# Patient Record
Sex: MALE | Race: WHITE | NOT HISPANIC OR LATINO | URBAN - METROPOLITAN AREA
[De-identification: names, ages, dates, MRNs, and addresses within clinical notes are randomized per-mention and may not be internally consistent; named-entity substitution may affect disease eponyms.]

---

## 2022-05-27 ENCOUNTER — INPATIENT (INPATIENT)
Facility: HOSPITAL | Age: 87
LOS: 5 days | Discharge: HOME | DRG: 177 | End: 2022-06-02
Attending: STUDENT IN AN ORGANIZED HEALTH CARE EDUCATION/TRAINING PROGRAM | Admitting: STUDENT IN AN ORGANIZED HEALTH CARE EDUCATION/TRAINING PROGRAM
Payer: MEDICARE

## 2022-05-27 VITALS
OXYGEN SATURATION: 96 % | HEART RATE: 76 BPM | SYSTOLIC BLOOD PRESSURE: 153 MMHG | RESPIRATION RATE: 16 BRPM | TEMPERATURE: 98 F | DIASTOLIC BLOOD PRESSURE: 88 MMHG

## 2022-05-27 LAB
ALBUMIN SERPL ELPH-MCNC: 2.7 G/DL — LOW (ref 3.3–5.2)
ALP SERPL-CCNC: 74 U/L — SIGNIFICANT CHANGE UP (ref 40–120)
ALT FLD-CCNC: 8 U/L — SIGNIFICANT CHANGE UP
ANION GAP SERPL CALC-SCNC: 12 MMOL/L — SIGNIFICANT CHANGE UP (ref 5–17)
AST SERPL-CCNC: 14 U/L — SIGNIFICANT CHANGE UP
BASOPHILS # BLD AUTO: 0.01 K/UL — SIGNIFICANT CHANGE UP (ref 0–0.2)
BASOPHILS NFR BLD AUTO: 0.1 % — SIGNIFICANT CHANGE UP (ref 0–2)
BILIRUB SERPL-MCNC: 0.6 MG/DL — SIGNIFICANT CHANGE UP (ref 0.4–2)
BUN SERPL-MCNC: 18.6 MG/DL — SIGNIFICANT CHANGE UP (ref 8–20)
CALCIUM SERPL-MCNC: 7.9 MG/DL — LOW (ref 8.6–10.2)
CHLORIDE SERPL-SCNC: 109 MMOL/L — HIGH (ref 98–107)
CK SERPL-CCNC: 56 U/L — SIGNIFICANT CHANGE UP (ref 30–200)
CO2 SERPL-SCNC: 22 MMOL/L — SIGNIFICANT CHANGE UP (ref 22–29)
CREAT SERPL-MCNC: 1 MG/DL — SIGNIFICANT CHANGE UP (ref 0.5–1.3)
EGFR: 72 ML/MIN/1.73M2 — SIGNIFICANT CHANGE UP
EOSINOPHIL # BLD AUTO: 0.01 K/UL — SIGNIFICANT CHANGE UP (ref 0–0.5)
EOSINOPHIL NFR BLD AUTO: 0.1 % — SIGNIFICANT CHANGE UP (ref 0–6)
GLUCOSE SERPL-MCNC: 91 MG/DL — SIGNIFICANT CHANGE UP (ref 70–99)
HCT VFR BLD CALC: 42.8 % — SIGNIFICANT CHANGE UP (ref 39–50)
HGB BLD-MCNC: 13.9 G/DL — SIGNIFICANT CHANGE UP (ref 13–17)
IMM GRANULOCYTES NFR BLD AUTO: 0.4 % — SIGNIFICANT CHANGE UP (ref 0–1.5)
LYMPHOCYTES # BLD AUTO: 0.86 K/UL — LOW (ref 1–3.3)
LYMPHOCYTES # BLD AUTO: 11.3 % — LOW (ref 13–44)
MCHC RBC-ENTMCNC: 31.4 PG — SIGNIFICANT CHANGE UP (ref 27–34)
MCHC RBC-ENTMCNC: 32.5 GM/DL — SIGNIFICANT CHANGE UP (ref 32–36)
MCV RBC AUTO: 96.6 FL — SIGNIFICANT CHANGE UP (ref 80–100)
MONOCYTES # BLD AUTO: 0.92 K/UL — HIGH (ref 0–0.9)
MONOCYTES NFR BLD AUTO: 12.1 % — SIGNIFICANT CHANGE UP (ref 2–14)
NEUTROPHILS # BLD AUTO: 5.79 K/UL — SIGNIFICANT CHANGE UP (ref 1.8–7.4)
NEUTROPHILS NFR BLD AUTO: 76 % — SIGNIFICANT CHANGE UP (ref 43–77)
NT-PROBNP SERPL-SCNC: 355 PG/ML — HIGH (ref 0–300)
PLATELET # BLD AUTO: 114 K/UL — LOW (ref 150–400)
POTASSIUM SERPL-MCNC: 4.2 MMOL/L — SIGNIFICANT CHANGE UP (ref 3.5–5.3)
POTASSIUM SERPL-SCNC: 4.2 MMOL/L — SIGNIFICANT CHANGE UP (ref 3.5–5.3)
PROT SERPL-MCNC: 5.6 G/DL — LOW (ref 6.6–8.7)
RBC # BLD: 4.43 M/UL — SIGNIFICANT CHANGE UP (ref 4.2–5.8)
RBC # FLD: 14.7 % — HIGH (ref 10.3–14.5)
SODIUM SERPL-SCNC: 143 MMOL/L — SIGNIFICANT CHANGE UP (ref 135–145)
TROPONIN T SERPL-MCNC: <0.01 NG/ML — SIGNIFICANT CHANGE UP (ref 0–0.06)
WBC # BLD: 7.62 K/UL — SIGNIFICANT CHANGE UP (ref 3.8–10.5)
WBC # FLD AUTO: 7.62 K/UL — SIGNIFICANT CHANGE UP (ref 3.8–10.5)

## 2022-05-27 PROCEDURE — 99285 EMERGENCY DEPT VISIT HI MDM: CPT | Mod: CS,GC

## 2022-05-27 PROCEDURE — 71045 X-RAY EXAM CHEST 1 VIEW: CPT | Mod: 26

## 2022-05-27 RX ORDER — SODIUM CHLORIDE 9 MG/ML
1000 INJECTION INTRAMUSCULAR; INTRAVENOUS; SUBCUTANEOUS ONCE
Refills: 0 | Status: COMPLETED | OUTPATIENT
Start: 2022-05-27 | End: 2022-05-27

## 2022-05-27 RX ADMIN — SODIUM CHLORIDE 1000 MILLILITER(S): 9 INJECTION INTRAMUSCULAR; INTRAVENOUS; SUBCUTANEOUS at 20:46

## 2022-05-27 NOTE — ED PROVIDER NOTE - ATTENDING CONTRIBUTION TO CARE
89 yo M transferred from assisted living residence for eval of weakness, diarrhea, AMS and decreased po intake.  Pt was noted to be COVID+ today.  Pt also noted to have new RBBB on EKG.  No reported fever.  On exam awake and alert, oriented x 2, PERRL, + dry mm, Neck supple, Cor Reg, Lungs poor insp effort, otherwise clear b/l, Abd soft, NT, Ext FROM, Neuro no focal deficits.  will check labs, EKG, CXR and re-eval after IVF

## 2022-05-27 NOTE — ED PROVIDER NOTE - CARE PLAN
Principal Discharge DX:	Toxic metabolic encephalopathy  Secondary Diagnosis:	Urinary tract infection   1

## 2022-05-27 NOTE — ED ADULT NURSE NOTE - OBJECTIVE STATEMENT
pt A&Ox1 states he does not know where he is right now or the reason for being in the hospital.. pt poor historian due to dementia hx. pt update on plan of care and place on cm currently BBB and spo2 WNL on RA.

## 2022-05-27 NOTE — ED PROVIDER NOTE - CLINICAL SUMMARY MEDICAL DECISION MAKING FREE TEXT BOX
Patient is 89 yo male with PMHx HTN, HLD, MDD, Insomnia, dementia, prostate CA presenting from the [facility] [place] presenting with lethargy, diarrhea, and recent covid diagnosis. As per nurse at [facility] ([phone]) patient has had decreased PO intolerance, nonbloody diarrhea, fatigue, lethargy, and aggressive behavior for 2 days which is highly unusual for the patient. Patient was also found to have a new RBBB on ecg this AM and found to be COVID-19 positive; patient is vaccinated. Arrives VSS, aaox2, mildly confused, at baseline complaining of weakness, no current CP or SOB, lungs CTAB, belly soft nontender, dry oropharynx, no peripheral edema, no FND, moving all extremities equally, no ataxia. cbc cmp cpk to r/o electrolyte abnormalities; ecg troponin to r/o ACS. RVP UA BNP CXR to r/o UTI vs. URI vs. HF vs. PNA. Will hydrate and continue to monitor.

## 2022-05-27 NOTE — ED ADULT TRIAGE NOTE - CHIEF COMPLAINT QUOTE
patient from [facility], with complaints of diarrhea, patient was sent for being dehydrated. patient has a history of dimentia unknown baseline, patient denies any complaints of pain or discomfort,  patient COVID pos

## 2022-05-27 NOTE — ED ADULT NURSE NOTE - CHIEF COMPLAINT QUOTE
patient from [facility], with complaints of diarrhea, patient was sent for being dehydrated. patient has a history of dimentia unknown baseline, patient denies any complaints of pain or discomfort,  patient COVID pos

## 2022-05-27 NOTE — ED PROVIDER NOTE - OBJECTIVE STATEMENT
Patient is 87 yo male with PMHx HTN, HLD, MDD, Insomnia, dementia, prostate CA presenting from the [facility] at [place] presenting with lethargy, diarrhea, and recent covid diagnosis. As per nurse at [facility] ([phone]) patient has had decreased PO intolerance, nonbloody diarrhea, fatigue, lethargy, and aggressive behavior for 2 days which is highly unusual for the patient. Patient was also found to have a new RBBB on ecg this AM and found to be COVID-19 positive; patient is vaccinated. Arrives VSS, aaox2, mildly confused, at baseline complaining of weakness, no current CP or SOB. Patient does not have any cardiac history, FH of cardiac disease, prior smoking. Denies fevers, chills, dizziness, lightheadedness, dysphagia, dysarthria, diplopia, photophobia, syncope, cough, congestion, SOB, CP, abdominal pain, neck pain, back pain, dysuria, hematuria, hematochezia, hematemesis, n/v, recent travel, sick contacts, leg swelling.

## 2022-05-27 NOTE — ED PROVIDER NOTE - PROGRESS NOTE DETAILS
JK - patient cbc cmp ecg CXR troponin WNL. Found to be covid-19 positive with UA c/f UTI; IV abx given. Patient aaox2, still mildly confused, VSS, resting comfortably, in no apparent distress. Ready and agreeable to admission for IV abx and further monitoring.

## 2022-05-27 NOTE — ED PROVIDER NOTE - PHYSICAL EXAMINATION
Constitutional: Uncomfortable appearing, awake, alert, oriented to person, place, not time/place at baseline mental status, mildly confused, and in no apparent distress  ENMT: Airway patent nasal mucosa clear. Mouth with dry  mucosa. Throat has no vesicles no oropharyngeal exudates and uvula is midline. No blood in the oropharynx  EYES: clear bilaterally, pupils equal, round and reactive to light  Cardiac: Regular rate, regular rhythm. Heart sounds S1, S2. No murmurs, rubs or gallops. Good capillary refill, 2+ pulses, no peripheral edema  Respiratory: Lungs CTAB, no use of accessory muscles, no crackles, satting 96% on RA in no distress  Gastrointestinal: Abdomen nondistended, non-tender, no rebound guarding or peritoneal signs  Genitourinary: No CVA tenderness, pelvis nontender, bladder nondistended  Musculoskeletal: Spine appears normal, range of motion is not limited, no muscle or joint tenderness  Neurological: Alert and oriented, no focal deficits, no motor or sensory deficits. CN 2-12 intact, PERRLA, EOMI, No FND, moving all extremities equally, sensation intact, No dysmetria, no ataxia, negative heel-shin, 5/5 strength   Skin: Skin normal color for race, warm, dry and intact, No evidence of rash

## 2022-05-28 DIAGNOSIS — G92.8 OTHER TOXIC ENCEPHALOPATHY: ICD-10-CM

## 2022-05-28 LAB
APPEARANCE UR: ABNORMAL
BACTERIA # UR AUTO: ABNORMAL
BILIRUB UR-MCNC: NEGATIVE — SIGNIFICANT CHANGE UP
COLOR SPEC: YELLOW — SIGNIFICANT CHANGE UP
DIFF PNL FLD: ABNORMAL
EPI CELLS # UR: SIGNIFICANT CHANGE UP
GLUCOSE UR QL: NEGATIVE — SIGNIFICANT CHANGE UP
KETONES UR-MCNC: ABNORMAL
LEUKOCYTE ESTERASE UR-ACNC: ABNORMAL
NITRITE UR-MCNC: POSITIVE
PH UR: 7 — SIGNIFICANT CHANGE UP (ref 5–8)
PROT UR-MCNC: NEGATIVE — SIGNIFICANT CHANGE UP
RAPID RVP RESULT: DETECTED
RBC CASTS # UR COMP ASSIST: ABNORMAL /HPF (ref 0–4)
SARS-COV-2 RNA SPEC QL NAA+PROBE: DETECTED
SP GR SPEC: 1.01 — SIGNIFICANT CHANGE UP (ref 1.01–1.02)
UROBILINOGEN FLD QL: NEGATIVE — SIGNIFICANT CHANGE UP
WBC UR QL: ABNORMAL /HPF (ref 0–5)

## 2022-05-28 PROCEDURE — 99223 1ST HOSP IP/OBS HIGH 75: CPT

## 2022-05-28 PROCEDURE — 12345: CPT | Mod: NC

## 2022-05-28 PROCEDURE — 93010 ELECTROCARDIOGRAM REPORT: CPT

## 2022-05-28 RX ORDER — CEFTRIAXONE 500 MG/1
1000 INJECTION, POWDER, FOR SOLUTION INTRAMUSCULAR; INTRAVENOUS ONCE
Refills: 0 | Status: COMPLETED | OUTPATIENT
Start: 2022-05-28 | End: 2022-05-28

## 2022-05-28 RX ORDER — TRAZODONE HCL 50 MG
1 TABLET ORAL
Qty: 0 | Refills: 0 | DISCHARGE

## 2022-05-28 RX ORDER — VENLAFAXINE HCL 75 MG
75 CAPSULE, EXT RELEASE 24 HR ORAL DAILY
Refills: 0 | Status: DISCONTINUED | OUTPATIENT
Start: 2022-05-28 | End: 2022-06-02

## 2022-05-28 RX ORDER — DILTIAZEM HCL 120 MG
60 CAPSULE, EXT RELEASE 24 HR ORAL
Refills: 0 | Status: DISCONTINUED | OUTPATIENT
Start: 2022-05-28 | End: 2022-06-02

## 2022-05-28 RX ORDER — METOPROLOL TARTRATE 50 MG
1 TABLET ORAL
Qty: 0 | Refills: 0 | DISCHARGE

## 2022-05-28 RX ORDER — METOPROLOL TARTRATE 50 MG
50 TABLET ORAL DAILY
Refills: 0 | Status: DISCONTINUED | OUTPATIENT
Start: 2022-05-28 | End: 2022-06-02

## 2022-05-28 RX ORDER — ONDANSETRON 8 MG/1
4 TABLET, FILM COATED ORAL EVERY 8 HOURS
Refills: 0 | Status: DISCONTINUED | OUTPATIENT
Start: 2022-05-28 | End: 2022-06-02

## 2022-05-28 RX ORDER — HEPARIN SODIUM 5000 [USP'U]/ML
5000 INJECTION INTRAVENOUS; SUBCUTANEOUS EVERY 12 HOURS
Refills: 0 | Status: DISCONTINUED | OUTPATIENT
Start: 2022-05-28 | End: 2022-05-31

## 2022-05-28 RX ORDER — DONEPEZIL HYDROCHLORIDE 10 MG/1
10 TABLET, FILM COATED ORAL AT BEDTIME
Refills: 0 | Status: DISCONTINUED | OUTPATIENT
Start: 2022-05-28 | End: 2022-06-02

## 2022-05-28 RX ORDER — LANOLIN ALCOHOL/MO/W.PET/CERES
3 CREAM (GRAM) TOPICAL AT BEDTIME
Refills: 0 | Status: DISCONTINUED | OUTPATIENT
Start: 2022-05-28 | End: 2022-06-02

## 2022-05-28 RX ORDER — MIRTAZAPINE 45 MG/1
7.5 TABLET, ORALLY DISINTEGRATING ORAL AT BEDTIME
Refills: 0 | Status: DISCONTINUED | OUTPATIENT
Start: 2022-05-28 | End: 2022-06-02

## 2022-05-28 RX ORDER — SODIUM CHLORIDE 9 MG/ML
3 INJECTION INTRAMUSCULAR; INTRAVENOUS; SUBCUTANEOUS EVERY 8 HOURS
Refills: 0 | Status: DISCONTINUED | OUTPATIENT
Start: 2022-05-28 | End: 2022-06-02

## 2022-05-28 RX ORDER — CEFTRIAXONE 500 MG/1
2000 INJECTION, POWDER, FOR SOLUTION INTRAMUSCULAR; INTRAVENOUS EVERY 24 HOURS
Refills: 0 | Status: DISCONTINUED | OUTPATIENT
Start: 2022-05-28 | End: 2022-06-02

## 2022-05-28 RX ORDER — ATORVASTATIN CALCIUM 80 MG/1
20 TABLET, FILM COATED ORAL AT BEDTIME
Refills: 0 | Status: DISCONTINUED | OUTPATIENT
Start: 2022-05-28 | End: 2022-06-02

## 2022-05-28 RX ORDER — TRAZODONE HCL 50 MG
100 TABLET ORAL AT BEDTIME
Refills: 0 | Status: DISCONTINUED | OUTPATIENT
Start: 2022-05-28 | End: 2022-06-02

## 2022-05-28 RX ORDER — ACETAMINOPHEN 500 MG
650 TABLET ORAL EVERY 6 HOURS
Refills: 0 | Status: DISCONTINUED | OUTPATIENT
Start: 2022-05-28 | End: 2022-06-02

## 2022-05-28 RX ADMIN — SODIUM CHLORIDE 3 MILLILITER(S): 9 INJECTION INTRAMUSCULAR; INTRAVENOUS; SUBCUTANEOUS at 22:48

## 2022-05-28 RX ADMIN — SODIUM CHLORIDE 3 MILLILITER(S): 9 INJECTION INTRAMUSCULAR; INTRAVENOUS; SUBCUTANEOUS at 14:47

## 2022-05-28 RX ADMIN — DONEPEZIL HYDROCHLORIDE 10 MILLIGRAM(S): 10 TABLET, FILM COATED ORAL at 22:47

## 2022-05-28 RX ADMIN — HEPARIN SODIUM 5000 UNIT(S): 5000 INJECTION INTRAVENOUS; SUBCUTANEOUS at 06:37

## 2022-05-28 RX ADMIN — ATORVASTATIN CALCIUM 20 MILLIGRAM(S): 80 TABLET, FILM COATED ORAL at 22:47

## 2022-05-28 RX ADMIN — Medication 100 MILLIGRAM(S): at 22:46

## 2022-05-28 RX ADMIN — CEFTRIAXONE 100 MILLIGRAM(S): 500 INJECTION, POWDER, FOR SOLUTION INTRAMUSCULAR; INTRAVENOUS at 02:37

## 2022-05-28 RX ADMIN — Medication 75 MILLIGRAM(S): at 14:31

## 2022-05-28 RX ADMIN — Medication 5 MILLIGRAM(S): at 14:31

## 2022-05-28 RX ADMIN — SODIUM CHLORIDE 3 MILLILITER(S): 9 INJECTION INTRAMUSCULAR; INTRAVENOUS; SUBCUTANEOUS at 06:24

## 2022-05-28 RX ADMIN — Medication 60 MILLIGRAM(S): at 18:32

## 2022-05-28 RX ADMIN — MIRTAZAPINE 7.5 MILLIGRAM(S): 45 TABLET, ORALLY DISINTEGRATING ORAL at 22:47

## 2022-05-28 RX ADMIN — Medication 50 MILLIGRAM(S): at 14:31

## 2022-05-28 RX ADMIN — HEPARIN SODIUM 5000 UNIT(S): 5000 INJECTION INTRAVENOUS; SUBCUTANEOUS at 18:32

## 2022-05-28 NOTE — H&P ADULT - NSHPSOCIALHISTORY_GEN_ALL_CORE
Unable to obtain surgical history, or family history at this time due to dementia, no family available

## 2022-05-28 NOTE — PATIENT PROFILE ADULT - FALL HARM RISK - HARM RISK INTERVENTIONS
Reason for Disposition   Nursing judgment or information in reference    Protocols used: NO GUIDELINE AVAILABLE-ADULT-AH    [facility], with red flag complaint, transferred to RN access for triage. Call not triaged. Pt not with daughter. Pt lives in an assisted living facility (non skilled nursing). His aid called the daughter and stated that for the last 2-3 days, pt has been leaning when ambulating. Fast stroke test revealed no lateral weakness. Pt has no c/o pain. Aid also states she noticed some drooling from pt. Daughter states that pt's code status is [facility] so she did not want him transported to the ED. He has a regular appointment scheduled in April, but the daughter would like for him to be seen earlier. Writer recommended for him to be seen at the first available appointment. Patient's daughter verbalized understanding. She denies any other questions or concerns. Writer provided warm transfer to Yeimi Topete for appointment scheduling. Please do not respond to the triage nurse through this encounter. Any subsequent communication should be directly with the patient.
Assistance with ambulation/Assistance OOB with selected safe patient handling equipment/Communicate Risk of Fall with Harm to all staff/Discuss with provider need for PT consult/Monitor gait and stability/Provide patient with walking aids - walker, cane, crutches/Reinforce activity limits and safety measures with patient and family/Tailored Fall Risk Interventions/Visual Cue: Yellow wristband and red socks/Bed in lowest position, wheels locked, appropriate side rails in place/Call bell, personal items and telephone in reach/Instruct patient to call for assistance before getting out of bed or chair/Non-slip footwear when patient is out of bed/[place] to call system/Physically safe environment - no spills, clutter or unnecessary equipment/Purposeful Proactive Rounding/Room/bathroom lighting operational, light cord in reach

## 2022-05-28 NOTE — H&P ADULT - ASSESSMENT
89 y/o male with UTI, COVID, Metabolic encephalopathy, Hx of MDD, Dementia, HLD, HTN, prostate cancer     UTI:  -Cont. Rocephin pending cultures  -Check PVR to r/o urinary retention/stasis as cause of UTI  -No flank pain, fever, leukocytosis, low suspicion for septic stone/pyelonephritis   -Trend WBC/Temp curve  -OOB, ambulate, PT program  -Heparin Sub Q for DVT-P    COVID:  -No hypoxia  -No fever  -No CXR w/o infiltrates  -Isolation  -mAb as o/p?  -Supportive care    Dementia:  -Fall risk  -Await med list from [facility]    HLD:  -Cardiac diet  -await med list from [facility]     HTN:  -DASH diet  -Await med list from [facility]    Prostate cancer:  -Await records from [facility]    Discussed with ED staff

## 2022-05-28 NOTE — H&P ADULT - RESPIRATORY
Faustino from [facility] [phone] called. Patient appointment has been rescheduled to 10/30/20  
This PSR called [facility] in [place] to r/s appt. Left detailed message for BEVERLEY Stanton to call back to r/s appt to 10/30.  
detailed exam

## 2022-05-28 NOTE — H&P ADULT - CRANIAL NERVE
[FreeTextEntry1] : Patient doing well.  Discussed option of outpatient PT for balance for community ambulation.  Pt. declines at this time but will gradually increase community function and will call for Rx if develops challenges and wants to pursue outpatient therapy.  \par \par No neurological or cognitive impairment that I see that would affect driving.  Pt. referred for behind the wheel driving evaluation.  \par \par f/u PRN
no asymmetry

## 2022-05-28 NOTE — ED ADULT NURSE REASSESSMENT NOTE - NS ED NURSE REASSESS COMMENT FT1
Pt found sleeping.  Easily arousable.  Denies pain.  NS on cardiac monitor.
all other ROS negative except as per HPI

## 2022-05-28 NOTE — H&P ADULT - NSICDXPASTMEDICALHX_GEN_ALL_CORE_FT
PAST MEDICAL HISTORY:  Dementia     HLD (hyperlipidemia)     HTN (hypertension)     Prostate cancer

## 2022-05-28 NOTE — PROGRESS NOTE ADULT - ASSESSMENT
87 y/o male with Hx of MDD, Dementia, HLD, HTN, prostate cancer presents with reported Dementia sent from [place] for increased confusion, diarrhea, and positive covid test. Admitted with UTI, COVID, Metabolic encephalopathy.     UTI  c/w Rocephin   Await UCx   Bladder scan post void   No flank pain, fever, leukocytosis  OOB, ambulate     COVID 19   No symptoms   CXR w/o infiltrates  c/w Isolation  c/w Supportive care    Dementia  Fall risk  restart Donepezil, Mirtazapine, Trazodone, Venlafaxine     HLD  HTN  c/w Metoprolol and Cardizem   DASH diet    Prostate cancer  Overactive bladder   takes Gemtesa at home     DVT ppx: Heparin SQ

## 2022-05-28 NOTE — H&P ADULT - HISTORY OF PRESENT ILLNESS
87 y/o male with reported Dementia sent from [place] for increased confusion  89 y/o male with reported Dementia sent from [place] for increased confusion, diarrhea, and positive covid test. Patient unable to provide any meaningful history, no paperwork or phone numbers available to speak to family for collateral information. Per chart review patient has a history of dementia HLD, prostate cancer, and HTN. ED w/u with stable vitals, non-focal exam. Found to be COVID pos, no hypoxia, or infiltrates on CXR. U/A grossly positive. No CVA tenderness noted. Patient cultured, given IV abx and IVF. Spoke with Olivia Chowdary on call at [place] who didnt have meds or other medical info but is going to have it called over/fax information to [facility] today. Patient at this time offers no c/o pain, CP, SOB, HA, fever or chills.

## 2022-05-29 LAB
ANION GAP SERPL CALC-SCNC: 16 MMOL/L — SIGNIFICANT CHANGE UP (ref 5–17)
BASOPHILS # BLD AUTO: 0.01 K/UL — SIGNIFICANT CHANGE UP (ref 0–0.2)
BASOPHILS NFR BLD AUTO: 0.2 % — SIGNIFICANT CHANGE UP (ref 0–2)
BUN SERPL-MCNC: 18.8 MG/DL — SIGNIFICANT CHANGE UP (ref 8–20)
CALCIUM SERPL-MCNC: 8 MG/DL — LOW (ref 8.6–10.2)
CHLORIDE SERPL-SCNC: 106 MMOL/L — SIGNIFICANT CHANGE UP (ref 98–107)
CO2 SERPL-SCNC: 21 MMOL/L — LOW (ref 22–29)
CREAT SERPL-MCNC: 0.98 MG/DL — SIGNIFICANT CHANGE UP (ref 0.5–1.3)
EGFR: 74 ML/MIN/1.73M2 — SIGNIFICANT CHANGE UP
EOSINOPHIL # BLD AUTO: 0.07 K/UL — SIGNIFICANT CHANGE UP (ref 0–0.5)
EOSINOPHIL NFR BLD AUTO: 1.2 % — SIGNIFICANT CHANGE UP (ref 0–6)
GLUCOSE SERPL-MCNC: 82 MG/DL — SIGNIFICANT CHANGE UP (ref 70–99)
HCT VFR BLD CALC: 45 % — SIGNIFICANT CHANGE UP (ref 39–50)
HGB BLD-MCNC: 14.6 G/DL — SIGNIFICANT CHANGE UP (ref 13–17)
IMM GRANULOCYTES NFR BLD AUTO: 0.5 % — SIGNIFICANT CHANGE UP (ref 0–1.5)
LYMPHOCYTES # BLD AUTO: 1.01 K/UL — SIGNIFICANT CHANGE UP (ref 1–3.3)
LYMPHOCYTES # BLD AUTO: 17.7 % — SIGNIFICANT CHANGE UP (ref 13–44)
MCHC RBC-ENTMCNC: 31.4 PG — SIGNIFICANT CHANGE UP (ref 27–34)
MCHC RBC-ENTMCNC: 32.4 GM/DL — SIGNIFICANT CHANGE UP (ref 32–36)
MCV RBC AUTO: 96.8 FL — SIGNIFICANT CHANGE UP (ref 80–100)
MONOCYTES # BLD AUTO: 0.61 K/UL — SIGNIFICANT CHANGE UP (ref 0–0.9)
MONOCYTES NFR BLD AUTO: 10.7 % — SIGNIFICANT CHANGE UP (ref 2–14)
NEUTROPHILS # BLD AUTO: 3.97 K/UL — SIGNIFICANT CHANGE UP (ref 1.8–7.4)
NEUTROPHILS NFR BLD AUTO: 69.7 % — SIGNIFICANT CHANGE UP (ref 43–77)
PLATELET # BLD AUTO: 150 K/UL — SIGNIFICANT CHANGE UP (ref 150–400)
POTASSIUM SERPL-MCNC: 3.9 MMOL/L — SIGNIFICANT CHANGE UP (ref 3.5–5.3)
POTASSIUM SERPL-SCNC: 3.9 MMOL/L — SIGNIFICANT CHANGE UP (ref 3.5–5.3)
RBC # BLD: 4.65 M/UL — SIGNIFICANT CHANGE UP (ref 4.2–5.8)
RBC # FLD: 14.9 % — HIGH (ref 10.3–14.5)
SODIUM SERPL-SCNC: 143 MMOL/L — SIGNIFICANT CHANGE UP (ref 135–145)
WBC # BLD: 5.7 K/UL — SIGNIFICANT CHANGE UP (ref 3.8–10.5)
WBC # FLD AUTO: 5.7 K/UL — SIGNIFICANT CHANGE UP (ref 3.8–10.5)

## 2022-05-29 PROCEDURE — 99233 SBSQ HOSP IP/OBS HIGH 50: CPT

## 2022-05-29 RX ADMIN — Medication 50 MILLIGRAM(S): at 05:57

## 2022-05-29 RX ADMIN — HEPARIN SODIUM 5000 UNIT(S): 5000 INJECTION INTRAVENOUS; SUBCUTANEOUS at 05:56

## 2022-05-29 RX ADMIN — CEFTRIAXONE 100 MILLIGRAM(S): 500 INJECTION, POWDER, FOR SOLUTION INTRAMUSCULAR; INTRAVENOUS at 03:10

## 2022-05-29 RX ADMIN — Medication 60 MILLIGRAM(S): at 12:00

## 2022-05-29 RX ADMIN — SODIUM CHLORIDE 3 MILLILITER(S): 9 INJECTION INTRAMUSCULAR; INTRAVENOUS; SUBCUTANEOUS at 05:57

## 2022-05-29 RX ADMIN — SODIUM CHLORIDE 3 MILLILITER(S): 9 INJECTION INTRAMUSCULAR; INTRAVENOUS; SUBCUTANEOUS at 14:03

## 2022-05-29 RX ADMIN — SODIUM CHLORIDE 3 MILLILITER(S): 9 INJECTION INTRAMUSCULAR; INTRAVENOUS; SUBCUTANEOUS at 22:49

## 2022-05-29 RX ADMIN — DONEPEZIL HYDROCHLORIDE 10 MILLIGRAM(S): 10 TABLET, FILM COATED ORAL at 23:02

## 2022-05-29 RX ADMIN — Medication 100 MILLIGRAM(S): at 22:56

## 2022-05-29 RX ADMIN — HEPARIN SODIUM 5000 UNIT(S): 5000 INJECTION INTRAVENOUS; SUBCUTANEOUS at 18:07

## 2022-05-29 RX ADMIN — MIRTAZAPINE 7.5 MILLIGRAM(S): 45 TABLET, ORALLY DISINTEGRATING ORAL at 22:56

## 2022-05-29 RX ADMIN — Medication 60 MILLIGRAM(S): at 23:02

## 2022-05-29 RX ADMIN — Medication 60 MILLIGRAM(S): at 01:11

## 2022-05-29 RX ADMIN — Medication 5 MILLIGRAM(S): at 05:56

## 2022-05-29 RX ADMIN — Medication 60 MILLIGRAM(S): at 05:56

## 2022-05-29 RX ADMIN — ATORVASTATIN CALCIUM 20 MILLIGRAM(S): 80 TABLET, FILM COATED ORAL at 22:57

## 2022-05-29 RX ADMIN — Medication 60 MILLIGRAM(S): at 18:06

## 2022-05-29 NOTE — PROGRESS NOTE ADULT - ASSESSMENT
89 y/o male with Hx of MDD, Dementia, HLD, HTN, prostate cancer presents with reported Dementia sent from [place] for increased confusion, diarrhea, and positive covid test. Admitted with UTI, COVID, Metabolic encephalopathy.     #UTI  -c/w Rocephin   -Await UCx   -Bladder scan post void   -No flank pain, fever, leukocytosis  -OOB, ambulate     #COVID 19  - s/p vaccination and booster, diagnosed 5/27  -on RA  -CXR w/o infiltrates  -c/w Isolation  -c/w Supportive care  -consult ID for possible candidate for monoclonal abx    #Dementia  -Fall risk  -restart Donepezil, Mirtazapine, Trazodone, Venlafaxine   -obtain bedside swallow, will resume diet afterwards  -aspiration precaution     #HLD  #HTN  -c/w Metoprolol and Cardizem   -DASH diet    #Prostate cancer  #Overactive bladder   -takes Gemtesa at home     DVT ppx: Heparin SQ   Diet: Regular after bedside swallow eval  Dispo: back to [facility] in 1-2 days    PT eval pending     Updated son Good [phone] on the phone 5/29 87 y/o male with Hx of MDD, Dementia, HLD, HTN, prostate cancer presents with reported Dementia sent from [place] for increased confusion, diarrhea, and positive covid test. Admitted with UTI, COVID, Metabolic encephalopathy.     #UTI  #Metabolic encephalopathy   -c/w Rocephin   -Await UCx   -Bladder scan post void   -No flank pain, fever, leukocytosis  -OOB, ambulate     #COVID 19  - s/p vaccination and booster, diagnosed 5/27  -CXR w/o infiltrates  -c/w Isolation  -c/w Supportive care  -consult ID for possible candidate for monoclonal abx    #Dementia  -Fall risk  -restart Donepezil, Mirtazapine, Trazodone, Venlafaxine   -obtain bedside swallow, will resume diet afterwards  -aspiration precaution     #HLD  #HTN  -c/w Metoprolol and Cardizem   -DASH diet    #Prostate cancer  #Overactive bladder   -takes Gemtesa at home     DVT ppx: Heparin SQ   Diet: Regular after bedside swallow eval  Dispo: back to [place] in 1-2 days    PT eval pending     Updated son Good [phone] on the phone 5/29 87 y/o male with Hx of MDD, Dementia, HLD, HTN, prostate cancer presents with reported Dementia sent from [place] for increased confusion, diarrhea, and positive covid test. Admitted with UTI, COVID, Metabolic encephalopathy.     #UTI  #Metabolic encephalopathy   -c/w Rocephin   -Await UCx   -Bladder scan post void   -No flank pain, fever, leukocytosis  -OOB, ambulate     #COVID 19  - s/p vaccination and booster, diagnosed 5/27  -CXR w/o infiltrates  -c/w Isolation  -c/w Supportive care  -ID consulted for possible candidate for monoclonal abx    #Dementia  -Fall risk  -restart Donepezil, Mirtazapine, Trazodone, Venlafaxine   -obtain bedside swallow, will resume diet afterwards  -aspiration precaution     #HLD  #HTN  -c/w Metoprolol and Cardizem   -DASH diet    #Prostate cancer  #Overactive bladder   -takes Gemtesa at home     DVT ppx: Heparin SQ   Diet: Regular after bedside swallow eval  Dispo: back to [place] in 1-2 days    PT eval pending     Updated son Good [phone] on the phone 5/29

## 2022-05-30 PROCEDURE — 76775 US EXAM ABDO BACK WALL LIM: CPT | Mod: 26

## 2022-05-30 PROCEDURE — 99233 SBSQ HOSP IP/OBS HIGH 50: CPT

## 2022-05-30 PROCEDURE — 99223 1ST HOSP IP/OBS HIGH 75: CPT

## 2022-05-30 RX ORDER — REMDESIVIR 5 MG/ML
100 INJECTION INTRAVENOUS EVERY 24 HOURS
Refills: 0 | Status: DISCONTINUED | OUTPATIENT
Start: 2022-05-31 | End: 2022-06-02

## 2022-05-30 RX ORDER — REMDESIVIR 5 MG/ML
INJECTION INTRAVENOUS
Refills: 0 | Status: DISCONTINUED | OUTPATIENT
Start: 2022-05-30 | End: 2022-06-02

## 2022-05-30 RX ORDER — DEXAMETHASONE 0.5 MG/5ML
6 ELIXIR ORAL DAILY
Refills: 0 | Status: DISCONTINUED | OUTPATIENT
Start: 2022-05-30 | End: 2022-06-02

## 2022-05-30 RX ORDER — REMDESIVIR 5 MG/ML
200 INJECTION INTRAVENOUS EVERY 24 HOURS
Refills: 0 | Status: COMPLETED | OUTPATIENT
Start: 2022-05-30 | End: 2022-05-30

## 2022-05-30 RX ADMIN — DONEPEZIL HYDROCHLORIDE 10 MILLIGRAM(S): 10 TABLET, FILM COATED ORAL at 21:20

## 2022-05-30 RX ADMIN — HEPARIN SODIUM 5000 UNIT(S): 5000 INJECTION INTRAVENOUS; SUBCUTANEOUS at 18:23

## 2022-05-30 RX ADMIN — REMDESIVIR 200 MILLIGRAM(S): 5 INJECTION INTRAVENOUS at 14:18

## 2022-05-30 RX ADMIN — Medication 60 MILLIGRAM(S): at 11:47

## 2022-05-30 RX ADMIN — Medication 50 MILLIGRAM(S): at 06:24

## 2022-05-30 RX ADMIN — Medication 60 MILLIGRAM(S): at 06:23

## 2022-05-30 RX ADMIN — CEFTRIAXONE 100 MILLIGRAM(S): 500 INJECTION, POWDER, FOR SOLUTION INTRAMUSCULAR; INTRAVENOUS at 02:23

## 2022-05-30 RX ADMIN — Medication 5 MILLIGRAM(S): at 06:24

## 2022-05-30 RX ADMIN — HEPARIN SODIUM 5000 UNIT(S): 5000 INJECTION INTRAVENOUS; SUBCUTANEOUS at 06:24

## 2022-05-30 RX ADMIN — SODIUM CHLORIDE 3 MILLILITER(S): 9 INJECTION INTRAMUSCULAR; INTRAVENOUS; SUBCUTANEOUS at 06:19

## 2022-05-30 RX ADMIN — SODIUM CHLORIDE 3 MILLILITER(S): 9 INJECTION INTRAMUSCULAR; INTRAVENOUS; SUBCUTANEOUS at 22:01

## 2022-05-30 RX ADMIN — Medication 6 MILLIGRAM(S): at 13:48

## 2022-05-30 RX ADMIN — ATORVASTATIN CALCIUM 20 MILLIGRAM(S): 80 TABLET, FILM COATED ORAL at 21:20

## 2022-05-30 RX ADMIN — Medication 75 MILLIGRAM(S): at 11:46

## 2022-05-30 RX ADMIN — Medication 60 MILLIGRAM(S): at 18:23

## 2022-05-30 RX ADMIN — MIRTAZAPINE 7.5 MILLIGRAM(S): 45 TABLET, ORALLY DISINTEGRATING ORAL at 21:20

## 2022-05-30 RX ADMIN — Medication 100 MILLIGRAM(S): at 21:20

## 2022-05-30 RX ADMIN — SODIUM CHLORIDE 3 MILLILITER(S): 9 INJECTION INTRAMUSCULAR; INTRAVENOUS; SUBCUTANEOUS at 13:04

## 2022-05-30 NOTE — PROGRESS NOTE ADULT - ASSESSMENT
89 y/o male with Hx of MDD, Dementia, HLD, HTN, prostate cancer presents with reported Dementia sent from [place] for increased confusion, diarrhea, and positive covid test. Admitted with UTI, COVID, Metabolic encephalopathy.     #UTI  #Metabolic encephalopathy   -c/w Rocephin   -UCx w/ proteus, f/u sensitivity   -Bladder scan post void   -No flank pain, fever, leukocytosis  -OOB, ambulate     #COVID 19  - s/p vaccination and booster, diagnosed 5/27  -CXR w/o infiltrates  -c/w Isolation  -c/w Supportive care  -ID consulted for consent for monoclonal abx     #Dementia  -Fall risk  -restart Donepezil, Mirtazapine, Trazodone, Venlafaxine   -obtain bedside swallow, will resume diet afterwards  -aspiration precaution     #HLD  #HTN  -c/w Metoprolol and Cardizem   -DASH diet    #Prostate cancer  #Overactive bladder   -takes Gemtesa at home     DVT ppx: Heparin SQ   Diet: Regular  Dispo: back to [place] in 1-2 days, pending UCx sensitivity    PT eval pending  87 y/o male with Hx of MDD, Dementia, HLD, HTN, prostate cancer presents with reported Dementia sent from [place] for increased confusion, diarrhea, and positive covid test. Admitted with UTI, COVID, Metabolic encephalopathy.     #UTI  #Metabolic encephalopathy   -c/w Rocephin   -UCx w/ proteus, f/u sensitivity   -Bladder scan post void   -No flank pain, fever, leukocytosis  -OOB, ambulate     #COVID 19  - s/p vaccination and booster, diagnosed 5/27  #Acute hypoxia reaspiratory failure - worsening O2, 88% on RA  -CXR w/o infiltrates  -c/w Isolation  -c/w Supportive care  -ID consulted, harsh recs  -d/w ID, will start Remdesivir and Decadron    -chest PT  #Dementia  -Fall risk  -restart Donepezil, Mirtazapine, Trazodone, Venlafaxine   -obtain bedside swallow, will resume diet afterwards  -aspiration precaution     #HLD  #HTN  -c/w Metoprolol and Cardizem   -DASH diet    #Prostate cancer  #Overactive bladder   -takes Gemtesa at home     DVT ppx: Heparin SQ   Diet: Regular  Dispo: back to [place] in 1-2 days, pending UCx sensitivity    PT eval pending  87 y/o male with Hx of MDD, Dementia, HLD, HTN, prostate cancer presents with reported Dementia sent from [place] for increased confusion, diarrhea, and positive covid test. Admitted with UTI, COVID, Metabolic encephalopathy.     #UTI  #Metabolic encephalopathy   -c/w Rocephin   -UCx w/ proteus, f/u sensitivity   -Bladder scan post void   -No flank pain, fever, leukocytosis  -OOB, ambulate     #COVID 19  - s/p vaccination and booster, diagnosed 5/27  #Acute hypoxia reaspiratory failure - worsening O2, 88% on RA  -CXR w/o infiltrates  -c/w Isolation  -c/w Supportive care  -ID consulted, harsh recs  -d/w ID, will start Remdesivir and Decadron    -chest PT    #Dementia  -Fall risk  -restart Donepezil, Mirtazapine, Trazodone, Venlafaxine   -obtain bedside swallow, will resume diet afterwards  -aspiration precaution     #HLD  #HTN  -c/w Metoprolol and Cardizem   -DASH diet    #Prostate cancer  #Overactive bladder   -takes Gemtesa at home     DVT ppx: Heparin SQ   Diet: Regular  Dispo: back to [place] in 1-2 days, pending UCx sensitivity    PT eval pending

## 2022-05-30 NOTE — CONSULT NOTE ADULT - SUBJECTIVE AND OBJECTIVE BOX
[facility]  INFECTIOUS DISEASES AND INTERNAL MEDICINE at [place]  =======================================================  Jack Hooper MD  Diplomates American Board of Internal Medicine and Infectious Diseases  Tel: [phone]      Fax: [phone]  =======================================================      [id number]  DOMONIQUE MAST    CC: Patient is a 88y old  Male who presents with a chief complaint of UTI  COVID  Metabolic Encephalopathy (30 May 2022 10:56)      88y  Male       Past Medical & Surgical Hx:  PAST MEDICAL & SURGICAL HISTORY:  HTN (hypertension)      HLD (hyperlipidemia)      Dementia      Prostate cancer              Social Hx:    FAMILY HISTORY:      Allergies    penicillin (Hives)    Intolerances             REVIEW OF SYSTEMS:  CONSTITUTIONAL:  No Fever or chills  HEENT:  No diplopia or blurred vision.  No earache, sore throat or runny nose.  CARDIOVASCULAR:  No pressure, squeezing, strangling, tightness, heaviness or aching about the chest, neck, axilla or epigastrium.  RESPIRATORY:  No cough, shortness of breath  GASTROINTESTINAL:  No nausea, vomiting or diarrhea.  GENITOURINARY:  No dysuria, frequency or urgency. No Blood in urine  MUSCULOSKELETAL:  no joint aches, no muscle pain  SKIN:  No change in skin, hair or nails.  NEUROLOGIC:  No Headaches, seizures or weakness.  PSYCHIATRIC:  No disorder of thought or mood.  ENDOCRINE:  No heat or cold intolerance  HEMATOLOGICAL:  No easy bruising or bleeding.       Physical Exam:    GEN: NAD, pleasant  HEENT: normocephalic and atraumatic. EOMI. PERRL.  Anicteric  NECK: Supple.   LUNGS: Clear to auscultation.  HEART: Regular rate and rhythm without murmur.  ABDOMEN: Soft, nontender, and nondistended.  Positive bowel sounds.    : No CVA tenderness  EXTREMITIES: Without any edema.  MSK: No joint swelling  NEUROLOGIC: Cranial nerves II through XII are grossly intact. No Focal Deficits  PSYCHIATRIC: Appropriate affect .  SKIN: No Rash      Weight (kg): 86.6 (05-30 @ 06:27)    Vitals:    T(F): 97.4 (30 May 2022 11:44), Max: 97.9 (29 May 2022 18:37)  HR: 65 (30 May 2022 11:44)  BP: 146/70 (30 May 2022 11:44)  RR: 17 (30 May 2022 11:44)  SpO2: 90% (30 May 2022 11:44) (88% - 92%)  temp max in last 48H T(F): , Max: 99 (05-29-22 @ 05:01)    Current Antibiotics:  cefTRIAXone   IVPB 2000 milliGRAM(s) IV Intermittent every 24 hours    Other medications:  atorvastatin 20 milliGRAM(s) Oral at bedtime  diltiazem    Tablet 60 milliGRAM(s) Oral four times a day  donepezil 10 milliGRAM(s) Oral at bedtime  heparin   Injectable 5000 Unit(s) SubCutaneous every 12 hours  metoprolol succinate ER 50 milliGRAM(s) Oral daily  mirtazapine 7.5 milliGRAM(s) Oral at bedtime  predniSONE   Tablet 5 milliGRAM(s) Oral daily  sodium chloride 0.9% lock flush 3 milliLiter(s) IV Push every 8 hours  traZODone 100 milliGRAM(s) Oral at bedtime  venlafaxine XR. 75 milliGRAM(s) Oral daily                            14.6   5.70  )-----------( 150      ( 29 May 2022 06:12 )             45.0     05-29    143  |  106  |  18.8  ----------------------------<  82  3.9   |  21.0<L>  |  0.98    Ca    8.0<L>      29 May 2022 06:12      RECENT CULTURES:  05-28 @ 10:45 .Blood Blood-Peripheral     No growth at 48 hours        05-28 @ 10:44 .Blood Blood-Peripheral     No growth at 48 hours        05-28 @ 05:26 Clean Catch Clean Catch (Midstream)     >100,000 CFU/ml Proteus mirabilis        05-27 @ 22:20          Detected      WBC Count: 5.70 K/uL (05-29-22 @ 06:12)  WBC Count: 7.62 K/uL (05-27-22 @ 20:56)    Creatinine, Serum: 0.98 mg/dL (05-29-22 @ 06:12)  Creatinine, Serum: 1.00 mg/dL (05-27-22 @ 20:56)             SARS-CoV-2: Detected (05-27-22 @ 22:20)   [facility]  INFECTIOUS DISEASES AND INTERNAL MEDICINE at [place]  =======================================================  Jack Hooper MD  Diplomates American Board of Internal Medicine and Infectious Diseases  Tel: [phone]      Fax: [phone]  =======================================================      [id number]  DOMONIQUE MAST    CC: Patient is a 88y old  Male who presents with a chief complaint of UTI  COVID  Metabolic Encephalopathy (30 May 2022 10:56)      89 y/o male with reported Dementia sent from [place] for increased confusion, diarrhea, and positive covid test. Patient unable to provide any meaningful history, no paperwork or phone numbers available to speak to family for collateral information. Per chart review patient has a history of dementia HLD, prostate cancer, and HTN. ED w/u with stable vitals, non-focal exam. Found to be COVID pos, no hypoxia, or infiltrates on CXR. U/A grossly positive. No CVA tenderness noted. Patient cultured, given IV abx and IVF. Spoke with Olivia Chowdary on call at [place] who didnt have meds or other medical info but is going to have it called over/fax information to [facility] today. Patient at this time offers no c/o pain, CP, SOB, HA, fever or chills.         Past Medical & Surgical Hx:  PAST MEDICAL & SURGICAL HISTORY:  HTN (hypertension)      HLD (hyperlipidemia)      Dementia      Prostate cancer              Social Hx: non smoker    FAMILY HISTORY:      Allergies    penicillin (Hives)    Intolerances             REVIEW OF SYSTEMS:  CONSTITUTIONAL:  No Fever or chills  HEENT:  No diplopia or blurred vision.  No earache, sore throat or runny nose.  CARDIOVASCULAR:  No pressure, squeezing, strangling, tightness, heaviness or aching about the chest, neck, axilla or epigastrium.  RESPIRATORY:  No cough, shortness of breath  GASTROINTESTINAL:  No nausea, vomiting or diarrhea.  GENITOURINARY:  No dysuria, frequency or urgency. No Blood in urine  MUSCULOSKELETAL:  no joint aches, no muscle pain  SKIN:  No change in skin, hair or nails.  NEUROLOGIC:  No Headaches, seizures or weakness.  PSYCHIATRIC:  No disorder of thought or mood.  ENDOCRINE:  No heat or cold intolerance  HEMATOLOGICAL:  No easy bruising or bleeding.       Physical Exam:    GEN: NAD, pleasant  HEENT: normocephalic and atraumatic. EOMI. PERRL.  Anicteric  NECK: Supple.   LUNGS: Clear to auscultation.  HEART: Regular rate and rhythm without murmur.  ABDOMEN: Soft, nontender, and nondistended.  Positive bowel sounds.    : No CVA tenderness  EXTREMITIES: Without any edema.  MSK: No joint swelling  NEUROLOGIC: Cranial nerves II through XII are grossly intact. No Focal Deficits  PSYCHIATRIC: Appropriate affect .  SKIN: No Rash      Weight (kg): 86.6 (05-30 @ 06:27)    Vitals:    T(F): 97.4 (30 May 2022 11:44), Max: 97.9 (29 May 2022 18:37)  HR: 65 (30 May 2022 11:44)  BP: 146/70 (30 May 2022 11:44)  RR: 17 (30 May 2022 11:44)  SpO2: 90% (30 May 2022 11:44) (88% - 92%)  temp max in last 48H T(F): , Max: 99 (05-29-22 @ 05:01)    Current Antibiotics:  cefTRIAXone   IVPB 2000 milliGRAM(s) IV Intermittent every 24 hours    Other medications:  atorvastatin 20 milliGRAM(s) Oral at bedtime  diltiazem    Tablet 60 milliGRAM(s) Oral four times a day  donepezil 10 milliGRAM(s) Oral at bedtime  heparin   Injectable 5000 Unit(s) SubCutaneous every 12 hours  metoprolol succinate ER 50 milliGRAM(s) Oral daily  mirtazapine 7.5 milliGRAM(s) Oral at bedtime  predniSONE   Tablet 5 milliGRAM(s) Oral daily  sodium chloride 0.9% lock flush 3 milliLiter(s) IV Push every 8 hours  traZODone 100 milliGRAM(s) Oral at bedtime  venlafaxine XR. 75 milliGRAM(s) Oral daily                            14.6   5.70  )-----------( 150      ( 29 May 2022 06:12 )             45.0     05-29    143  |  106  |  18.8  ----------------------------<  82  3.9   |  21.0<L>  |  0.98    Ca    8.0<L>      29 May 2022 06:12      RECENT CULTURES:  05-28 @ 10:45 .Blood Blood-Peripheral     No growth at 48 hours        05-28 @ 10:44 .Blood Blood-Peripheral     No growth at 48 hours        05-28 @ 05:26 Clean Catch Clean Catch (Midstream)     >100,000 CFU/ml Proteus mirabilis        05-27 @ 22:20          Detected      WBC Count: 5.70 K/uL (05-29-22 @ 06:12)  WBC Count: 7.62 K/uL (05-27-22 @ 20:56)    Creatinine, Serum: 0.98 mg/dL (05-29-22 @ 06:12)  Creatinine, Serum: 1.00 mg/dL (05-27-22 @ 20:56)             SARS-CoV-2: Detected (05-27-22 @ 22:20)    < from: Xray Chest 1 View- PORTABLE-Urgent (Xray Chest 1 View- PORTABLE-Urgent .) (05.27.22 @ 19:56) >    INTERPRETATION:  HISTORY: ; ams;  TECHNIQUE: Portable frontal view of the chest, 1 view.  COMPARISON none.  FINDINGS/  IMPRESSION:    HEART:difficult to access in this projection  LUNGS: Possible retrocardiac infiltrate. PA/lateral chest suggested.  BONES: degenerative changes      --- End of Report ---    < end of copied text >

## 2022-05-31 LAB
-  AMIKACIN: SIGNIFICANT CHANGE UP
-  AMOXICILLIN/CLAVULANIC ACID: SIGNIFICANT CHANGE UP
-  AMPICILLIN/SULBACTAM: SIGNIFICANT CHANGE UP
-  AMPICILLIN: SIGNIFICANT CHANGE UP
-  AZTREONAM: SIGNIFICANT CHANGE UP
-  CEFAZOLIN: SIGNIFICANT CHANGE UP
-  CEFEPIME: SIGNIFICANT CHANGE UP
-  CEFOXITIN: SIGNIFICANT CHANGE UP
-  CEFTRIAXONE: SIGNIFICANT CHANGE UP
-  CIPROFLOXACIN: SIGNIFICANT CHANGE UP
-  ERTAPENEM: SIGNIFICANT CHANGE UP
-  GENTAMICIN: SIGNIFICANT CHANGE UP
-  LEVOFLOXACIN: SIGNIFICANT CHANGE UP
-  MEROPENEM: SIGNIFICANT CHANGE UP
-  NITROFURANTOIN: SIGNIFICANT CHANGE UP
-  PIPERACILLIN/TAZOBACTAM: SIGNIFICANT CHANGE UP
-  TOBRAMYCIN: SIGNIFICANT CHANGE UP
-  TRIMETHOPRIM/SULFAMETHOXAZOLE: SIGNIFICANT CHANGE UP
ALBUMIN SERPL ELPH-MCNC: 2.8 G/DL — LOW (ref 3.3–5.2)
ALP SERPL-CCNC: 67 U/L — SIGNIFICANT CHANGE UP (ref 40–120)
ALT FLD-CCNC: 8 U/L — SIGNIFICANT CHANGE UP
ANION GAP SERPL CALC-SCNC: 11 MMOL/L — SIGNIFICANT CHANGE UP (ref 5–17)
AST SERPL-CCNC: 14 U/L — SIGNIFICANT CHANGE UP
BILIRUB DIRECT SERPL-MCNC: 0.1 MG/DL — SIGNIFICANT CHANGE UP (ref 0–0.3)
BILIRUB INDIRECT FLD-MCNC: 0.2 MG/DL — SIGNIFICANT CHANGE UP (ref 0.2–1)
BILIRUB SERPL-MCNC: 0.3 MG/DL — LOW (ref 0.4–2)
BUN SERPL-MCNC: 21.7 MG/DL — HIGH (ref 8–20)
CALCIUM SERPL-MCNC: 7.9 MG/DL — LOW (ref 8.6–10.2)
CHLORIDE SERPL-SCNC: 105 MMOL/L — SIGNIFICANT CHANGE UP (ref 98–107)
CO2 SERPL-SCNC: 24 MMOL/L — SIGNIFICANT CHANGE UP (ref 22–29)
CREAT SERPL-MCNC: 0.94 MG/DL — SIGNIFICANT CHANGE UP (ref 0.5–1.3)
CRP SERPL-MCNC: 10 MG/L — HIGH
CULTURE RESULTS: SIGNIFICANT CHANGE UP
D DIMER BLD IA.RAPID-MCNC: 3471 NG/ML DDU — HIGH
EGFR: 78 ML/MIN/1.73M2 — SIGNIFICANT CHANGE UP
ERYTHROCYTE [SEDIMENTATION RATE] IN BLOOD: 32 MM/HR — HIGH (ref 0–20)
FERRITIN SERPL-MCNC: 150 NG/ML — SIGNIFICANT CHANGE UP (ref 30–400)
GLUCOSE SERPL-MCNC: 142 MG/DL — HIGH (ref 70–99)
HCT VFR BLD CALC: 41 % — SIGNIFICANT CHANGE UP (ref 39–50)
HGB BLD-MCNC: 13.8 G/DL — SIGNIFICANT CHANGE UP (ref 13–17)
MAGNESIUM SERPL-MCNC: 1.9 MG/DL — SIGNIFICANT CHANGE UP (ref 1.6–2.6)
MCHC RBC-ENTMCNC: 31.9 PG — SIGNIFICANT CHANGE UP (ref 27–34)
MCHC RBC-ENTMCNC: 33.7 GM/DL — SIGNIFICANT CHANGE UP (ref 32–36)
MCV RBC AUTO: 94.7 FL — SIGNIFICANT CHANGE UP (ref 80–100)
METHOD TYPE: SIGNIFICANT CHANGE UP
ORGANISM # SPEC MICROSCOPIC CNT: SIGNIFICANT CHANGE UP
ORGANISM # SPEC MICROSCOPIC CNT: SIGNIFICANT CHANGE UP
PHOSPHATE SERPL-MCNC: 2.3 MG/DL — LOW (ref 2.4–4.7)
PLATELET # BLD AUTO: 128 K/UL — LOW (ref 150–400)
POTASSIUM SERPL-MCNC: 4.6 MMOL/L — SIGNIFICANT CHANGE UP (ref 3.5–5.3)
POTASSIUM SERPL-SCNC: 4.6 MMOL/L — SIGNIFICANT CHANGE UP (ref 3.5–5.3)
PROCALCITONIN SERPL-MCNC: 0.07 NG/ML — SIGNIFICANT CHANGE UP (ref 0.02–0.1)
PROCALCITONIN SERPL-MCNC: 0.08 NG/ML — SIGNIFICANT CHANGE UP (ref 0.02–0.1)
PROT SERPL-MCNC: 5.8 G/DL — LOW (ref 6.6–8.7)
RBC # BLD: 4.33 M/UL — SIGNIFICANT CHANGE UP (ref 4.2–5.8)
RBC # FLD: 14.3 % — SIGNIFICANT CHANGE UP (ref 10.3–14.5)
SODIUM SERPL-SCNC: 140 MMOL/L — SIGNIFICANT CHANGE UP (ref 135–145)
SPECIMEN SOURCE: SIGNIFICANT CHANGE UP
WBC # BLD: 3.34 K/UL — LOW (ref 3.8–10.5)
WBC # FLD AUTO: 3.34 K/UL — LOW (ref 3.8–10.5)

## 2022-05-31 PROCEDURE — 99232 SBSQ HOSP IP/OBS MODERATE 35: CPT

## 2022-05-31 PROCEDURE — 93970 EXTREMITY STUDY: CPT | Mod: 26

## 2022-05-31 RX ORDER — SODIUM,POTASSIUM PHOSPHATES 278-250MG
1 POWDER IN PACKET (EA) ORAL ONCE
Refills: 0 | Status: COMPLETED | OUTPATIENT
Start: 2022-05-31 | End: 2022-05-31

## 2022-05-31 RX ORDER — ENOXAPARIN SODIUM 100 MG/ML
85 INJECTION SUBCUTANEOUS EVERY 12 HOURS
Refills: 0 | Status: DISCONTINUED | OUTPATIENT
Start: 2022-05-31 | End: 2022-06-01

## 2022-05-31 RX ADMIN — Medication 60 MILLIGRAM(S): at 23:14

## 2022-05-31 RX ADMIN — MIRTAZAPINE 7.5 MILLIGRAM(S): 45 TABLET, ORALLY DISINTEGRATING ORAL at 21:19

## 2022-05-31 RX ADMIN — CEFTRIAXONE 100 MILLIGRAM(S): 500 INJECTION, POWDER, FOR SOLUTION INTRAMUSCULAR; INTRAVENOUS at 03:27

## 2022-05-31 RX ADMIN — REMDESIVIR 500 MILLIGRAM(S): 5 INJECTION INTRAVENOUS at 11:23

## 2022-05-31 RX ADMIN — SODIUM CHLORIDE 3 MILLILITER(S): 9 INJECTION INTRAMUSCULAR; INTRAVENOUS; SUBCUTANEOUS at 21:37

## 2022-05-31 RX ADMIN — Medication 60 MILLIGRAM(S): at 16:31

## 2022-05-31 RX ADMIN — Medication 75 MILLIGRAM(S): at 11:22

## 2022-05-31 RX ADMIN — Medication 60 MILLIGRAM(S): at 05:32

## 2022-05-31 RX ADMIN — HEPARIN SODIUM 5000 UNIT(S): 5000 INJECTION INTRAVENOUS; SUBCUTANEOUS at 05:33

## 2022-05-31 RX ADMIN — DONEPEZIL HYDROCHLORIDE 10 MILLIGRAM(S): 10 TABLET, FILM COATED ORAL at 21:19

## 2022-05-31 RX ADMIN — Medication 100 MILLIGRAM(S): at 21:19

## 2022-05-31 RX ADMIN — ATORVASTATIN CALCIUM 20 MILLIGRAM(S): 80 TABLET, FILM COATED ORAL at 21:19

## 2022-05-31 RX ADMIN — SODIUM CHLORIDE 3 MILLILITER(S): 9 INJECTION INTRAMUSCULAR; INTRAVENOUS; SUBCUTANEOUS at 11:28

## 2022-05-31 RX ADMIN — ENOXAPARIN SODIUM 85 MILLIGRAM(S): 100 INJECTION SUBCUTANEOUS at 16:31

## 2022-05-31 RX ADMIN — Medication 50 MILLIGRAM(S): at 05:33

## 2022-05-31 RX ADMIN — Medication 60 MILLIGRAM(S): at 00:02

## 2022-05-31 RX ADMIN — SODIUM CHLORIDE 3 MILLILITER(S): 9 INJECTION INTRAMUSCULAR; INTRAVENOUS; SUBCUTANEOUS at 06:32

## 2022-05-31 RX ADMIN — Medication 60 MILLIGRAM(S): at 11:22

## 2022-05-31 RX ADMIN — Medication 1 PACKET(S): at 09:58

## 2022-05-31 RX ADMIN — Medication 6 MILLIGRAM(S): at 05:33

## 2022-05-31 NOTE — PROGRESS NOTE ADULT - ASSESSMENT
87 y/o male with reported Dementia sent from [place] for increased confusion, diarrhea, and positive covid test. Patient unable to provide any meaningful history, no paperwork or phone numbers available to speak to family for collateral information. Per chart review patient has a history of dementia HLD, prostate cancer, and HTN. ED w/u with stable vitals, non-focal exam. Found to be COVID pos, no hypoxia, or infiltrates on CXR. U/A grossly positive.     COVID 19 + infection    - pt denies symptoms, unclear how long he is + or duration of symptoms if any. per son vaccinated + booster x 2  - Remdesivir 200 mg x 1 and then 100 mg daily   - Decadron 6mg IV/PO daily while on remdesivir - Dc prednisone, per son unclear why he is on it but has been on it for a long time  - VTE prophylaxis per current [facility] system COVID 19 protocol  - Check CBC with diff, CMP with LFT's daily, Inflammatory markers, D dimer, procalcitonin q48h-d dimer elevated, agree with USG LE r/o dvt, trend d dimer  - Encourage self proning q2h, incentive spirometry and ambulation as tolerated  - Inpatient Contact/Airborne isolation     UTI  - afebrile  - per son prone to UTI for the past 2-3y  - c/w ceftriaxone  - f/u Ucx proteus (S) to ceftriaxone  - BCX ngtd  - USG renal as above          Will follow

## 2022-05-31 NOTE — PROGRESS NOTE ADULT - ASSESSMENT
89 y/o male with Hx of MDD, Dementia, HLD, HTN, prostate cancer presents with reported Dementia sent from [place] for increased confusion, diarrhea, and positive covid test. Admitted with UTI, COVID, Metabolic encephalopathy.     #COVID 19  - s/p vaccination and booster, diagnosed 5/27  #Acute hypoxia reaspiratory failure - worsening O2, 90% on RA  -CXR w/o infiltrates  -c/w Isolation  -c/w Supportive care  -ID consulted, harsh recs  -d/w ID, will start Remdesivir and Decadron x 3 days  -chest PT  -D-dimer > 3000, will check LE doppler and start empiric therapeutic dose Lovenox    #UTI  #Metabolic encephalopathy   -c/w Rocephin   -UCx w/ proteus, CTX sensitive, will plan for 7 day therapy course  -Bladder scan post void   -No flank pain, fever, leukocytosis  -OOB, ambulate     #Dementia  -Fall risk  -restart Donepezil, Mirtazapine, Trazodone, Venlafaxine   -obtain bedside swallow, will resume diet afterwards  -aspiration precaution     #HLD  #HTN  -c/w Metoprolol and Cardizem   -DASH diet    #Prostate cancer  #Overactive bladder   -takes Gemtesa at home     DVT ppx: Heparin SQ   Diet: Regular  Dispo: back to [place] in 1-2 days, pending clinical course    PT eval pending

## 2022-05-31 NOTE — PHYSICAL THERAPY INITIAL EVALUATION ADULT - ADDITIONAL COMMENTS
Pt is a questionable historian. per chart pt is from the [facility]. Pt reports he uses a RW to walk to food.

## 2022-06-01 ENCOUNTER — TRANSCRIPTION ENCOUNTER (OUTPATIENT)
Age: 87
End: 2022-06-01

## 2022-06-01 LAB
ALBUMIN SERPL ELPH-MCNC: 3 G/DL — LOW (ref 3.3–5.2)
ALP SERPL-CCNC: 74 U/L — SIGNIFICANT CHANGE UP (ref 40–120)
ALT FLD-CCNC: 9 U/L — SIGNIFICANT CHANGE UP
ANION GAP SERPL CALC-SCNC: 12 MMOL/L — SIGNIFICANT CHANGE UP (ref 5–17)
AST SERPL-CCNC: 13 U/L — SIGNIFICANT CHANGE UP
BASOPHILS # BLD AUTO: 0.01 K/UL — SIGNIFICANT CHANGE UP (ref 0–0.2)
BASOPHILS NFR BLD AUTO: 0.1 % — SIGNIFICANT CHANGE UP (ref 0–2)
BILIRUB SERPL-MCNC: 0.3 MG/DL — LOW (ref 0.4–2)
BUN SERPL-MCNC: 29.8 MG/DL — HIGH (ref 8–20)
CALCIUM SERPL-MCNC: 7.8 MG/DL — LOW (ref 8.6–10.2)
CHLORIDE SERPL-SCNC: 105 MMOL/L — SIGNIFICANT CHANGE UP (ref 98–107)
CO2 SERPL-SCNC: 23 MMOL/L — SIGNIFICANT CHANGE UP (ref 22–29)
CREAT SERPL-MCNC: 1.07 MG/DL — SIGNIFICANT CHANGE UP (ref 0.5–1.3)
EGFR: 67 ML/MIN/1.73M2 — SIGNIFICANT CHANGE UP
EOSINOPHIL # BLD AUTO: 0 K/UL — SIGNIFICANT CHANGE UP (ref 0–0.5)
EOSINOPHIL NFR BLD AUTO: 0 % — SIGNIFICANT CHANGE UP (ref 0–6)
GLUCOSE SERPL-MCNC: 119 MG/DL — HIGH (ref 70–99)
HCT VFR BLD CALC: 44.6 % — SIGNIFICANT CHANGE UP (ref 39–50)
HGB BLD-MCNC: 14.7 G/DL — SIGNIFICANT CHANGE UP (ref 13–17)
IMM GRANULOCYTES NFR BLD AUTO: 0.5 % — SIGNIFICANT CHANGE UP (ref 0–1.5)
LYMPHOCYTES # BLD AUTO: 0.36 K/UL — LOW (ref 1–3.3)
LYMPHOCYTES # BLD AUTO: 3.8 % — LOW (ref 13–44)
MAGNESIUM SERPL-MCNC: 2 MG/DL — SIGNIFICANT CHANGE UP (ref 1.6–2.6)
MCHC RBC-ENTMCNC: 31.4 PG — SIGNIFICANT CHANGE UP (ref 27–34)
MCHC RBC-ENTMCNC: 33 GM/DL — SIGNIFICANT CHANGE UP (ref 32–36)
MCV RBC AUTO: 95.3 FL — SIGNIFICANT CHANGE UP (ref 80–100)
MONOCYTES # BLD AUTO: 0.38 K/UL — SIGNIFICANT CHANGE UP (ref 0–0.9)
MONOCYTES NFR BLD AUTO: 4 % — SIGNIFICANT CHANGE UP (ref 2–14)
NEUTROPHILS # BLD AUTO: 8.75 K/UL — HIGH (ref 1.8–7.4)
NEUTROPHILS NFR BLD AUTO: 91.6 % — HIGH (ref 43–77)
PHOSPHATE SERPL-MCNC: 2.4 MG/DL — SIGNIFICANT CHANGE UP (ref 2.4–4.7)
PLATELET # BLD AUTO: 145 K/UL — LOW (ref 150–400)
POTASSIUM SERPL-MCNC: 4.1 MMOL/L — SIGNIFICANT CHANGE UP (ref 3.5–5.3)
POTASSIUM SERPL-SCNC: 4.1 MMOL/L — SIGNIFICANT CHANGE UP (ref 3.5–5.3)
PROT SERPL-MCNC: 5.7 G/DL — LOW (ref 6.6–8.7)
RBC # BLD: 4.68 M/UL — SIGNIFICANT CHANGE UP (ref 4.2–5.8)
RBC # FLD: 14.5 % — SIGNIFICANT CHANGE UP (ref 10.3–14.5)
SODIUM SERPL-SCNC: 140 MMOL/L — SIGNIFICANT CHANGE UP (ref 135–145)
WBC # BLD: 9.55 K/UL — SIGNIFICANT CHANGE UP (ref 3.8–10.5)
WBC # FLD AUTO: 9.55 K/UL — SIGNIFICANT CHANGE UP (ref 3.8–10.5)

## 2022-06-01 PROCEDURE — 99232 SBSQ HOSP IP/OBS MODERATE 35: CPT

## 2022-06-01 RX ORDER — HEPARIN SODIUM 5000 [USP'U]/ML
5000 INJECTION INTRAVENOUS; SUBCUTANEOUS EVERY 12 HOURS
Refills: 0 | Status: DISCONTINUED | OUTPATIENT
Start: 2022-06-01 | End: 2022-06-02

## 2022-06-01 RX ORDER — RIVAROXABAN 15 MG-20MG
1 KIT ORAL
Qty: 30 | Refills: 0
Start: 2022-06-01 | End: 2022-06-30

## 2022-06-01 RX ADMIN — CEFTRIAXONE 100 MILLIGRAM(S): 500 INJECTION, POWDER, FOR SOLUTION INTRAMUSCULAR; INTRAVENOUS at 02:44

## 2022-06-01 RX ADMIN — MIRTAZAPINE 7.5 MILLIGRAM(S): 45 TABLET, ORALLY DISINTEGRATING ORAL at 23:26

## 2022-06-01 RX ADMIN — Medication 50 MILLIGRAM(S): at 08:41

## 2022-06-01 RX ADMIN — Medication 60 MILLIGRAM(S): at 08:40

## 2022-06-01 RX ADMIN — Medication 6 MILLIGRAM(S): at 06:15

## 2022-06-01 RX ADMIN — Medication 60 MILLIGRAM(S): at 23:26

## 2022-06-01 RX ADMIN — SODIUM CHLORIDE 3 MILLILITER(S): 9 INJECTION INTRAMUSCULAR; INTRAVENOUS; SUBCUTANEOUS at 23:08

## 2022-06-01 RX ADMIN — HEPARIN SODIUM 5000 UNIT(S): 5000 INJECTION INTRAVENOUS; SUBCUTANEOUS at 17:24

## 2022-06-01 RX ADMIN — Medication 60 MILLIGRAM(S): at 17:24

## 2022-06-01 RX ADMIN — ATORVASTATIN CALCIUM 20 MILLIGRAM(S): 80 TABLET, FILM COATED ORAL at 23:26

## 2022-06-01 RX ADMIN — DONEPEZIL HYDROCHLORIDE 10 MILLIGRAM(S): 10 TABLET, FILM COATED ORAL at 23:26

## 2022-06-01 RX ADMIN — SODIUM CHLORIDE 3 MILLILITER(S): 9 INJECTION INTRAMUSCULAR; INTRAVENOUS; SUBCUTANEOUS at 06:13

## 2022-06-01 RX ADMIN — ENOXAPARIN SODIUM 85 MILLIGRAM(S): 100 INJECTION SUBCUTANEOUS at 06:18

## 2022-06-01 NOTE — DISCHARGE NOTE PROVIDER - HOSPITAL COURSE
87 y/o male with Hx of MDD, Dementia, HLD, HTN, prostate cancer presents with reported Dementia sent from [place] for increased confusion, diarrhea, and positive covid test. Admitted with UTI, COVID, Metabolic encephalopathy. Pt was treated with 3 days of remdesivir/decadron, O2 was weaned to room air. Ddimer > 3000, LE duplex negative for DVT. Ucx grew proteus, pansensitive. Will complete oral abx on discharge. Seen in consult by PT, recommends return to [place]. No further inpattient complications. Stable for DC.          89 y/o male with Hx of MDD, Dementia, HLD, HTN, prostate cancer presents with reported Dementia sent from [place] for increased confusion, diarrhea, and positive covid test. Admitted with UTI, COVID, Metabolic encephalopathy. Pt was treated with 3 days of remdesivir/decadron, O2 was weaned to room air. Ddimer > 3000, LE duplex negative for DVT, will have pt complete 30 day course of Xarelto for prophylaxis on dc. cx grew proteus, pansensitive. Will complete oral abx on discharge. Seen in consult by PT, recommends return to [place]. No further inpattient complications. Stable for DC.          89 y/o male with Hx of MDD, Dementia, HLD, HTN, prostate cancer presents with reported Dementia sent from [place] for increased confusion, diarrhea, and positive covid test. Admitted with UTI, COVID, Metabolic encephalopathy. Pt was treated with 3 days of remdesivir/decadron, O2 was weaned to room air. Ddimer > 3000, LE duplex negative for DVT, with negative trop and , will have pt complete 30 day course of Xarelto for prophylaxis on dc. cx grew proteus, pansensitive. Will complete oral abx on discharge. Seen in consult by PT, recommends return to [place]. No further inpattient complications. Stable for DC.

## 2022-06-01 NOTE — PROGRESS NOTE ADULT - ASSESSMENT
89 y/o male with Hx of MDD, Dementia, HLD, HTN, prostate cancer presents with reported Dementia sent from [place] for increased confusion, diarrhea, and positive covid test. Admitted with UTI, COVID, Metabolic encephalopathy.     #COVID 19  - s/p vaccination and booster, diagnosed 5/27  #Acute hypoxia reaspiratory failure - worsening O2, 90% on RA  -CXR w/o infiltrates  -c/w Isolation  -c/w Supportive care  -ID consulted, harsh recs  -d/w ID, will start Remdesivir and Decadron x 3 days  -chest PT  -D-dimer > 3000, LE doppler neg for DVT, patient non hypoxic. Pro  and negative trop. Will dc with Xarelto 10mg x 30 days    #UTI  #Metabolic encephalopathy   -c/w Rocephin   -UCx w/ proteus, CTX sensitive, will plan for 14 day therapy with Vantin   -Bladder scan post void   -No flank pain, fever, leukocytosis  -OOB, ambulate     #Dementia  -Fall risk  -restart Donepezil, Mirtazapine, Trazodone, Venlafaxine   -obtain bedside swallow, will resume diet afterwards  -aspiration precaution     #HLD  #HTN  -c/w Metoprolol and Cardizem   -DASH diet    #Prostate cancer  #Overactive bladder   -takes Gemtesa at home     DVT ppx: Heparin SQ   Diet: Regular  Dispo: back to [place]      87 y/o male with Hx of MDD, Dementia, HLD, HTN, prostate cancer presents with reported Dementia sent from [place] for increased confusion, diarrhea, and positive covid test. Admitted with UTI, COVID, Metabolic encephalopathy.     #COVID 19  - s/p vaccination and booster, diagnosed 5/27  #Acute hypoxia reaspiratory failure - worsening O2, 90% on RA  -CXR w/o infiltrates  -c/w Isolation  -c/w Supportive care  -ID consulted, harsh recs  -d/w ID, will start Remdesivir and Decadron x 3 days  -chest PT  -D-dimer > 3000, LE doppler neg for DVT, patient non hypoxic. Pro  and negative trop. Will dc with Xarelto 10mg x 30 days    #UTI  #Metabolic encephalopathy   -c/w Rocephin   -UCx w/ proteus, CTX sensitive, will plan for 10 day therapy with Vantin   -Bladder scan post void   -No flank pain, fever, leukocytosis  -OOB, ambulate     #Dementia  -Fall risk  -restart Donepezil, Mirtazapine, Trazodone, Venlafaxine   -obtain bedside swallow, will resume diet afterwards  -aspiration precaution     #HLD  #HTN  -c/w Metoprolol and Cardizem   -DASH diet    #Prostate cancer  #Overactive bladder   -takes Gemtesa at home     DVT ppx: Heparin SQ   Diet: Regular  Dispo: back to [place]

## 2022-06-01 NOTE — DISCHARGE NOTE PROVIDER - ATTENDING DISCHARGE PHYSICAL EXAMINATION:
CONSTITUTIONAL: NAD, well-groomed  ENMT: Moist oral mucosa, no pharyngeal injection or exudates; normal dentition  RESPIRATORY: Normal respiratory effort; lungs are clear to auscultation bilaterally  CARDIOVASCULAR: Regular rate and rhythm, normal S1 and S2, no murmur/rub/gallop; No lower extremity edema; Peripheral pulses are 2+ bilaterally  ABDOMEN: Nontender to palpation, normoactive bowel sounds, no rebound/guarding; No hepatosplenomegaly  MUSCLOSKELETAL:  Normal gait; no clubbing or cyanosis of digits; no joint swelling or tenderness to palpation  PSYCH: A+O x1 (name); confused  NEUROLOGY: CN 2-12 are intact and symmetric; no gross sensory deficits;   SKIN: No rashes; no palpable lesions

## 2022-06-01 NOTE — PROGRESS NOTE ADULT - ASSESSMENT
89 y/o male with reported Dementia sent from [place] for increased confusion, diarrhea, and positive covid test. Patient unable to provide any meaningful history, no paperwork or phone numbers available to speak to family for collateral information. Per chart review patient has a history of dementia HLD, prostate cancer, and HTN. ED w/u with stable vitals, non-focal exam. Found to be COVID pos, no hypoxia, or infiltrates on CXR. U/A grossly positive.     COVID 19 + infection    - pt denies symptoms, unclear how long he is + or duration of symptoms if any. per son vaccinated + booster x 2  - s/p 3d remdesivir/decadron-can DC  - pt on chronic prednisone unclear indication-may need to resume  - VTE prophylaxis per current [facility] COVID 19 protocol  - Check CBC with diff, CMP with LFT's daily, Inflammatory markers, D dimer, procalcitonin q48h-d dimer elevated, USG venous doppler (-) for dvt, no s/s of PE, trend D dimer, consider CTA chest if hypoxic  - Encourage self proning q2h, incentive spirometry and ambulation as tolerated  - Inpatient Contact/Airborne isolation     UTI  - afebrile  - per son prone to UTI for the past 2-3y  - c/w ceftriaxone-when ready for DC can change to oral cefpodxime 200mg po bid to complete total 14 d of all abx  - f/u Ucx proteus (S) to ceftriaxone  - BCX ngtd  - USG renal as above          d/w PA  please call with questions

## 2022-06-01 NOTE — DISCHARGE NOTE PROVIDER - NSDCMRMEDTOKEN_GEN_ALL_CORE_FT
atorvastatin 20 mg oral tablet: 1 tab(s) orally once a day  Cardizem 60 mg oral tablet: 1 tab(s) orally 4 times a day  donepezil 10 mg oral tablet: 1 tab(s) orally once a day (at bedtime)  Gemtesa 75 mg oral tablet: 1 tab(s) orally once a day  Metoprolol Succinate ER 50 mg oral tablet, extended release: 1 tab(s) orally once a day  mirtazapine 7.5 mg oral tablet: 1 tab(s) orally once a day (at bedtime)  predniSONE 5 mg oral tablet: 1 tab(s) orally once a day  traZODone 100 mg oral tablet: 1 tab(s) orally once a day (at bedtime)  venlafaxine 75 mg oral capsule, extended release: 1 cap(s) orally once a day   atorvastatin 20 mg oral tablet: 1 tab(s) orally once a day  Cardizem 60 mg oral tablet: 1 tab(s) orally 4 times a day  cefpodoxime 200 mg oral tablet: 1 tab(s) orally 2 times a day   donepezil 10 mg oral tablet: 1 tab(s) orally once a day (at bedtime)  Gemtesa 75 mg oral tablet: 1 tab(s) orally once a day  Metoprolol Succinate ER 50 mg oral tablet, extended release: 1 tab(s) orally once a day  mirtazapine 7.5 mg oral tablet: 1 tab(s) orally once a day (at bedtime)  predniSONE 5 mg oral tablet: 1 tab(s) orally once a day  traZODone 100 mg oral tablet: 1 tab(s) orally once a day (at bedtime)  venlafaxine 75 mg oral capsule, extended release: 1 cap(s) orally once a day  Xarelto 10 mg oral tablet: 1 tab(s) orally once a day    atorvastatin 20 mg oral tablet: 1 tab(s) orally once a day  Cardizem 60 mg oral tablet: 1 tab(s) orally 4 times a day  cefpodoxime 200 mg oral tablet: 1 tab(s) orally 2 times a day through June 6th  donepezil 10 mg oral tablet: 1 tab(s) orally once a day (at bedtime)  Gemtesa 75 mg oral tablet: 1 tab(s) orally once a day  Metoprolol Succinate ER 50 mg oral tablet, extended release: 1 tab(s) orally once a day  mirtazapine 7.5 mg oral tablet: 1 tab(s) orally once a day (at bedtime)  predniSONE 5 mg oral tablet: 1 tab(s) orally once a day  traZODone 100 mg oral tablet: 1 tab(s) orally once a day (at bedtime)  venlafaxine 75 mg oral capsule, extended release: 1 cap(s) orally once a day  Xarelto 10 mg oral tablet: 1 tab(s) orally once a day End date June 29

## 2022-06-01 NOTE — DISCHARGE NOTE PROVIDER - NSDCCPCAREPLAN_GEN_ALL_CORE_FT
PRINCIPAL DISCHARGE DIAGNOSIS  Diagnosis: Toxic metabolic encephalopathy  Assessment and Plan of Treatment: Now improved.      SECONDARY DISCHARGE DIAGNOSES  Diagnosis: Urinary tract infection  Assessment and Plan of Treatment: Complete course of antibiotics as prescribed.    Diagnosis: Dementia  Assessment and Plan of Treatment: Supportive care    Diagnosis: COVID-19  Assessment and Plan of Treatment: Supportive care    Diagnosis: HTN (hypertension)  Assessment and Plan of Treatment: Metoprolol/cardizem     PRINCIPAL DISCHARGE DIAGNOSIS  Diagnosis: Toxic metabolic encephalopathy  Assessment and Plan of Treatment: Now improved. Secondary to UTI, covid and dementia.      SECONDARY DISCHARGE DIAGNOSES  Diagnosis: Urinary tract infection  Assessment and Plan of Treatment: Complete course of antibiotics as prescribed.    Diagnosis: Dementia  Assessment and Plan of Treatment: Supportive care    Diagnosis: COVID-19  Assessment and Plan of Treatment: Supportive care. Xarelto for 30 days for DVT prophylaxis    Diagnosis: HTN (hypertension)  Assessment and Plan of Treatment: Metoprolol/cardizem

## 2022-06-02 ENCOUNTER — TRANSCRIPTION ENCOUNTER (OUTPATIENT)
Age: 87
End: 2022-06-02

## 2022-06-02 VITALS
TEMPERATURE: 98 F | DIASTOLIC BLOOD PRESSURE: 68 MMHG | SYSTOLIC BLOOD PRESSURE: 140 MMHG | HEART RATE: 83 BPM | RESPIRATION RATE: 18 BRPM | OXYGEN SATURATION: 94 %

## 2022-06-02 LAB
CULTURE RESULTS: SIGNIFICANT CHANGE UP
CULTURE RESULTS: SIGNIFICANT CHANGE UP
SARS-COV-2 RNA SPEC QL NAA+PROBE: DETECTED
SPECIMEN SOURCE: SIGNIFICANT CHANGE UP
SPECIMEN SOURCE: SIGNIFICANT CHANGE UP

## 2022-06-02 PROCEDURE — 84484 ASSAY OF TROPONIN QUANT: CPT

## 2022-06-02 PROCEDURE — U0005: CPT

## 2022-06-02 PROCEDURE — 85027 COMPLETE CBC AUTOMATED: CPT

## 2022-06-02 PROCEDURE — U0003: CPT

## 2022-06-02 PROCEDURE — 93970 EXTREMITY STUDY: CPT

## 2022-06-02 PROCEDURE — 80053 COMPREHEN METABOLIC PANEL: CPT

## 2022-06-02 PROCEDURE — 82728 ASSAY OF FERRITIN: CPT

## 2022-06-02 PROCEDURE — 82962 GLUCOSE BLOOD TEST: CPT

## 2022-06-02 PROCEDURE — 87040 BLOOD CULTURE FOR BACTERIA: CPT

## 2022-06-02 PROCEDURE — 85025 COMPLETE CBC W/AUTO DIFF WBC: CPT

## 2022-06-02 PROCEDURE — 99239 HOSP IP/OBS DSCHRG MGMT >30: CPT

## 2022-06-02 PROCEDURE — 86140 C-REACTIVE PROTEIN: CPT

## 2022-06-02 PROCEDURE — 85652 RBC SED RATE AUTOMATED: CPT

## 2022-06-02 PROCEDURE — 84145 PROCALCITONIN (PCT): CPT

## 2022-06-02 PROCEDURE — 87077 CULTURE AEROBIC IDENTIFY: CPT

## 2022-06-02 PROCEDURE — 71045 X-RAY EXAM CHEST 1 VIEW: CPT

## 2022-06-02 PROCEDURE — 97116 GAIT TRAINING THERAPY: CPT

## 2022-06-02 PROCEDURE — 84100 ASSAY OF PHOSPHORUS: CPT

## 2022-06-02 PROCEDURE — 82550 ASSAY OF CK (CPK): CPT

## 2022-06-02 PROCEDURE — 76775 US EXAM ABDO BACK WALL LIM: CPT

## 2022-06-02 PROCEDURE — 99285 EMERGENCY DEPT VISIT HI MDM: CPT

## 2022-06-02 PROCEDURE — 81001 URINALYSIS AUTO W/SCOPE: CPT

## 2022-06-02 PROCEDURE — 87086 URINE CULTURE/COLONY COUNT: CPT

## 2022-06-02 PROCEDURE — 0225U NFCT DS DNA&RNA 21 SARSCOV2: CPT

## 2022-06-02 PROCEDURE — 80048 BASIC METABOLIC PNL TOTAL CA: CPT

## 2022-06-02 PROCEDURE — 87186 SC STD MICRODIL/AGAR DIL: CPT

## 2022-06-02 PROCEDURE — 36415 COLL VENOUS BLD VENIPUNCTURE: CPT

## 2022-06-02 PROCEDURE — 80076 HEPATIC FUNCTION PANEL: CPT

## 2022-06-02 PROCEDURE — 83735 ASSAY OF MAGNESIUM: CPT

## 2022-06-02 PROCEDURE — 83880 ASSAY OF NATRIURETIC PEPTIDE: CPT

## 2022-06-02 PROCEDURE — 85379 FIBRIN DEGRADATION QUANT: CPT

## 2022-06-02 PROCEDURE — 96374 THER/PROPH/DIAG INJ IV PUSH: CPT

## 2022-06-02 PROCEDURE — 97110 THERAPEUTIC EXERCISES: CPT

## 2022-06-02 PROCEDURE — 93005 ELECTROCARDIOGRAM TRACING: CPT

## 2022-06-02 PROCEDURE — 97530 THERAPEUTIC ACTIVITIES: CPT

## 2022-06-02 RX ORDER — RIVAROXABAN 15 MG-20MG
1 KIT ORAL
Qty: 30 | Refills: 0
Start: 2022-06-02 | End: 2022-07-01

## 2022-06-02 RX ORDER — RIVAROXABAN 15 MG-20MG
10 KIT ORAL DAILY
Refills: 0 | Status: DISCONTINUED | OUTPATIENT
Start: 2022-06-02 | End: 2022-06-02

## 2022-06-02 RX ORDER — CEFPODOXIME PROXETIL 100 MG
1 TABLET ORAL
Qty: 10 | Refills: 0
Start: 2022-06-02 | End: 2022-06-06

## 2022-06-02 RX ORDER — CEPHALEXIN 500 MG
1 CAPSULE ORAL
Qty: 20 | Refills: 0
Start: 2022-06-02 | End: 2022-06-06

## 2022-06-02 RX ADMIN — RIVAROXABAN 10 MILLIGRAM(S): KIT at 12:38

## 2022-06-02 RX ADMIN — SODIUM CHLORIDE 3 MILLILITER(S): 9 INJECTION INTRAMUSCULAR; INTRAVENOUS; SUBCUTANEOUS at 06:31

## 2022-06-02 RX ADMIN — HEPARIN SODIUM 5000 UNIT(S): 5000 INJECTION INTRAVENOUS; SUBCUTANEOUS at 06:40

## 2022-06-02 RX ADMIN — Medication 75 MILLIGRAM(S): at 12:38

## 2022-06-02 RX ADMIN — Medication 6 MILLIGRAM(S): at 06:41

## 2022-06-02 RX ADMIN — SODIUM CHLORIDE 3 MILLILITER(S): 9 INJECTION INTRAMUSCULAR; INTRAVENOUS; SUBCUTANEOUS at 15:37

## 2022-06-02 RX ADMIN — Medication 50 MILLIGRAM(S): at 06:40

## 2022-06-02 RX ADMIN — Medication 100 MILLIGRAM(S): at 03:16

## 2022-06-02 RX ADMIN — CEFTRIAXONE 100 MILLIGRAM(S): 500 INJECTION, POWDER, FOR SOLUTION INTRAMUSCULAR; INTRAVENOUS at 03:15

## 2022-06-02 RX ADMIN — REMDESIVIR 500 MILLIGRAM(S): 5 INJECTION INTRAVENOUS at 12:39

## 2022-06-02 RX ADMIN — Medication 60 MILLIGRAM(S): at 06:40

## 2022-06-02 NOTE — PROGRESS NOTE ADULT - SUBJECTIVE AND OBJECTIVE BOX
Fito Bianchi M.D.    Patient is a 88y old  Male who presents with a chief complaint of UTI  COVID  Metabolic Encephalopathy (29 May 2022 11:42)      SUBJECTIVE / OVERNIGHT EVENTS: reports feeling well. No concerns.     Patient denies chest pain, SOB, abd pain, N/V, fever, chills, dysuria or any other complaints. All remainder ROS negative.     MEDICATIONS  (STANDING):  atorvastatin 20 milliGRAM(s) Oral at bedtime  cefTRIAXone   IVPB 2000 milliGRAM(s) IV Intermittent every 24 hours  diltiazem    Tablet 60 milliGRAM(s) Oral four times a day  donepezil 10 milliGRAM(s) Oral at bedtime  heparin   Injectable 5000 Unit(s) SubCutaneous every 12 hours  metoprolol succinate ER 50 milliGRAM(s) Oral daily  mirtazapine 7.5 milliGRAM(s) Oral at bedtime  predniSONE   Tablet 5 milliGRAM(s) Oral daily  sodium chloride 0.9% lock flush 3 milliLiter(s) IV Push every 8 hours  traZODone 100 milliGRAM(s) Oral at bedtime  venlafaxine XR. 75 milliGRAM(s) Oral daily    MEDICATIONS  (PRN):  acetaminophen     Tablet .. 650 milliGRAM(s) Oral every 6 hours PRN Temp greater or equal to 38C (100.4F), Mild Pain (1 - 3)  aluminum hydroxide/magnesium hydroxide/simethicone Suspension 30 milliLiter(s) Oral every 4 hours PRN Dyspepsia  melatonin 3 milliGRAM(s) Oral at bedtime PRN Insomnia  ondansetron Injectable 4 milliGRAM(s) IV Push every 8 hours PRN Nausea and/or Vomiting      I&O's Summary    30 May 2022 07:01  -  30 May 2022 10:56  --------------------------------------------------------  IN: 0 mL / OUT: 300 mL / NET: -300 mL      PHYSICAL EXAM:  Vital Signs Last 24 Hrs  T(C): 36.4 (30 May 2022 06:27), Max: 36.6 (29 May 2022 18:37)  T(F): 97.6 (30 May 2022 06:27), Max: 97.9 (29 May 2022 18:37)  HR: 71 (30 May 2022 06:27) (67 - 71)  BP: 159/88 (30 May 2022 06:27) (131/72 - 159/88)  BP(mean): --  RR: 18 (30 May 2022 06:27) (18 - 18)  SpO2: 92% (30 May 2022 06:27) (88% - 96%)    CONSTITUTIONAL: NAD, well-groomed, hard of hearing  ENMT: Moist oral mucosa, no pharyngeal injection or exudates; normal dentition  RESPIRATORY: Normal respiratory effort; lungs are clear to auscultation bilaterally  CARDIOVASCULAR: Regular rate and rhythm, normal S1 and S2, no murmur/rub/gallop; No lower extremity edema; Peripheral pulses are 2+ bilaterally  ABDOMEN: Nontender to palpation, normoactive bowel sounds, no rebound/guarding; No hepatosplenomegaly  MUSCLOSKELETAL:  Normal gait; no clubbing or cyanosis of digits; no joint swelling or tenderness to palpation  PSYCH: A+O x2; calm and affect appropriate  NEUROLOGY: CN 2-12 are intact and symmetric; no gross sensory deficits;   SKIN: two erythema patch noted in anterior chest    LABS:                        14.6   5.70  )-----------( 150      ( 29 May 2022 06:12 )             45.0     05-29    143  |  106  |  18.8  ----------------------------<  82  3.9   |  21.0<L>  |  0.98    Ca    8.0<L>      29 May 2022 06:12        Culture - Urine (collected 28 May 2022 05:26)  Source: Clean Catch Clean Catch (Midstream)  Preliminary Report (30 May 2022 09:49):    >100,000 CFU/ml Proteus mirabilis      CAPILLARY BLOOD GLUCOSE        RADIOLOGY & ADDITIONAL TESTS:  Results Reviewed:   Imaging Personally Reviewed:  Electrocardiogram Personally Reviewed:
Fito Bianchi M.D.    Patient is a 88y old  Male who presents with a chief complaint of UTI  COVID  Metabolic Encephalopathy (30 May 2022 13:08)      SUBJECTIVE / OVERNIGHT EVENTS: reports feeling well.     Patient denies chest pain, SOB, abd pain, N/V, fever, chills, dysuria or any other complaints. All remainder ROS negative.     MEDICATIONS  (STANDING):  atorvastatin 20 milliGRAM(s) Oral at bedtime  cefTRIAXone   IVPB 2000 milliGRAM(s) IV Intermittent every 24 hours  dexAMETHasone  Injectable 6 milliGRAM(s) IV Push daily  diltiazem    Tablet 60 milliGRAM(s) Oral four times a day  donepezil 10 milliGRAM(s) Oral at bedtime  enoxaparin Injectable 85 milliGRAM(s) SubCutaneous every 12 hours  metoprolol succinate ER 50 milliGRAM(s) Oral daily  mirtazapine 7.5 milliGRAM(s) Oral at bedtime  remdesivir  IVPB 100 milliGRAM(s) IV Intermittent every 24 hours  remdesivir  IVPB   IV Intermittent   sodium chloride 0.9% lock flush 3 milliLiter(s) IV Push every 8 hours  traZODone 100 milliGRAM(s) Oral at bedtime  venlafaxine XR. 75 milliGRAM(s) Oral daily    MEDICATIONS  (PRN):  acetaminophen     Tablet .. 650 milliGRAM(s) Oral every 6 hours PRN Temp greater or equal to 38C (100.4F), Mild Pain (1 - 3)  aluminum hydroxide/magnesium hydroxide/simethicone Suspension 30 milliLiter(s) Oral every 4 hours PRN Dyspepsia  melatonin 3 milliGRAM(s) Oral at bedtime PRN Insomnia  ondansetron Injectable 4 milliGRAM(s) IV Push every 8 hours PRN Nausea and/or Vomiting      I&O's Summary    30 May 2022 07:01  -  31 May 2022 07:00  --------------------------------------------------------  IN: 250 mL / OUT: 300 mL / NET: -50 mL        PHYSICAL EXAM:  Vital Signs Last 24 Hrs  T(C): 36.4 (31 May 2022 07:05), Max: 36.8 (30 May 2022 14:36)  T(F): 97.5 (31 May 2022 07:05), Max: 98.3 (30 May 2022 14:36)  HR: 64 (31 May 2022 07:05) (61 - 79)  BP: 132/82 (31 May 2022 07:05) (123/78 - 165/77)  BP(mean): --  RR: 18 (31 May 2022 07:05) (16 - 18)  SpO2: 92% (31 May 2022 07:05) (90% - 97%)    CONSTITUTIONAL: NAD, well-groomed  ENMT: Moist oral mucosa, no pharyngeal injection or exudates; normal dentition  RESPIRATORY: Normal respiratory effort; lungs are clear to auscultation bilaterally  CARDIOVASCULAR: Regular rate and rhythm, normal S1 and S2, no murmur/rub/gallop; No lower extremity edema; Peripheral pulses are 2+ bilaterally  ABDOMEN: Nontender to palpation, normoactive bowel sounds, no rebound/guarding; No hepatosplenomegaly  MUSCLOSKELETAL:  Normal gait; no clubbing or cyanosis of digits; no joint swelling or tenderness to palpation  PSYCH: A+O x2 (name, place); affect appropriate  NEUROLOGY: CN 2-12 are intact and symmetric; no gross sensory deficits;   SKIN: No rashes; no palpable lesions    LABS:                        13.8   3.34  )-----------( 128      ( 31 May 2022 07:43 )             41.0     05-31    140  |  105  |  21.7<H>  ----------------------------<  142<H>  4.6   |  24.0  |  0.94    Ca    7.9<L>      31 May 2022 07:43  Phos  2.3     05-31  Mg     1.9     05-31                CAPILLARY BLOOD GLUCOSE          RADIOLOGY & ADDITIONAL TESTS:  Results Reviewed:   Imaging Personally Reviewed:  Electrocardiogram Personally Reviewed:
[facility] Division of Hospital Medicine    Chief Complaint: UTI, COVID 19    SUBJECTIVE / OVERNIGHT EVENTS:  Pt confused, says he is fine   Asking me to help cover him so that he can sleep     Not cooperative with ROS     MEDICATIONS  (STANDING):  atorvastatin 20 milliGRAM(s) Oral at bedtime  cefTRIAXone   IVPB 2000 milliGRAM(s) IV Intermittent every 24 hours  diltiazem    Tablet 60 milliGRAM(s) Oral four times a day  donepezil 10 milliGRAM(s) Oral at bedtime  heparin   Injectable 5000 Unit(s) SubCutaneous every 12 hours  metoprolol succinate ER 50 milliGRAM(s) Oral daily  mirtazapine 7.5 milliGRAM(s) Oral at bedtime  predniSONE   Tablet 5 milliGRAM(s) Oral daily  sodium chloride 0.9% lock flush 3 milliLiter(s) IV Push every 8 hours  traZODone 100 milliGRAM(s) Oral at bedtime  venlafaxine XR. 75 milliGRAM(s) Oral daily    MEDICATIONS  (PRN):  acetaminophen     Tablet .. 650 milliGRAM(s) Oral every 6 hours PRN Temp greater or equal to 38C (100.4F), Mild Pain (1 - 3)  aluminum hydroxide/magnesium hydroxide/simethicone Suspension 30 milliLiter(s) Oral every 4 hours PRN Dyspepsia  melatonin 3 milliGRAM(s) Oral at bedtime PRN Insomnia  ondansetron Injectable 4 milliGRAM(s) IV Push every 8 hours PRN Nausea and/or Vomiting        I&O's Summary      PHYSICAL EXAM:  Vital Signs Last 24 Hrs  T(C): 36.1 (28 May 2022 15:15), Max: 37.2 (28 May 2022 07:36)  T(F): 97 (28 May 2022 15:15), Max: 98.9 (28 May 2022 07:36)  HR: 68 (28 May 2022 15:15) (68 - 82)  BP: 177/85 (28 May 2022 15:15) (153/83 - 180/81)  BP(mean): 106 (28 May 2022 05:17) (106 - 106)  RR: 18 (28 May 2022 15:15) (16 - 20)  SpO2: 100% (28 May 2022 15:15) (95% - 100%)      CONSTITUTIONAL: NAD, lying in bed  ENMT: Moist oral mucosa, PERRLA, Very [place]   RESPIRATORY: Normal respiratory effort; lungs with reduced BS bilaterally  CARDIOVASCULAR: Regular rate and rhythm, normal S1 and S2, No lower extremity edema  ABDOMEN: refused  MUSCULOSKELETAL:  No clubbing or cyanosis of digits   PSYCH: A+O to person only, confused   NEUROLOGY: Uncooperative, moving all ext spontaneously   SKIN: Warm, dry       LABS:                        13.9   7.62  )-----------( 114      ( 27 May 2022 20:56 )             42.8     05    143  |  109<H>  |  18.6  ----------------------------<  91  4.2   |  22.0  |  1.00    Ca    7.9<L>      27 May 2022 20:56    TPro  5.6<L>  /  Alb  2.7<L>  /  TBili  0.6  /  DBili  x   /  AST  14  /  ALT  8   /  AlkPhos  74  05-27      CARDIAC MARKERS ( 27 May 2022 20:56 )  x     / <0.01 ng/mL / 56 U/L / x     / x          Urinalysis Basic - ( 28 May 2022 01:25 )    Color: Yellow / Appearance: very cloudy / S.015 / pH: x  Gluc: x / Ketone: Small  / Bili: Negative / Urobili: Negative   Blood: x / Protein: Negative / Nitrite: Positive   Leuk Esterase: Moderate / RBC: 6-10 /HPF / WBC 26-50 /HPF   Sq Epi: x / Non Sq Epi: Occasional / Bacteria: Moderate        CAPILLARY BLOOD GLUCOSE      POCT Blood Glucose.: 94 mg/dL (27 May 2022 17:56)  
Fito Bianchi M.D.    Patient is a 88y old  Male who presents with a chief complaint of UTI  COVID  Metabolic Encephalopathy (01 Jun 2022 14:55)      SUBJECTIVE / OVERNIGHT EVENTS: no event overnight.     Patient denies chest pain, SOB, abd pain, N/V, fever, chills, dysuria or any other complaints. All remainder ROS negative.     MEDICATIONS  (STANDING):  atorvastatin 20 milliGRAM(s) Oral at bedtime  cefTRIAXone   IVPB 2000 milliGRAM(s) IV Intermittent every 24 hours  dexAMETHasone  Injectable 6 milliGRAM(s) IV Push daily  diltiazem    Tablet 60 milliGRAM(s) Oral four times a day  donepezil 10 milliGRAM(s) Oral at bedtime  metoprolol succinate ER 50 milliGRAM(s) Oral daily  mirtazapine 7.5 milliGRAM(s) Oral at bedtime  remdesivir  IVPB 100 milliGRAM(s) IV Intermittent every 24 hours  remdesivir  IVPB   IV Intermittent   rivaroxaban 10 milliGRAM(s) Oral daily  sodium chloride 0.9% lock flush 3 milliLiter(s) IV Push every 8 hours  traZODone 100 milliGRAM(s) Oral at bedtime  venlafaxine XR. 75 milliGRAM(s) Oral daily    MEDICATIONS  (PRN):  acetaminophen     Tablet .. 650 milliGRAM(s) Oral every 6 hours PRN Temp greater or equal to 38C (100.4F), Mild Pain (1 - 3)  aluminum hydroxide/magnesium hydroxide/simethicone Suspension 30 milliLiter(s) Oral every 4 hours PRN Dyspepsia  melatonin 3 milliGRAM(s) Oral at bedtime PRN Insomnia  ondansetron Injectable 4 milliGRAM(s) IV Push every 8 hours PRN Nausea and/or Vomiting      I&O's Summary      PHYSICAL EXAM:  Vital Signs Last 24 Hrs  T(C): 36.6 (02 Jun 2022 05:27), Max: 36.6 (02 Jun 2022 05:27)  T(F): 97.8 (02 Jun 2022 05:27), Max: 97.8 (02 Jun 2022 05:27)  HR: 92 (02 Jun 2022 05:27) (66 - 92)  BP: 152/79 (02 Jun 2022 05:27) (112/70 - 170/92)  BP(mean): --  RR: 20 (02 Jun 2022 05:27) (20 - 20)  SpO2: 96% (02 Jun 2022 05:27) (95% - 96%)    CONSTITUTIONAL: NAD, well-groomed  ENMT: Moist oral mucosa, no pharyngeal injection or exudates; normal dentition  RESPIRATORY: Normal respiratory effort; lungs are clear to auscultation bilaterally  CARDIOVASCULAR: Regular rate and rhythm, normal S1 and S2, no murmur/rub/gallop; No lower extremity edema; Peripheral pulses are 2+ bilaterally  ABDOMEN: Nontender to palpation, normoactive bowel sounds, no rebound/guarding; No hepatosplenomegaly  MUSCLOSKELETAL:  Normal gait; no clubbing or cyanosis of digits; no joint swelling or tenderness to palpation  PSYCH: A+O x1 (name); confused  NEUROLOGY: CN 2-12 are intact and symmetric; no gross sensory deficits;   SKIN: No rashes; no palpable lesions      LABS:                        14.7   9.55  )-----------( 145      ( 01 Jun 2022 06:16 )             44.6     06-01    140  |  105  |  29.8<H>  ----------------------------<  119<H>  4.1   |  23.0  |  1.07    Ca    7.8<L>      01 Jun 2022 06:16  Phos  2.4     06-01  Mg     2.0     06-01    TPro  5.7<L>  /  Alb  3.0<L>  /  TBili  0.3<L>  /  DBili  x   /  AST  13  /  ALT  9   /  AlkPhos  74  06-01              CAPILLARY BLOOD GLUCOSE          RADIOLOGY & ADDITIONAL TESTS:  Results Reviewed:   Imaging Personally Reviewed:  Electrocardiogram Personally Reviewed:
[facility]  INFECTIOUS DISEASES AND INTERNAL MEDICINE at [place]  =======================================================  Jack Hooper MD  Diplomates American Board of Internal Medicine and Infectious Diseases  Tel: [phone]      Fax: [phone]  =======================================================    DOMONIQUE MAST [id number]    Follow up: covid, uti  afebrile  stable on room air  no complaints      Allergies:  penicillin (Hives)           REVIEW OF SYSTEMS:  CONSTITUTIONAL:  No Fever or chills  HEENT:   No diplopia or blurred vision.  No earache, sore throat or runny nose.  CARDIOVASCULAR:  No pressure, squeezing, strangling, tightness, heaviness or aching about the chest, neck, axilla or epigastrium.  RESPIRATORY:  No cough, shortness of breath  GASTROINTESTINAL:  No nausea, vomiting or diarrhea.  GENITOURINARY:  No dysuria, frequency or urgency. No Blood in urine  MUSCULOSKELETAL:  no joint aches, no muscle pain  SKIN:  No change in skin, hair or nails.  NEUROLOGIC:  No Headaches, seizures or weakness.  PSYCHIATRIC:  No disorder of thought or mood.  ENDOCRINE:  No heat or cold intolerance  HEMATOLOGICAL:  No easy bruising or bleeding.       Physical Exam:  GEN: NAD, pleasant  HEENT: normocephalic and atraumatic. EOMI. PERRL.  Anicteric   NECK: Supple.   LUNGS: Clear to auscultation.  HEART: Regular rate and rhythm without murmur.  ABDOMEN: Soft, nontender, and nondistended.  Positive bowel sounds.    : No CVA tenderness  EXTREMITIES: Without any edema.  MSK: no joint swelling  NEUROLOGIC: Cranial nerves II through XII are grossly intact. No focal deficits  PSYCHIATRIC: Appropriate affect .  SKIN: No Rash      Vitals:    T(F): 97.2 (01 Jun 2022 05:06), Max: 98 (31 May 2022 17:22)  HR: 81 (01 Jun 2022 08:38)  BP: 165/89 (01 Jun 2022 08:38)  RR: 20 (01 Jun 2022 05:06)  SpO2: 95% (01 Jun 2022 05:06) (95% - 98%)  temp max in last 48H T(F): , Max: 98.3 (05-30-22 @ 15:00)    Current Antibiotics:  cefTRIAXone   IVPB 2000 milliGRAM(s) IV Intermittent every 24 hours  remdesivir  IVPB   IV Intermittent   remdesivir  IVPB 100 milliGRAM(s) IV Intermittent every 24 hours    Other medications:  atorvastatin 20 milliGRAM(s) Oral at bedtime  dexAMETHasone  Injectable 6 milliGRAM(s) IV Push daily  diltiazem    Tablet 60 milliGRAM(s) Oral four times a day  donepezil 10 milliGRAM(s) Oral at bedtime  heparin   Injectable 5000 Unit(s) SubCutaneous every 12 hours  metoprolol succinate ER 50 milliGRAM(s) Oral daily  mirtazapine 7.5 milliGRAM(s) Oral at bedtime  sodium chloride 0.9% lock flush 3 milliLiter(s) IV Push every 8 hours  traZODone 100 milliGRAM(s) Oral at bedtime  venlafaxine XR. 75 milliGRAM(s) Oral daily                            14.7   9.55  )-----------( 145      ( 01 Jun 2022 06:16 )             44.6     06-01    140  |  105  |  29.8<H>  ----------------------------<  119<H>  4.1   |  23.0  |  1.07    Ca    7.8<L>      01 Jun 2022 06:16  Phos  2.4     06-01  Mg     2.0     06-01    TPro  5.7<L>  /  Alb  3.0<L>  /  TBili  0.3<L>  /  DBili  x   /  AST  13  /  ALT  9   /  AlkPhos  74  06-01    RECENT CULTURES:  05-28 @ 10:45 .Blood Blood-Peripheral     No growth at 48 hours        05-28 @ 10:44 .Blood Blood-Peripheral     No growth at 48 hours        05-28 @ 05:26 Clean Catch Clean Catch (Midstream) Proteus mirabilis    >100,000 CFU/ml Proteus mirabilis        05-27 @ 22:20          Detected      WBC Count: 9.55 K/uL (06-01-22 @ 06:16)  WBC Count: 3.34 K/uL (05-31-22 @ 07:43)  WBC Count: 5.70 K/uL (05-29-22 @ 06:12)  WBC Count: 7.62 K/uL (05-27-22 @ 20:56)    Creatinine, Serum: 1.07 mg/dL (06-01-22 @ 06:16)  Creatinine, Serum: 0.94 mg/dL (05-31-22 @ 07:43)  Creatinine, Serum: 0.98 mg/dL (05-29-22 @ 06:12)  Creatinine, Serum: 1.00 mg/dL (05-27-22 @ 20:56)    C-Reactive Protein, Serum: 10 mg/L (05-31-22 @ 07:43)    Ferritin, Serum: 150 ng/mL (05-31-22 @ 07:43)    Sedimentation Rate, Erythrocyte: 32 mm/hr (05-31-22 @ 07:43)    Procalcitonin, Serum: 0.08 ng/mL (05-31-22 @ 07:43)  Procalcitonin, Serum: 0.07 ng/mL (05-31-22 @ 07:43)     SARS-CoV-2: Detected (05-27-22 @ 22:20)    < from: US Renal (05.30.22 @ 18:11) >  FINDINGS:  Right kidney: 10.4 cm. No suspicious renal mass, hydronephrosis or   calculi. There is 1 cm cyst in the lower pole. Increased echogenicity.    Left kidney: 11.8 cm. Normal in size without hydronephrosis. Increased   echogenicity. Right upper pole caliectasis.    Urinary bladder: Within normal limits.    IMPRESSION:  No hydronephrosis.    Increased renal echogenicity consistent with medical renal disease.        --- End of Report ---    < end of copied text >  
[facility]  INFECTIOUS DISEASES AND INTERNAL MEDICINE at [place]  =======================================================  Jack Hooper MD  Diplomates American Board of Internal Medicine and Infectious Diseases  Tel: [phone]      Fax: [phone]  =======================================================    DOMONIQUE MAST [id number]    Follow up: covid, uti  feels well  no complaints      Allergies:  penicillin (Hives)           REVIEW OF SYSTEMS:  CONSTITUTIONAL:  No Fever or chills  HEENT:   No diplopia or blurred vision.  No earache, sore throat or runny nose.  CARDIOVASCULAR:  No pressure, squeezing, strangling, tightness, heaviness or aching about the chest, neck, axilla or epigastrium.  RESPIRATORY:  No cough, shortness of breath  GASTROINTESTINAL:  No nausea, vomiting or diarrhea.  GENITOURINARY:  No dysuria, frequency or urgency. No Blood in urine  MUSCULOSKELETAL:  no joint aches, no muscle pain  SKIN:  No change in skin, hair or nails.  NEUROLOGIC:  No Headaches, seizures or weakness.  PSYCHIATRIC:  No disorder of thought or mood.  ENDOCRINE:  No heat or cold intolerance  HEMATOLOGICAL:  No easy bruising or bleeding.       Physical Exam:  GEN: NAD, pleasant  HEENT: normocephalic and atraumatic. EOMI. PERRL.  Anicteric   NECK: Supple.   LUNGS: Clear to auscultation.  HEART: Regular rate and rhythm without murmur.  ABDOMEN: Soft, nontender, and nondistended.  Positive bowel sounds.    : No CVA tenderness  EXTREMITIES: Without any edema.  MSK: no joint swelling  NEUROLOGIC: Cranial nerves II through XII are grossly intact. No focal deficits  PSYCHIATRIC: Appropriate affect .  SKIN: No Rash      Vitals:    T(F): 98 (31 May 2022 17:22), Max: 98 (31 May 2022 17:22)  HR: 80 (31 May 2022 17:22)  BP: 150/70 (31 May 2022 17:22)  RR: 19 (31 May 2022 17:22)  SpO2: 98% (31 May 2022 17:22) (92% - 100%)  temp max in last 48H T(F): , Max: 98.3 (05-30-22 @ 14:36)    Current Antibiotics:  cefTRIAXone   IVPB 2000 milliGRAM(s) IV Intermittent every 24 hours  remdesivir  IVPB 100 milliGRAM(s) IV Intermittent every 24 hours  remdesivir  IVPB   IV Intermittent     Other medications:  atorvastatin 20 milliGRAM(s) Oral at bedtime  dexAMETHasone  Injectable 6 milliGRAM(s) IV Push daily  diltiazem    Tablet 60 milliGRAM(s) Oral four times a day  donepezil 10 milliGRAM(s) Oral at bedtime  enoxaparin Injectable 85 milliGRAM(s) SubCutaneous every 12 hours  metoprolol succinate ER 50 milliGRAM(s) Oral daily  mirtazapine 7.5 milliGRAM(s) Oral at bedtime  sodium chloride 0.9% lock flush 3 milliLiter(s) IV Push every 8 hours  traZODone 100 milliGRAM(s) Oral at bedtime  venlafaxine XR. 75 milliGRAM(s) Oral daily                            13.8   3.34  )-----------( 128      ( 31 May 2022 07:43 )             41.0     05-31    140  |  105  |  21.7<H>  ----------------------------<  142<H>  4.6   |  24.0  |  0.94    Ca    7.9<L>      31 May 2022 07:43  Phos  2.3     05-31  Mg     1.9     05-31    TPro  5.8<L>  /  Alb  2.8<L>  /  TBili  0.3<L>  /  DBili  0.1  /  AST  14  /  ALT  8   /  AlkPhos  67  05-31    RECENT CULTURES:  05-28 @ 10:45 .Blood Blood-Peripheral     No growth at 48 hours        05-28 @ 10:44 .Blood Blood-Peripheral     No growth at 48 hours        05-28 @ 05:26 Clean Catch Clean Catch (Midstream) Proteus mirabilis    >100,000 CFU/ml Proteus mirabilis        05-27 @ 22:20          Detected      WBC Count: 3.34 K/uL (05-31-22 @ 07:43)  WBC Count: 5.70 K/uL (05-29-22 @ 06:12)  WBC Count: 7.62 K/uL (05-27-22 @ 20:56)    Creatinine, Serum: 0.94 mg/dL (05-31-22 @ 07:43)  Creatinine, Serum: 0.98 mg/dL (05-29-22 @ 06:12)  Creatinine, Serum: 1.00 mg/dL (05-27-22 @ 20:56)    C-Reactive Protein, Serum: 10 mg/L (05-31-22 @ 07:43)    Ferritin, Serum: 150 ng/mL (05-31-22 @ 07:43)    Sedimentation Rate, Erythrocyte: 32 mm/hr (05-31-22 @ 07:43)    Procalcitonin, Serum: 0.08 ng/mL (05-31-22 @ 07:43)  Procalcitonin, Serum: 0.07 ng/mL (05-31-22 @ 07:43)     SARS-CoV-2: Detected (05-27-22 @ 22:20)    < from: US Renal (05.30.22 @ 18:11) >  FINDINGS:  Right kidney: 10.4 cm. No suspicious renal mass, hydronephrosis or   calculi. There is 1 cm cyst in the lower pole. Increased echogenicity.    Left kidney: 11.8 cm. Normal in size without hydronephrosis. Increased   echogenicity. Right upper pole caliectasis.    Urinary bladder: Within normal limits.    IMPRESSION:  No hydronephrosis.    Increased renal echogenicity consistent with medical renal disease.        < end of copied text >    
Fito Bianchi M.D.    Patient is a 88y old  Male who presents with a chief complaint of UTI  COVID  Metabolic Encephalopathy (01 Jun 2022 07:44)      SUBJECTIVE / OVERNIGHT EVENTS: no event overnight. Pleasantly confused.     MEDICATIONS  (STANDING):  atorvastatin 20 milliGRAM(s) Oral at bedtime  cefTRIAXone   IVPB 2000 milliGRAM(s) IV Intermittent every 24 hours  dexAMETHasone  Injectable 6 milliGRAM(s) IV Push daily  diltiazem    Tablet 60 milliGRAM(s) Oral four times a day  donepezil 10 milliGRAM(s) Oral at bedtime  heparin   Injectable 5000 Unit(s) SubCutaneous every 12 hours  metoprolol succinate ER 50 milliGRAM(s) Oral daily  mirtazapine 7.5 milliGRAM(s) Oral at bedtime  remdesivir  IVPB   IV Intermittent   remdesivir  IVPB 100 milliGRAM(s) IV Intermittent every 24 hours  sodium chloride 0.9% lock flush 3 milliLiter(s) IV Push every 8 hours  traZODone 100 milliGRAM(s) Oral at bedtime  venlafaxine XR. 75 milliGRAM(s) Oral daily    MEDICATIONS  (PRN):  acetaminophen     Tablet .. 650 milliGRAM(s) Oral every 6 hours PRN Temp greater or equal to 38C (100.4F), Mild Pain (1 - 3)  aluminum hydroxide/magnesium hydroxide/simethicone Suspension 30 milliLiter(s) Oral every 4 hours PRN Dyspepsia  melatonin 3 milliGRAM(s) Oral at bedtime PRN Insomnia  ondansetron Injectable 4 milliGRAM(s) IV Push every 8 hours PRN Nausea and/or Vomiting      I&O's Summary      PHYSICAL EXAM:  Vital Signs Last 24 Hrs  T(C): 36.2 (01 Jun 2022 05:06), Max: 36.7 (31 May 2022 17:22)  T(F): 97.2 (01 Jun 2022 05:06), Max: 98 (31 May 2022 17:22)  HR: 81 (01 Jun 2022 08:38) (58 - 81)  BP: 165/89 (01 Jun 2022 08:38) (136/83 - 165/89)  BP(mean): --  RR: 20 (01 Jun 2022 05:06) (19 - 20)  SpO2: 95% (01 Jun 2022 05:06) (95% - 100%)    CONSTITUTIONAL: NAD, well-groomed  ENMT: Moist oral mucosa, no pharyngeal injection or exudates; normal dentition  RESPIRATORY: Normal respiratory effort; lungs are clear to auscultation bilaterally  CARDIOVASCULAR: Regular rate and rhythm, normal S1 and S2, no murmur/rub/gallop; No lower extremity edema; Peripheral pulses are 2+ bilaterally  ABDOMEN: Nontender to palpation, normoactive bowel sounds, no rebound/guarding; No hepatosplenomegaly  MUSCLOSKELETAL:  Normal gait; no clubbing or cyanosis of digits; no joint swelling or tenderness to palpation  PSYCH: A+O x1 (name); confused  NEUROLOGY: CN 2-12 are intact and symmetric; no gross sensory deficits;   SKIN: No rashes; no palpable lesions    LABS:                        14.7   9.55  )-----------( 145      ( 01 Jun 2022 06:16 )             44.6     06-01    140  |  105  |  29.8<H>  ----------------------------<  119<H>  4.1   |  23.0  |  1.07    Ca    7.8<L>      01 Jun 2022 06:16  Phos  2.4     06-01  Mg     2.0     06-01    TPro  5.7<L>  /  Alb  3.0<L>  /  TBili  0.3<L>  /  DBili  x   /  AST  13  /  ALT  9   /  AlkPhos  74  06-01              CAPILLARY BLOOD GLUCOSE          RADIOLOGY & ADDITIONAL TESTS:  Results Reviewed:   Imaging Personally Reviewed:  Electrocardiogram Personally Reviewed:
Fito Bianchi M.D.    Patient is a 88y old  Male who presents with a chief complaint of UTI  COVID  Metabolic Encephalopathy (28 May 2022 17:14)      SUBJECTIVE / OVERNIGHT EVENTS: no concerns. Slept great overnight.     Patient denies chest pain, SOB, abd pain, N/V, fever, chills, dysuria or any other complaints. All remainder ROS negative.     MEDICATIONS  (STANDING):  atorvastatin 20 milliGRAM(s) Oral at bedtime  cefTRIAXone   IVPB 2000 milliGRAM(s) IV Intermittent every 24 hours  diltiazem    Tablet 60 milliGRAM(s) Oral four times a day  donepezil 10 milliGRAM(s) Oral at bedtime  heparin   Injectable 5000 Unit(s) SubCutaneous every 12 hours  metoprolol succinate ER 50 milliGRAM(s) Oral daily  mirtazapine 7.5 milliGRAM(s) Oral at bedtime  predniSONE   Tablet 5 milliGRAM(s) Oral daily  sodium chloride 0.9% lock flush 3 milliLiter(s) IV Push every 8 hours  traZODone 100 milliGRAM(s) Oral at bedtime  venlafaxine XR. 75 milliGRAM(s) Oral daily    MEDICATIONS  (PRN):  acetaminophen     Tablet .. 650 milliGRAM(s) Oral every 6 hours PRN Temp greater or equal to 38C (100.4F), Mild Pain (1 - 3)  aluminum hydroxide/magnesium hydroxide/simethicone Suspension 30 milliLiter(s) Oral every 4 hours PRN Dyspepsia  melatonin 3 milliGRAM(s) Oral at bedtime PRN Insomnia  ondansetron Injectable 4 milliGRAM(s) IV Push every 8 hours PRN Nausea and/or Vomiting      I&O's Summary    28 May 2022 07:01  -  29 May 2022 07:00  --------------------------------------------------------  IN: 100 mL / OUT: 0 mL / NET: 100 mL        PHYSICAL EXAM:  Vital Signs Last 24 Hrs  T(C): 37.2 (29 May 2022 05:01), Max: 37.2 (29 May 2022 05:01)  T(F): 99 (29 May 2022 05:01), Max: 99 (29 May 2022 05:01)  HR: 68 (29 May 2022 05:01) (68 - 79)  BP: 138/77 (29 May 2022 05:01) (138/77 - 178/80)  BP(mean): --  RR: 18 (29 May 2022 05:01) (16 - 18)  SpO2: 98% (29 May 2022 05:01) (95% - 100%)    CONSTITUTIONAL: NAD, well-groomed  ENMT: Moist oral mucosa, no pharyngeal injection or exudates; normal dentition  RESPIRATORY: Normal respiratory effort; lungs are clear to auscultation bilaterally  CARDIOVASCULAR: Regular rate and rhythm, normal S1 and S2, no murmur/rub/gallop; No lower extremity edema; Peripheral pulses are 2+ bilaterally  ABDOMEN: Nontender to palpation, normoactive bowel sounds, no rebound/guarding; No hepatosplenomegaly  MUSCLOSKELETAL:  Normal gait; no clubbing or cyanosis of digits; no joint swelling or tenderness to palpation  PSYCH: A+O x1; calm and affect appropriate  NEUROLOGY: CN 2-12 are intact and symmetric; no gross sensory deficits;   SKIN: No rashes; no palpable lesions    LABS:                        14.6   5.70  )-----------( 150      ( 29 May 2022 06:12 )             45.0         143  |  106  |  18.8  ----------------------------<  82  3.9   |  21.0<L>  |  0.98    Ca    8.0<L>      29 May 2022 06:12    TPro  5.6<L>  /  Alb  2.7<L>  /  TBili  0.6  /  DBili  x   /  AST  14  /  ALT  8   /  AlkPhos  74  05-27      CARDIAC MARKERS ( 27 May 2022 20:56 )  x     / <0.01 ng/mL / 56 U/L / x     / x          Urinalysis Basic - ( 28 May 2022 01:25 )    Color: Yellow / Appearance: very cloudy / S.015 / pH: x  Gluc: x / Ketone: Small  / Bili: Negative / Urobili: Negative   Blood: x / Protein: Negative / Nitrite: Positive   Leuk Esterase: Moderate / RBC: 6-10 /HPF / WBC 26-50 /HPF   Sq Epi: x / Non Sq Epi: Occasional / Bacteria: Moderate        CAPILLARY BLOOD GLUCOSE          RADIOLOGY & ADDITIONAL TESTS:  Results Reviewed:   Imaging Personally Reviewed:  Electrocardiogram Personally Reviewed:

## 2022-06-02 NOTE — DIETITIAN INITIAL EVALUATION ADULT - OTHER INFO
89 y/o male with reported Dementia sent from [place] for increased confusion, diarrhea, and positive covid test. Pt unable to provide any meaningful history, no paperwork or phone numbers available to speak to family for collateral information. Per chart review patient has a history of dementia HLD, prostate cancer, and HTN. Found to be COVID pos. U/A grossly positive. Pt cultured, given IV abx and IVF.

## 2022-06-02 NOTE — DIETITIAN INITIAL EVALUATION ADULT - ORAL INTAKE PTA/DIET HISTORY
Pt noted with poor PO intake 2/2 not feeling hungry. Pt unaware of UBW, bedscale weight consistent with admission weight, states height 74". Pt accepting of OJ when offered.

## 2022-06-02 NOTE — DIETITIAN INITIAL EVALUATION ADULT - ADD RECOMMEND
Add Ensure Enlive TID; Rx MVI and vit D supplementation daily; Encourage HBV protein sources; Provide encouragement/assistance as needed during mealtimes to inc PO

## 2022-06-02 NOTE — DISCHARGE NOTE NURSING/CASE MANAGEMENT/SOCIAL WORK - NSDCPEFALRISK_GEN_ALL_CORE
For information on Fall & Injury Prevention, visit: [email or web address] OR  [email or web address] OR  https://www.cdc.gov/steadi/patient.html

## 2022-06-02 NOTE — DIETITIAN INITIAL EVALUATION ADULT - PERTINENT LABORATORY DATA
06-01    140  |  105  |  29.8<H>  ----------------------------<  119<H>  4.1   |  23.0  |  1.07    Ca    7.8<L>      01 Jun 2022 06:16  Phos  2.4     06-01  Mg     2.0     06-01    TPro  5.7<L>  /  Alb  3.0<L>  /  TBili  0.3<L>  /  DBili  x   /  AST  13  /  ALT  9   /  AlkPhos  74  06-01

## 2022-06-02 NOTE — DIETITIAN INITIAL EVALUATION ADULT - ETIOLOGY
related to inadequate protein-energy intake in setting of COVID PNA, dementia, metabolic encephalopathy 2/2 UTI

## 2022-06-02 NOTE — DIETITIAN NUTRITION RISK NOTIFICATION - ADDITIONAL COMMENTS/DIETITIAN RECOMMENDATIONS
1) Add Ensure Enlive TID   2) Rx MVI and vit D supplementation daily  3) Encourage HBV protein sources  4) Provide encouragement/assistance as needed during mealtimes to inc PO   5) Monitor weights daily for trend/accuracy

## 2022-06-02 NOTE — PROGRESS NOTE ADULT - ASSESSMENT
89 y/o male with Hx of MDD, Dementia, HLD, HTN, prostate cancer presents with reported Dementia sent from [place] for increased confusion, diarrhea, and positive covid test. Admitted with UTI, COVID, Metabolic encephalopathy.     #COVID 19  - s/p vaccination and booster, diagnosed 5/27  #Acute hypoxia reaspiratory failure - worsening O2, 90% on RA  -CXR w/o infiltrates  -c/w Isolation  -c/w Supportive care  -ID consulted, harsh recs  -d/w ID, will start Remdesivir and Decadron x 3 days  -chest PT  -D-dimer > 3000, LE doppler neg for DVT, patient non hypoxic. Pro  and negative trop. Will dc with Xarelto 10mg x 30 days    #UTI  #Metabolic encephalopathy   -c/w Rocephin   -UCx w/ proteus, CTX sensitive, will plan for 10 day therapy with Vantin   -Bladder scan post void   -No flank pain, fever, leukocytosis  -OOB, ambulate     #Dementia  -Fall risk  -restart Donepezil, Mirtazapine, Trazodone, Venlafaxine   -obtain bedside swallow, will resume diet afterwards  -aspiration precaution     #HLD  #HTN  -c/w Metoprolol and Cardizem   -DASH diet    #Prostate cancer  #Overactive bladder   -takes Gemtesa at home     DVT ppx: Heparin SQ   Diet: Regular  Dispo: back to [place], pending acceptance

## 2022-06-02 NOTE — PROGRESS NOTE ADULT - PROVIDER SPECIALTY LIST ADULT
Hospitalist
Infectious Disease
Hospitalist
Hospitalist
Infectious Disease
Hospitalist

## 2022-06-02 NOTE — PROGRESS NOTE ADULT - REASON FOR ADMISSION
UTI  COVID  Metabolic Encephalopathy

## 2022-06-02 NOTE — DIETITIAN INITIAL EVALUATION ADULT - PERTINENT MEDS FT
MEDICATIONS  (STANDING):  atorvastatin 20 milliGRAM(s) Oral at bedtime  cefTRIAXone   IVPB 2000 milliGRAM(s) IV Intermittent every 24 hours  dexAMETHasone  Injectable 6 milliGRAM(s) IV Push daily  diltiazem    Tablet 60 milliGRAM(s) Oral four times a day  donepezil 10 milliGRAM(s) Oral at bedtime  metoprolol succinate ER 50 milliGRAM(s) Oral daily  mirtazapine 7.5 milliGRAM(s) Oral at bedtime  remdesivir  IVPB 100 milliGRAM(s) IV Intermittent every 24 hours  remdesivir  IVPB   IV Intermittent   rivaroxaban 10 milliGRAM(s) Oral daily  sodium chloride 0.9% lock flush 3 milliLiter(s) IV Push every 8 hours  traZODone 100 milliGRAM(s) Oral at bedtime  venlafaxine XR. 75 milliGRAM(s) Oral daily    MEDICATIONS  (PRN):  acetaminophen     Tablet .. 650 milliGRAM(s) Oral every 6 hours PRN Temp greater or equal to 38C (100.4F), Mild Pain (1 - 3)  aluminum hydroxide/magnesium hydroxide/simethicone Suspension 30 milliLiter(s) Oral every 4 hours PRN Dyspepsia  melatonin 3 milliGRAM(s) Oral at bedtime PRN Insomnia  ondansetron Injectable 4 milliGRAM(s) IV Push every 8 hours PRN Nausea and/or Vomiting

## 2022-06-03 ENCOUNTER — TRANSCRIPTION ENCOUNTER (OUTPATIENT)
Age: 87
End: 2022-06-03

## 2022-06-11 ENCOUNTER — EMERGENCY (EMERGENCY)
Facility: HOSPITAL | Age: 87
LOS: 1 days | Discharge: HOME | End: 2022-06-11
Attending: EMERGENCY MEDICINE
Payer: MEDICARE

## 2022-06-11 VITALS
SYSTOLIC BLOOD PRESSURE: 142 MMHG | DIASTOLIC BLOOD PRESSURE: 75 MMHG | RESPIRATION RATE: 20 BRPM | HEART RATE: 70 BPM | TEMPERATURE: 98 F | OXYGEN SATURATION: 98 %

## 2022-06-11 LAB
ALBUMIN SERPL ELPH-MCNC: 3 G/DL — LOW (ref 3.3–5.2)
ALP SERPL-CCNC: 85 U/L — SIGNIFICANT CHANGE UP (ref 40–120)
ALT FLD-CCNC: 13 U/L — SIGNIFICANT CHANGE UP
ANION GAP SERPL CALC-SCNC: 12 MMOL/L — SIGNIFICANT CHANGE UP (ref 5–17)
APPEARANCE UR: CLEAR — SIGNIFICANT CHANGE UP
AST SERPL-CCNC: 17 U/L — SIGNIFICANT CHANGE UP
BASOPHILS # BLD AUTO: 0.03 K/UL — SIGNIFICANT CHANGE UP (ref 0–0.2)
BASOPHILS NFR BLD AUTO: 0.2 % — SIGNIFICANT CHANGE UP (ref 0–2)
BILIRUB SERPL-MCNC: 0.8 MG/DL — SIGNIFICANT CHANGE UP (ref 0.4–2)
BILIRUB UR-MCNC: NEGATIVE — SIGNIFICANT CHANGE UP
BUN SERPL-MCNC: 21.5 MG/DL — HIGH (ref 8–20)
CALCIUM SERPL-MCNC: 8.3 MG/DL — LOW (ref 8.6–10.2)
CHLORIDE SERPL-SCNC: 105 MMOL/L — SIGNIFICANT CHANGE UP (ref 98–107)
CO2 SERPL-SCNC: 22 MMOL/L — SIGNIFICANT CHANGE UP (ref 22–29)
COLOR SPEC: YELLOW — SIGNIFICANT CHANGE UP
CREAT SERPL-MCNC: 1.17 MG/DL — SIGNIFICANT CHANGE UP (ref 0.5–1.3)
DIFF PNL FLD: ABNORMAL
EGFR: 60 ML/MIN/1.73M2 — SIGNIFICANT CHANGE UP
EOSINOPHIL # BLD AUTO: 0.09 K/UL — SIGNIFICANT CHANGE UP (ref 0–0.5)
EOSINOPHIL NFR BLD AUTO: 0.7 % — SIGNIFICANT CHANGE UP (ref 0–6)
GLUCOSE SERPL-MCNC: 85 MG/DL — SIGNIFICANT CHANGE UP (ref 70–99)
GLUCOSE UR QL: NEGATIVE MG/DL — SIGNIFICANT CHANGE UP
HCT VFR BLD CALC: 44.2 % — SIGNIFICANT CHANGE UP (ref 39–50)
HGB BLD-MCNC: 14.7 G/DL — SIGNIFICANT CHANGE UP (ref 13–17)
IMM GRANULOCYTES NFR BLD AUTO: 0.5 % — SIGNIFICANT CHANGE UP (ref 0–1.5)
KETONES UR-MCNC: NEGATIVE — SIGNIFICANT CHANGE UP
LEUKOCYTE ESTERASE UR-ACNC: NEGATIVE — SIGNIFICANT CHANGE UP
LYMPHOCYTES # BLD AUTO: 1.01 K/UL — SIGNIFICANT CHANGE UP (ref 1–3.3)
LYMPHOCYTES # BLD AUTO: 7.7 % — LOW (ref 13–44)
MCHC RBC-ENTMCNC: 31.5 PG — SIGNIFICANT CHANGE UP (ref 27–34)
MCHC RBC-ENTMCNC: 33.3 GM/DL — SIGNIFICANT CHANGE UP (ref 32–36)
MCV RBC AUTO: 94.6 FL — SIGNIFICANT CHANGE UP (ref 80–100)
MONOCYTES # BLD AUTO: 1.3 K/UL — HIGH (ref 0–0.9)
MONOCYTES NFR BLD AUTO: 9.9 % — SIGNIFICANT CHANGE UP (ref 2–14)
NEUTROPHILS # BLD AUTO: 10.6 K/UL — HIGH (ref 1.8–7.4)
NEUTROPHILS NFR BLD AUTO: 81 % — HIGH (ref 43–77)
NITRITE UR-MCNC: NEGATIVE — SIGNIFICANT CHANGE UP
PH UR: 6.5 — SIGNIFICANT CHANGE UP (ref 5–8)
PLATELET # BLD AUTO: 167 K/UL — SIGNIFICANT CHANGE UP (ref 150–400)
POTASSIUM SERPL-MCNC: 4.2 MMOL/L — SIGNIFICANT CHANGE UP (ref 3.5–5.3)
POTASSIUM SERPL-SCNC: 4.2 MMOL/L — SIGNIFICANT CHANGE UP (ref 3.5–5.3)
PROT SERPL-MCNC: 5.8 G/DL — LOW (ref 6.6–8.7)
PROT UR-MCNC: NEGATIVE — SIGNIFICANT CHANGE UP
RBC # BLD: 4.67 M/UL — SIGNIFICANT CHANGE UP (ref 4.2–5.8)
RBC # FLD: 15.1 % — HIGH (ref 10.3–14.5)
SODIUM SERPL-SCNC: 139 MMOL/L — SIGNIFICANT CHANGE UP (ref 135–145)
SP GR SPEC: 1.02 — SIGNIFICANT CHANGE UP (ref 1.01–1.02)
TROPONIN T SERPL-MCNC: <0.01 NG/ML — SIGNIFICANT CHANGE UP (ref 0–0.06)
UROBILINOGEN FLD QL: 1 MG/DL
WBC # BLD: 13.09 K/UL — HIGH (ref 3.8–10.5)
WBC # FLD AUTO: 13.09 K/UL — HIGH (ref 3.8–10.5)

## 2022-06-11 PROCEDURE — 36000 PLACE NEEDLE IN VEIN: CPT | Mod: GC

## 2022-06-11 PROCEDURE — 99285 EMERGENCY DEPT VISIT HI MDM: CPT | Mod: CS,25,GC

## 2022-06-11 PROCEDURE — 93010 ELECTROCARDIOGRAM REPORT: CPT

## 2022-06-11 PROCEDURE — 76937 US GUIDE VASCULAR ACCESS: CPT | Mod: 26,GC

## 2022-06-11 NOTE — ED PROVIDER NOTE - PHYSICAL EXAMINATION
General: Well appearing male in no acute distress  HEENT: Normocephalic, atraumatic. Moist mucous membranes. Oropharynx clear. No lymphadenopathy.  Eyes: No scleral icterus. EOMI. CYNDI.  Neck:. Soft and supple. Full ROM without pain. No midline tenderness  Cardiac: Regular rate and regular rhythm. No murmurs, rubs, gallops. Peripheral pulses 2+ and symmetric. No LE edema.  Resp: Lungs CTAB. Speaking in full sentences. No wheezes, rales or rhonchi.  Abd: Soft, non-tender, non-distended. No guarding or rebound. No scars, masses, or lesions.  Back: Spine midline and non-tender. No CVA tenderness.    Skin: skin avulsion to LUE, covered w/ bandage, does not appear to be deep.   Neuro: AO x 2. Moves all extremities symmetrically. Motor strength and sensation grossly intact.

## 2022-06-11 NOTE — ED PROVIDER NOTE - PROGRESS NOTE DETAILS
Given the significant and immediate threats to this patient based on initial presentation, the benefits of emergency contrast-enhanced CT imaging without obtaining GFR/creatinine serum level results greatly outweigh the potential risk of harm due to contrast-induced nephropathy. history of dementia, unable to provide consent, requires CT scan to rule out life threatening condition. spoke to Jennifer from facility, informed we will be sending patient back and that abx will be sent to the pharmacy - [facility].

## 2022-06-11 NOTE — ED PROVIDER NOTE - WR ORDER DATE AND TIME 1
Telephone Encounter by Marsha Luna 11-Jun-2022 21:54

## 2022-06-11 NOTE — ED PROVIDER NOTE - NSFOLLOWUPINSTRUCTIONS_ED_ALL_ED_FT
Take antibiotics until completion. followup with primary care doctor in next 1-10 days.     Pneumonia    Pneumonia is an infection of the lungs. Pneumonia may be caused by bacteria, viruses, or funguses. Symptoms include coughing, fever, chest pain when breathing deeply or coughing, shortness of breath, fatigue, or muscle aches. Pneumonia can be diagnosed with a medical history and physical exam, as well as other tests which may include a chest X-ray. If you were prescribed an antibiotic medicine, take it as told by your health care provider and do not stop taking the antibiotic even if you start to feel better. Do not use tobacco products, including cigarettes, chewing tobacco, and e-cigarettes.    SEEK IMMEDIATE MEDICAL CARE IF YOU HAVE ANY OF THE FOLLOWING SYMPTOMS: worsening shortness of breath, worsening chest pain, coughing up blood, change in mental status, lightheadedness/dizziness.

## 2022-06-11 NOTE — ED PROVIDER NOTE - CLINICAL SUMMARY MEDICAL DECISION MAKING FREE TEXT BOX
88 y/o M hx of recent admission for UTI/COVID, afib, on xarelto, sent in from [facility] for AMS. per staff member karyn at facility, states that he was more lethargic today and short of breath w/ AMS.   ct head given possible AMS,currently A&Ox2, baseline is A&Ox1.   UA/UC - r/o UTI given recent proteus UTI. labs, ekg, cxr. vital signs stable on arrival, afebrile, not in resp. distress, appears comfortable w/ no complaints.

## 2022-06-11 NOTE — ED PROVIDER NOTE - PATIENT PORTAL LINK FT
You can access the FollowMyHealth Patient Portal offered by [facility] by registering at the following website: [email or web address]. By joining [facility]’s FollowMyHealth portal, you will also be able to view your health information using other applications (apps) compatible with our system.

## 2022-06-11 NOTE — ED ADULT NURSE NOTE - OBJECTIVE STATEMENT
pt TIMMY&Salo and MCKAYLA webb historian states that he came from [facility] which he really came from [place]. pt currently denies pain and states he usually walks around with a walker. pt updated on plan of care awaiting MD order.

## 2022-06-11 NOTE — ED ADULT TRIAGE NOTE - CHIEF COMPLAINT QUOTE
Sent in from the [facility] for AMS since Wednesday.  PT was discharged from here for a UTI and COVID diagnosed last week.  As per facility since returning on WEdnesday pt has not been acting himself.  GCS14 at this time. Offers no complaints. .

## 2022-06-11 NOTE — ED ADULT NURSE NOTE - CHIEF COMPLAINT QUOTE
Sent in from the [facility] for AMS since Wednesday.  PT was discharged from here for a UTI and COVID diagnosed last week.  As per facility since returning on WEdnesday pt has not been acting himself.  GCS14 at this time. Offers no complaints. .

## 2022-06-11 NOTE — ED PROVIDER NOTE - ATTENDING CONTRIBUTION TO CARE
Evidence of pneumonia on CTA but has stable VS with no hypoxia and appears at baseline mental status. Will start levofloxacin for PNA treatment, care plan discussed with [facility] staff and pt able to be discharged with return precautions.

## 2022-06-11 NOTE — ED PROVIDER NOTE - OBJECTIVE STATEMENT
89 y/o M hx of recent admission for UTI/COVID, afib, on xarelto, sent in from [facility] for AMS. per staff member karyn at facility, states that he was more lethargic today and short of breath w/ AMS. states his baseline is A&Ox1. no trauma per staff. patient not endorsing any acute complaints. did state he cut his forearm on a piece of glass several days ago.

## 2022-06-12 VITALS
SYSTOLIC BLOOD PRESSURE: 111 MMHG | DIASTOLIC BLOOD PRESSURE: 69 MMHG | OXYGEN SATURATION: 98 % | TEMPERATURE: 98 F | HEART RATE: 73 BPM | RESPIRATION RATE: 18 BRPM

## 2022-06-12 PROBLEM — C61 MALIGNANT NEOPLASM OF PROSTATE: Chronic | Status: ACTIVE | Noted: 2022-05-28

## 2022-06-12 PROBLEM — E78.5 HYPERLIPIDEMIA, UNSPECIFIED: Chronic | Status: ACTIVE | Noted: 2022-05-28

## 2022-06-12 PROBLEM — I10 ESSENTIAL (PRIMARY) HYPERTENSION: Chronic | Status: ACTIVE | Noted: 2022-05-28

## 2022-06-12 PROBLEM — F03.90 UNSPECIFIED DEMENTIA WITHOUT BEHAVIORAL DISTURBANCE: Chronic | Status: ACTIVE | Noted: 2022-05-28

## 2022-06-12 LAB
B PERT DNA SPEC QL NAA+PROBE: SIGNIFICANT CHANGE UP
C PNEUM DNA SPEC QL NAA+PROBE: SIGNIFICANT CHANGE UP
EPI CELLS # UR: SIGNIFICANT CHANGE UP
FLUAV H1 2009 PAND RNA SPEC QL NAA+PROBE: SIGNIFICANT CHANGE UP
FLUAV H1 RNA SPEC QL NAA+PROBE: SIGNIFICANT CHANGE UP
FLUAV H3 RNA SPEC QL NAA+PROBE: SIGNIFICANT CHANGE UP
FLUAV SUBTYP SPEC NAA+PROBE: SIGNIFICANT CHANGE UP
FLUBV RNA SPEC QL NAA+PROBE: SIGNIFICANT CHANGE UP
HADV DNA SPEC QL NAA+PROBE: SIGNIFICANT CHANGE UP
HCOV PNL SPEC NAA+PROBE: SIGNIFICANT CHANGE UP
HMPV RNA SPEC QL NAA+PROBE: SIGNIFICANT CHANGE UP
HPIV1 RNA SPEC QL NAA+PROBE: SIGNIFICANT CHANGE UP
HPIV2 RNA SPEC QL NAA+PROBE: SIGNIFICANT CHANGE UP
HPIV3 RNA SPEC QL NAA+PROBE: SIGNIFICANT CHANGE UP
HPIV4 RNA SPEC QL NAA+PROBE: SIGNIFICANT CHANGE UP
RAPID RVP RESULT: DETECTED
RBC CASTS # UR COMP ASSIST: ABNORMAL /HPF (ref 0–4)
RV+EV RNA SPEC QL NAA+PROBE: SIGNIFICANT CHANGE UP
SARS-COV-2 RNA SPEC QL NAA+PROBE: DETECTED
WBC UR QL: SIGNIFICANT CHANGE UP /HPF (ref 0–5)

## 2022-06-12 PROCEDURE — 71045 X-RAY EXAM CHEST 1 VIEW: CPT | Mod: 26

## 2022-06-12 PROCEDURE — 70450 CT HEAD/BRAIN W/O DYE: CPT | Mod: 26,MG

## 2022-06-12 PROCEDURE — 71275 CT ANGIOGRAPHY CHEST: CPT | Mod: 26,ME

## 2022-06-12 PROCEDURE — G1004: CPT

## 2022-06-12 RX ORDER — CIPROFLOXACIN LACTATE 400MG/40ML
1 VIAL (ML) INTRAVENOUS
Qty: 5 | Refills: 0
Start: 2022-06-12 | End: 2022-06-16

## 2022-06-14 ENCOUNTER — INPATIENT (INPATIENT)
Facility: HOSPITAL | Age: 87
LOS: 7 days | Discharge: NURSING, REHAB OR LONG-TERM CARE | DRG: 177 | End: 2022-06-22
Attending: FAMILY MEDICINE | Admitting: STUDENT IN AN ORGANIZED HEALTH CARE EDUCATION/TRAINING PROGRAM
Payer: MEDICARE

## 2022-06-14 VITALS
RESPIRATION RATE: 18 BRPM | SYSTOLIC BLOOD PRESSURE: 131 MMHG | OXYGEN SATURATION: 100 % | TEMPERATURE: 98 F | DIASTOLIC BLOOD PRESSURE: 76 MMHG | HEART RATE: 71 BPM

## 2022-06-14 DIAGNOSIS — J18.9 PNEUMONIA, UNSPECIFIED ORGANISM: ICD-10-CM

## 2022-06-14 LAB
-  AMIKACIN: SIGNIFICANT CHANGE UP
-  AZTREONAM: SIGNIFICANT CHANGE UP
-  CEFEPIME: SIGNIFICANT CHANGE UP
-  CEFTAZIDIME: SIGNIFICANT CHANGE UP
-  CIPROFLOXACIN: SIGNIFICANT CHANGE UP
-  GENTAMICIN: SIGNIFICANT CHANGE UP
-  IMIPENEM: SIGNIFICANT CHANGE UP
-  LEVOFLOXACIN: SIGNIFICANT CHANGE UP
-  MEROPENEM: SIGNIFICANT CHANGE UP
-  PIPERACILLIN/TAZOBACTAM: SIGNIFICANT CHANGE UP
-  TOBRAMYCIN: SIGNIFICANT CHANGE UP
ACANTHOCYTES BLD QL SMEAR: SLIGHT — SIGNIFICANT CHANGE UP
ALBUMIN SERPL ELPH-MCNC: 2.8 G/DL — LOW (ref 3.3–5.2)
ALP SERPL-CCNC: 83 U/L — SIGNIFICANT CHANGE UP (ref 40–120)
ALT FLD-CCNC: 11 U/L — SIGNIFICANT CHANGE UP
ANION GAP SERPL CALC-SCNC: 12 MMOL/L — SIGNIFICANT CHANGE UP (ref 5–17)
ANISOCYTOSIS BLD QL: SLIGHT — SIGNIFICANT CHANGE UP
AST SERPL-CCNC: 14 U/L — SIGNIFICANT CHANGE UP
BASOPHILS # BLD AUTO: 0.09 K/UL — SIGNIFICANT CHANGE UP (ref 0–0.2)
BASOPHILS NFR BLD AUTO: 0.9 % — SIGNIFICANT CHANGE UP (ref 0–2)
BILIRUB SERPL-MCNC: 1.2 MG/DL — SIGNIFICANT CHANGE UP (ref 0.4–2)
BUN SERPL-MCNC: 19.2 MG/DL — SIGNIFICANT CHANGE UP (ref 8–20)
BURR CELLS BLD QL SMEAR: PRESENT — SIGNIFICANT CHANGE UP
CALCIUM SERPL-MCNC: 8.6 MG/DL — SIGNIFICANT CHANGE UP (ref 8.6–10.2)
CHLORIDE SERPL-SCNC: 106 MMOL/L — SIGNIFICANT CHANGE UP (ref 98–107)
CO2 SERPL-SCNC: 22 MMOL/L — SIGNIFICANT CHANGE UP (ref 22–29)
CREAT SERPL-MCNC: 1.14 MG/DL — SIGNIFICANT CHANGE UP (ref 0.5–1.3)
DACRYOCYTES BLD QL SMEAR: SLIGHT — SIGNIFICANT CHANGE UP
EGFR: 61 ML/MIN/1.73M2 — SIGNIFICANT CHANGE UP
ELLIPTOCYTES BLD QL SMEAR: SLIGHT — SIGNIFICANT CHANGE UP
EOSINOPHIL # BLD AUTO: 0.09 K/UL — SIGNIFICANT CHANGE UP (ref 0–0.5)
EOSINOPHIL NFR BLD AUTO: 0.9 % — SIGNIFICANT CHANGE UP (ref 0–6)
GLUCOSE SERPL-MCNC: 102 MG/DL — HIGH (ref 70–99)
HCT VFR BLD CALC: 42.7 % — SIGNIFICANT CHANGE UP (ref 39–50)
HGB BLD-MCNC: 14.3 G/DL — SIGNIFICANT CHANGE UP (ref 13–17)
LYMPHOCYTES # BLD AUTO: 0.25 K/UL — LOW (ref 1–3.3)
LYMPHOCYTES # BLD AUTO: 2.6 % — LOW (ref 13–44)
MACROCYTES BLD QL: SLIGHT — SIGNIFICANT CHANGE UP
MANUAL SMEAR VERIFICATION: SIGNIFICANT CHANGE UP
MCHC RBC-ENTMCNC: 31.4 PG — SIGNIFICANT CHANGE UP (ref 27–34)
MCHC RBC-ENTMCNC: 33.5 GM/DL — SIGNIFICANT CHANGE UP (ref 32–36)
MCV RBC AUTO: 93.8 FL — SIGNIFICANT CHANGE UP (ref 80–100)
METHOD TYPE: SIGNIFICANT CHANGE UP
MONOCYTES # BLD AUTO: 0.66 K/UL — SIGNIFICANT CHANGE UP (ref 0–0.9)
MONOCYTES NFR BLD AUTO: 6.9 % — SIGNIFICANT CHANGE UP (ref 2–14)
MYELOCYTES NFR BLD: 0.9 % — HIGH (ref 0–0)
NEUTROPHILS # BLD AUTO: 8.46 K/UL — HIGH (ref 1.8–7.4)
NEUTROPHILS NFR BLD AUTO: 86.9 % — HIGH (ref 43–77)
NEUTS BAND # BLD: 0.9 % — SIGNIFICANT CHANGE UP (ref 0–8)
PLAT MORPH BLD: NORMAL — SIGNIFICANT CHANGE UP
PLATELET # BLD AUTO: 139 K/UL — LOW (ref 150–400)
POIKILOCYTOSIS BLD QL AUTO: SIGNIFICANT CHANGE UP
POLYCHROMASIA BLD QL SMEAR: SIGNIFICANT CHANGE UP
POTASSIUM SERPL-MCNC: 4.1 MMOL/L — SIGNIFICANT CHANGE UP (ref 3.5–5.3)
POTASSIUM SERPL-SCNC: 4.1 MMOL/L — SIGNIFICANT CHANGE UP (ref 3.5–5.3)
PROT SERPL-MCNC: 5.5 G/DL — LOW (ref 6.6–8.7)
RAPID RVP RESULT: SIGNIFICANT CHANGE UP
RBC # BLD: 4.55 M/UL — SIGNIFICANT CHANGE UP (ref 4.2–5.8)
RBC # FLD: 14.9 % — HIGH (ref 10.3–14.5)
RBC BLD AUTO: ABNORMAL
SARS-COV-2 RNA SPEC QL NAA+PROBE: SIGNIFICANT CHANGE UP
SODIUM SERPL-SCNC: 140 MMOL/L — SIGNIFICANT CHANGE UP (ref 135–145)
TROPONIN T SERPL-MCNC: <0.01 NG/ML — SIGNIFICANT CHANGE UP (ref 0–0.06)
WBC # BLD: 9.63 K/UL — SIGNIFICANT CHANGE UP (ref 3.8–10.5)
WBC # FLD AUTO: 9.63 K/UL — SIGNIFICANT CHANGE UP (ref 3.8–10.5)

## 2022-06-14 PROCEDURE — 93010 ELECTROCARDIOGRAM REPORT: CPT

## 2022-06-14 PROCEDURE — 99223 1ST HOSP IP/OBS HIGH 75: CPT

## 2022-06-14 PROCEDURE — 71045 X-RAY EXAM CHEST 1 VIEW: CPT | Mod: 26

## 2022-06-14 PROCEDURE — 99285 EMERGENCY DEPT VISIT HI MDM: CPT | Mod: CS,GC

## 2022-06-14 RX ORDER — TRAZODONE HCL 50 MG
100 TABLET ORAL AT BEDTIME
Refills: 0 | Status: DISCONTINUED | OUTPATIENT
Start: 2022-06-14 | End: 2022-06-22

## 2022-06-14 RX ORDER — ONDANSETRON 8 MG/1
4 TABLET, FILM COATED ORAL EVERY 8 HOURS
Refills: 0 | Status: DISCONTINUED | OUTPATIENT
Start: 2022-06-14 | End: 2022-06-22

## 2022-06-14 RX ORDER — ALBUTEROL 90 UG/1
2 AEROSOL, METERED ORAL
Refills: 0 | Status: DISCONTINUED | OUTPATIENT
Start: 2022-06-14 | End: 2022-06-22

## 2022-06-14 RX ORDER — METOPROLOL TARTRATE 50 MG
50 TABLET ORAL DAILY
Refills: 0 | Status: DISCONTINUED | OUTPATIENT
Start: 2022-06-14 | End: 2022-06-22

## 2022-06-14 RX ORDER — ATORVASTATIN CALCIUM 80 MG/1
20 TABLET, FILM COATED ORAL AT BEDTIME
Refills: 0 | Status: DISCONTINUED | OUTPATIENT
Start: 2022-06-14 | End: 2022-06-22

## 2022-06-14 RX ORDER — DONEPEZIL HYDROCHLORIDE 10 MG/1
10 TABLET, FILM COATED ORAL AT BEDTIME
Refills: 0 | Status: DISCONTINUED | OUTPATIENT
Start: 2022-06-14 | End: 2022-06-22

## 2022-06-14 RX ORDER — CEFEPIME 1 G/1
2000 INJECTION, POWDER, FOR SOLUTION INTRAMUSCULAR; INTRAVENOUS EVERY 8 HOURS
Refills: 0 | Status: DISCONTINUED | OUTPATIENT
Start: 2022-06-14 | End: 2022-06-14

## 2022-06-14 RX ORDER — DILTIAZEM HCL 120 MG
60 CAPSULE, EXT RELEASE 24 HR ORAL
Refills: 0 | Status: DISCONTINUED | OUTPATIENT
Start: 2022-06-14 | End: 2022-06-22

## 2022-06-14 RX ORDER — CEFEPIME 1 G/1
2000 INJECTION, POWDER, FOR SOLUTION INTRAMUSCULAR; INTRAVENOUS EVERY 8 HOURS
Refills: 0 | Status: DISCONTINUED | OUTPATIENT
Start: 2022-06-14 | End: 2022-06-21

## 2022-06-14 RX ORDER — AZITHROMYCIN 500 MG/1
500 TABLET, FILM COATED ORAL ONCE
Refills: 0 | Status: DISCONTINUED | OUTPATIENT
Start: 2022-06-14 | End: 2022-06-14

## 2022-06-14 RX ORDER — ACETAMINOPHEN 500 MG
650 TABLET ORAL EVERY 6 HOURS
Refills: 0 | Status: DISCONTINUED | OUTPATIENT
Start: 2022-06-14 | End: 2022-06-22

## 2022-06-14 RX ORDER — CEFEPIME 1 G/1
2000 INJECTION, POWDER, FOR SOLUTION INTRAMUSCULAR; INTRAVENOUS ONCE
Refills: 0 | Status: DISCONTINUED | OUTPATIENT
Start: 2022-06-14 | End: 2022-06-14

## 2022-06-14 RX ORDER — CEFEPIME 1 G/1
2000 INJECTION, POWDER, FOR SOLUTION INTRAMUSCULAR; INTRAVENOUS ONCE
Refills: 0 | Status: COMPLETED | OUTPATIENT
Start: 2022-06-14 | End: 2022-06-14

## 2022-06-14 RX ORDER — CEFTRIAXONE 500 MG/1
1000 INJECTION, POWDER, FOR SOLUTION INTRAMUSCULAR; INTRAVENOUS ONCE
Refills: 0 | Status: DISCONTINUED | OUTPATIENT
Start: 2022-06-14 | End: 2022-06-14

## 2022-06-14 RX ORDER — VENLAFAXINE HCL 75 MG
75 CAPSULE, EXT RELEASE 24 HR ORAL DAILY
Refills: 0 | Status: DISCONTINUED | OUTPATIENT
Start: 2022-06-14 | End: 2022-06-22

## 2022-06-14 RX ORDER — MIRTAZAPINE 45 MG/1
7.5 TABLET, ORALLY DISINTEGRATING ORAL AT BEDTIME
Refills: 0 | Status: DISCONTINUED | OUTPATIENT
Start: 2022-06-14 | End: 2022-06-22

## 2022-06-14 RX ORDER — LANOLIN ALCOHOL/MO/W.PET/CERES
3 CREAM (GRAM) TOPICAL AT BEDTIME
Refills: 0 | Status: DISCONTINUED | OUTPATIENT
Start: 2022-06-14 | End: 2022-06-22

## 2022-06-14 RX ORDER — CEFEPIME 1 G/1
INJECTION, POWDER, FOR SOLUTION INTRAMUSCULAR; INTRAVENOUS
Refills: 0 | Status: DISCONTINUED | OUTPATIENT
Start: 2022-06-14 | End: 2022-06-14

## 2022-06-14 RX ORDER — RIVAROXABAN 15 MG-20MG
10 KIT ORAL DAILY
Refills: 0 | Status: DISCONTINUED | OUTPATIENT
Start: 2022-06-14 | End: 2022-06-22

## 2022-06-14 RX ADMIN — Medication 60 MILLIGRAM(S): at 20:27

## 2022-06-14 RX ADMIN — CEFEPIME 100 MILLIGRAM(S): 1 INJECTION, POWDER, FOR SOLUTION INTRAMUSCULAR; INTRAVENOUS at 14:25

## 2022-06-14 RX ADMIN — Medication 50 MILLIGRAM(S): at 18:46

## 2022-06-14 RX ADMIN — DONEPEZIL HYDROCHLORIDE 10 MILLIGRAM(S): 10 TABLET, FILM COATED ORAL at 18:46

## 2022-06-14 RX ADMIN — CEFEPIME 100 MILLIGRAM(S): 1 INJECTION, POWDER, FOR SOLUTION INTRAMUSCULAR; INTRAVENOUS at 23:12

## 2022-06-14 RX ADMIN — Medication 60 MILLIGRAM(S): at 23:12

## 2022-06-14 RX ADMIN — Medication 100 MILLIGRAM(S): at 23:13

## 2022-06-14 RX ADMIN — ATORVASTATIN CALCIUM 20 MILLIGRAM(S): 80 TABLET, FILM COATED ORAL at 18:46

## 2022-06-14 RX ADMIN — MIRTAZAPINE 7.5 MILLIGRAM(S): 45 TABLET, ORALLY DISINTEGRATING ORAL at 23:13

## 2022-06-14 NOTE — H&P ADULT - NSHPREVIEWOFSYSTEMS_GEN_ALL_CORE
REVIEW OF SYSTEMS:    CONSTITUTIONAL: No weakness, fevers or chills  EYES: No vertigo or throat pain  ENT: No visual changes, eye pain  MOUTH: moist fidelia mucosal, no mouth ulcers  NECK: No pain or stiffness  RESPIRATORY: No cough, wheezing, hemoptysis; No shortness of breath  CARDIOVASCULAR: No chest pain or palpitations  GASTROINTESTINAL: No abdominal or epigastric pain. No nausea, vomiting, or hematemesis; No diarrhea or constipation. No melena or hematochezia.  GENITOURINARY: No dysuria, frequency or hematuria  NEUROLOGICAL: No numbness or weakness  SKIN: No itching, rashes  PSYCH: No anxiety or depression

## 2022-06-14 NOTE — H&P ADULT - ASSESSMENT
89 y/o male with Hx of MDD, Dementia, HLD, HTN, prostate cancer, recent admission for UTI and COVID, presents with worsening hypoxia, found to have RUL PNA that failed PO Levaquin.    #Recent COVID 19 - s/p vaccination and booster, diagnosed 5/27 and completed Remdesivir and decadron x 3 days  #Superimposed bacterial PNA  #Acute hypoxia respiratory failure  -RVP/COVID negative   -CT result noted above  -start Cefepime given PCN allergy and recent admission  -f/u urine legionella and MRSA PCR  -aspiration precaution   -c/w Xeralto for COVID  -ID consulted, harsh recs  -on 2L NC, wean as tolerated  -obtain ambulatory O2 in AM    #Positive urine culture - sepsis not present on admission  -s/p tx for proteus UTI with Vantin 2 weeks ago  -UCx with enterococcus as well as pseudomonas - pt denies symptoms, ?colonization  -Cefepime as above  -ID recs harsh  -recent renal u/s with only medical renal diseases noted    #Dementia  -Fall risk  -restart Donepezil, Mirtazapine, Trazodone, Venlafaxine   -aspiration precaution     #HLD  #HTN  -c/w Metoprolol and Cardizem     #Prostate cancer  #Overactive bladder   -takes Gemtesa at home, resume upon discharge    DVT ppx: Xeralto  Diet: Regular  Dispo: , pending clinical course    89 y/o male with Hx of MDD, Dementia, HLD, HTN, prostate cancer, recent admission for UTI and COVID, presents with worsening hypoxia, found to have RUL PNA that failed PO Levaquin.    #Recent COVID 19 - s/p vaccination and booster, diagnosed 5/27 and completed Remdesivir and decadron x 3 days  #Superimposed bacterial PNA - HAP vs. aspiration PNA  #Acute hypoxia respiratory failure  -RVP/COVID negative   -CT result noted above  -start Cefepime given PCN allergy and recent admission  -f/u urine legionella and MRSA PCR  -aspiration precaution   -f/u BCx  -c/w Xeralto for COVID  -ID consulted, harsh recs  -on 2L NC, wean as tolerated  -obtain ambulatory O2 in AM    #Positive urine culture - sepsis not present on admission  -s/p tx for proteus UTI with Vantin 2 weeks ago  -UCx with enterococcus as well as pseudomonas - pt denies symptoms, ?colonization  -Cefepime as above  -ID recs harsh  -recent renal u/s with only medical renal diseases noted    #Dementia  -Fall risk  -restart Donepezil, Mirtazapine, Trazodone, Venlafaxine   -aspiration precaution     #HLD  #HTN  -c/w Metoprolol and Cardizem     #Prostate cancer  #Overactive bladder   -takes Gemtesa at home, resume upon discharge    DVT ppx: Xeralto  Diet: Regular, asp precaution   Dispo: , pending clinical course    89 y/o male with Hx of MDD, Dementia, HLD, HTN, prostate cancer, recent admission for UTI and COVID, presents with worsening hypoxia, found to have RUL PNA that failed PO Levaquin.    #Recent COVID 19 - s/p vaccination and booster, diagnosed 5/27 and completed Remdesivir and decadron x 3 days  #Superimposed bacterial PNA - HAP vs. aspiration PNA  #Acute hypoxia respiratory failure  -RVP/COVID negative   -CT result noted above  -start Cefepime given PCN allergy and recent admission  -f/u urine legionella and MRSA PCR  -aspiration precaution   -f/u BCx  -c/w Xeralto for COVID  -ID consulted, harsh recs  -on 2L NC, wean as tolerated  -obtain ambulatory O2 in AM    #Positive urine culture - sepsis not present on admission  -s/p tx for proteus UTI with Vantin 2 weeks ago  -UCx with enterococcus as well as pseudomonas - pt denies symptoms, ?colonization  -Cefepime as above  -ID recs harsh  -recent renal u/s with only medical renal diseases noted    #Dementia  -Fall risk  -restart Donepezil, Mirtazapine, Trazodone, Venlafaxine   -aspiration precaution     #HLD  #HTN  -c/w Metoprolol and Cardizem     #Prostate cancer  #Overactive bladder   -takes Gemtesa at home, resume upon discharge    DVT ppx: Xeralto  Diet: Regular, asp precaution   Dispo: , pending clinical course       Son Good updated on the phone 6/14

## 2022-06-14 NOTE — ED PROVIDER NOTE - CLINICAL SUMMARY MEDICAL DECISION MAKING FREE TEXT BOX
89 y/o M hx of dementia, recently dced from [facility] er w/ pneumonia on levaquin, presents for shortness of breath. tachycardic and tachypneic on arrival. saturating 94% on room air. recent CTA was performed.  failed PO abx, was previously on avantin for proteus UTI then levaquin PNA.   blood cultures, cxr, labs, ekg. TBA

## 2022-06-14 NOTE — CONSULT NOTE ADULT - SUBJECTIVE AND OBJECTIVE BOX
INFECTIOUS DISEASES AND INTERNAL MEDICINE at [place]  =======================================================  Jack Hooper MD  Diplomates American Board of Internal Medicine and Infectious Diseases  Telephone [phone]  Fax            [phone]  =======================================================    DOMONIQUE [id number]      HPI:  89 y/o male with Hx of MDD, Dementia, HLD, HTN, prostate cancer, recent admission for UTI and COVID, p/w tachypnea and hypoxia. . History obtained from chart review. Patient recently admitted for COVID, dc 2 weeks ago and completed Remdesivir and Decadron x 3 days. Came to the ED 2 days earlier with SOB, and was found to have RUL PNA on CTA, he was discharged back to [facility] with PO Levaquin. Noted this AM by [facility] staff to be continued tachypnea as well as mildly hypoxic on RA, and he was brought in for further care. Upon my examination, patient denies any concerns. Does not report any fever, chill, CP, SOB, and no abd pain, n/v/c/d, nor dysuria nor increased urinary frequency.  URINE CX WITH LOW LEVEL COLONY COUNT FOR PSEUDOMONAS ENTEROCOCCUS   CT SCAN WITH PNEUMONIA  ASKED TO EVALUATE FROM ID STANDPOINT             PAST MEDICAL & SURGICAL HISTORY:  HTN (hypertension)      HLD (hyperlipidemia)      Dementia      Prostate cancer          ANTIBIOTICS  cefepime   IVPB 2000 milliGRAM(s) IV Intermittent every 8 hours      Allergies    penicillin (Hives)    Intolerances        SOCIAL HISTORY:     FAMILY HX   FAMILY HISTORY:      Vital Signs Last 24 Hrs  T(C): 36.9 (14 Jun 2022 16:08), Max: 36.9 (14 Jun 2022 16:08)  T(F): 98.4 (14 Jun 2022 16:08), Max: 98.4 (14 Jun 2022 16:08)  HR: 74 (14 Jun 2022 16:08) (71 - 74)  BP: 146/85 (14 Jun 2022 16:08) (131/76 - 146/85)  BP(mean): --  RR: 18 (14 Jun 2022 16:08) (18 - 18)  SpO2: 99% (14 Jun 2022 16:08) (99% - 100%)  Drug Dosing Weight  Height (cm): 177.8 (14 Jun 2022 15:45)  Weight (kg): 77.111 (14 Jun 2022 15:45)  BMI (kg/m2): 24.4 (14 Jun 2022 15:45)  BSA (m2): 1.95 (14 Jun 2022 15:45)      REVIEW OF SYSTEMS:    CONSTITUTIONAL:  As per HPI.    HEENT:  Eyes:  No diplopia or blurred vision. ENT:  No earache, sore throat or runny nose.    CARDIOVASCULAR:  No pressure, squeezing, strangling, tightness, heaviness or aching about the chest, neck, axilla or epigastrium.    RESPIRATORY:  No cough, shortness of breath, PND or orthopnea.    GASTROINTESTINAL:  No nausea, vomiting or diarrhea.    GENITOURINARY:  No dysuria, frequency or urgency.    MUSCULOSKELETAL:  As per HPI.    SKIN:  No change in skin, hair or nails.    NEUROLOGIC:  No paresthesias, fasciculations, seizures or weakness.                  PHYSICAL EXAMINATION:    GENERAL: The patient is a _____in no apparent distress. ___     VITAL SIGNS: T(C): 36.9 (06-14-22 @ 16:08), Max: 36.9 (06-14-22 @ 16:08)  HR: 74 (06-14-22 @ 16:08) (71 - 74)  BP: 146/85 (06-14-22 @ 16:08) (131/76 - 146/85)  RR: 18 (06-14-22 @ 16:08) (18 - 18)  SpO2: 99% (06-14-22 @ 16:08) (99% - 100%)  Wt(kg): --    HEENT: Head is normocephalic and atraumatic.  ANICTERIC  NECK: Supple. No carotid bruits.  No lymphadenopathy or thyromegaly.    LUNGS:COARSE BREATH SOUNDS    HEART: Regular rate and rhythm without murmur.    ABDOMEN: Soft, nontender, and nondistended.  Positive bowel sounds.  No hepatosplenomegaly was noted. NO REBOUND NO GUARDING    EXTREMITIES: NO EDEMA NO ERYTHEMA    NEUROLOGIC: NON FOCAL      SKIN: No ulceration or induration present. NO RASH        BLOOD CULTURES  Culture Results:   10,000 - 49,000 CFU/mL Pseudomonas aeruginosa  10,000 - 49,000 CFU/mL Enterococcus faecalis (06-12 @ 10:45)       URINE CX          LABS:                        14.3   9.63  )-----------( 139      ( 14 Jun 2022 13:42 )             42.7     06-14    140  |  106  |  19.2  ----------------------------<  102<H>  4.1   |  22.0  |  1.14    Ca    8.6      14 Jun 2022 13:42    TPro  5.5<L>  /  Alb  2.8<L>  /  TBili  1.2  /  DBili  x   /  AST  14  /  ALT  11  /  AlkPhos  83  06-14          RADIOLOGY & ADDITIONAL STUDIES:      ASSESSMENT/PLAN    89 y/o male with Hx of MDD, Dementia, HLD, HTN, prostate cancer, recent admission for UTI and COVID, p/w tachypnea and hypoxia. . History obtained from chart review. Patient recently admitted for COVID, dc 2 weeks ago and completed Remdesivir and Decadron x 3 days. Came to the ED 2 days earlier with SOB, and was found to have RUL PNA on CTA, he was discharged back to [facility] with PO Levaquin. Noted this AM by [facility] staff to be continued tachypnea as well as mildly hypoxic on RA, and he was brought in for further care. Upon my examination, patient denies any concerns. Does not report any fever, chill, CP, SOB, and no abd pain, n/v/c/d, nor dysuria nor increased urinary frequency.  URINE CX WITH LOW LEVEL COLONY COUNT FOR PSEUDOMONAS ENTEROCOCCUS   CT SCAN WITH PNEUMONIA   PT PLACED ON IV ABX CEFIPIME  FOR PNEUMONIA  URINE CX AS ABOVE LOW LEVEL COUNT  UNCLEAR SIGNIFICANCE  WILL FOLLOWUP  BLOOD CX  WILL FOLLOWUP WITH FURTHER RECOMMENDATIONS               SAMEERA ALLEN MD

## 2022-06-14 NOTE — H&P ADULT - HISTORY OF PRESENT ILLNESS
89 y/o male with Hx of MDD, Dementia, HLD, HTN, prostate cancer, recent admission for UTI and COVID, p/w tachypnea and hypoxia. Patient demented, history obtained from chart review. Patient recently admitted for COVID, dc 2 weeks ago and completed Remdesivir and Decadron x 3 days. Came to the ED 2 days earlier with SOB, and was found to have RUL PNA on CTA, he was discharged back to [place] with PO Levaquin. Noted this AM by [facility] staff to be continued tachypnea as well as mildly hypoxic on RA, and he was brought in for further care. Upon my examination, patient denies any concerns. Does not report any fever, chill, CP, SOB, and no abd pain, n/v/c/d, nor dysuria nor increased urinary frequency.     ED vitals and labs stable. Given failed PO Levaquin, admitted for further care.  89 y/o male with Hx of MDD, Dementia, HLD, HTN, prostate cancer, recent admission for UTI and COVID, p/w tachypnea and hypoxia. Patient demented, unknown why he is in the hospital. History obtained from chart review. Patient recently admitted for COVID, dc 2 weeks ago and completed Remdesivir and Decadron x 3 days. Came to the ED 2 days earlier with SOB, and was found to have RUL PNA on CTA, he was discharged back to [place] with PO Levaquin. Noted this AM by [place] staff to be continued tachypnea as well as mildly hypoxic on RA, and he was brought in for further care. Upon my examination, patient denies any concerns. Does not report any fever, chill, CP, SOB, and no abd pain, n/v/c/d, nor dysuria nor increased urinary frequency.     ED vitals and labs stable. Given failed PO Levaquin, admitted for further care.

## 2022-06-14 NOTE — H&P ADULT - NSHPLABSRESULTS_GEN_ALL_CORE
LABS:                        14.3   9.63  )-----------( 139      ( 14 Jun 2022 13:42 )             42.7     06-14    140  |  106  |  19.2  ----------------------------<  102<H>  4.1   |  22.0  |  1.14    Ca    8.6      14 Jun 2022 13:42    TPro  5.5<L>  /  Alb  2.8<L>  /  TBili  1.2  /  DBili  x   /  AST  14  /  ALT  11  /  AlkPhos  83  06-14      CARDIAC MARKERS ( 14 Jun 2022 13:42 )  x     / <0.01 ng/mL / x     / x     / x              Culture - Urine (collected 12 Jun 2022 10:45)  Source: Clean Catch Clean Catch (Midstream)  Preliminary Report (14 Jun 2022 15:05):    10,000 - 49,000 CFU/mL Pseudomonas aeruginosa    10,000 - 49,000 CFU/mL Enterococcus faecalis  Organism: Pseudomonas aeruginosa (14 Jun 2022 08:26)  Organism: Pseudomonas aeruginosa (14 Jun 2022 08:26)    < from: CT Angio Chest PE Protocol w/ IV Cont (06.12.22 @ 03:14) >    IMPRESSION:    No CT evidence of pulmonary embolism.    Patchy opacity in the right upper lobe may be infectious/inflammatory.    < end of copied text >

## 2022-06-14 NOTE — ED ADULT TRIAGE NOTE - CHIEF COMPLAINT QUOTE
pt a+ox3, BIBA from [facility] for worsening SOB. pt just d/c'd for pneumonia. pt tachypneic in triage, maintaining 02 on 2L NC.

## 2022-06-14 NOTE — ED PROVIDER NOTE - OBJECTIVE STATEMENT
88 y/o M hx of dementia, recently dced from [facility] er w/ pneumonia on levaquin, presents for shortness of breath. tachycardic and tachypneic on arrival. unable to obtain hx from patient due to dementia.

## 2022-06-14 NOTE — ED ADULT NURSE NOTE - CHIEF COMPLAINT QUOTE
pt a+ox3, BIBA from [facility] for worsening SOB. pt just d/c'd for pneumonia. pt tachypneic in triage, maintaining 02 on 2L NC.

## 2022-06-14 NOTE — H&P ADULT - NSHPPHYSICALEXAM_GEN_ALL_CORE
VITALS:   T(C): 36.7 (06-14-22 @ 11:40), Max: 36.7 (06-14-22 @ 11:40)  HR: 71 (06-14-22 @ 11:40) (71 - 71)  BP: 131/76 (06-14-22 @ 11:40) (131/76 - 131/76)  RR: 18 (06-14-22 @ 11:40) (18 - 18)  SpO2: 100% (06-14-22 @ 11:40) (100% - 100%)    GENERAL: NAD, lying in bed comfortably on NC  HEAD:  Atraumatic, Normocephalic  EYES: EOMI, PERRLA, conjunctiva and sclera clear  ENT: Moist mucous membranes  NECK: Supple, No JVD  CHEST/LUNG: Course breath sound bilaterally. Unlabored respirations  HEART: Regular rate and rhythm; No murmurs, rubs, or gallops  ABDOMEN: BSx4; Soft, nontender, nondistended  EXTREMITIES:  2+ Peripheral Pulses, brisk capillary refill. No clubbing, cyanosis, or edema  NERVOUS SYSTEM:  A&Ox2, no focal deficits   SKIN: No rashes or lesions  PSYCH: Normal affect, euthymic mood

## 2022-06-14 NOTE — ED PROVIDER NOTE - PHYSICAL EXAMINATION
General: tachypneic appearing male in no acute distress  HEENT: Normocephalic, atraumatic. Moist mucous membranes. Oropharynx clear. No lymphadenopathy.  Eyes: No scleral icterus. EOMI. CYNDI.  Neck:. Soft and supple. Full ROM without pain. No midline tenderness  Cardiac: Regular rate and regular rhythm. No murmurs, rubs, gallops. Peripheral pulses 2+ and symmetric. No LE edema.  Resp: Lungs CTAB. Speaking in full sentences. No wheezes, rales or rhonchi.  Abd: Soft, non-tender, non-distended. No guarding or rebound. No scars, masses, or lesions.  Back: Spine midline and non-tender. No CVA tenderness.    Skin: No rashes, abrasions, or lacerations. +avulsion over LUE covered w/ bandage   Neuro: AO x 3. Moves all extremities symmetrically. Motor strength and sensation grossly intact.

## 2022-06-14 NOTE — ED PROVIDER NOTE - ATTENDING CONTRIBUTION TO CARE
Dr. Ruggiero : I have personally seen and examined this patient at the bedside. I have fully participated in the care of this patient. I have reviewed all pertinent clinical information, including history, physical exam, plan and the Resident's note and agree except as noted.       89 y/o M hx of dementia, recent covid infection, recently dced from [facility] er w/ pneumonia on levaquin, pw worsening tachypnea/sob. unable to obtain hx from patient due to dementia - denies all symptoms not sure why he is in the hospital      PE:  head; atraumatic normocephalic  eyes: perrla  Heart: rrr s1s2  lungs: ctab  abd: soft, nt nd + bs no rebound/guarding no cva ttp  le: no swelling no calf ttp  back: no midline cervical/thoracic/lumbar ttp      -->+ct chest + pna 2 days ago was startedo n levaquin +uti -pseudomonos on cx--will admit as failed po abx; iv abx labs

## 2022-06-15 LAB
-  AMPICILLIN: SIGNIFICANT CHANGE UP
-  CIPROFLOXACIN: SIGNIFICANT CHANGE UP
-  LEVOFLOXACIN: SIGNIFICANT CHANGE UP
-  NITROFURANTOIN: SIGNIFICANT CHANGE UP
-  TETRACYCLINE: SIGNIFICANT CHANGE UP
-  VANCOMYCIN: SIGNIFICANT CHANGE UP
ANION GAP SERPL CALC-SCNC: 9 MMOL/L — SIGNIFICANT CHANGE UP (ref 5–17)
BUN SERPL-MCNC: 18.8 MG/DL — SIGNIFICANT CHANGE UP (ref 8–20)
CALCIUM SERPL-MCNC: 8.3 MG/DL — LOW (ref 8.6–10.2)
CHLORIDE SERPL-SCNC: 109 MMOL/L — HIGH (ref 98–107)
CO2 SERPL-SCNC: 24 MMOL/L — SIGNIFICANT CHANGE UP (ref 22–29)
CREAT SERPL-MCNC: 1.25 MG/DL — SIGNIFICANT CHANGE UP (ref 0.5–1.3)
EGFR: 55 ML/MIN/1.73M2 — LOW
GLUCOSE SERPL-MCNC: 122 MG/DL — HIGH (ref 70–99)
HCT VFR BLD CALC: 39.9 % — SIGNIFICANT CHANGE UP (ref 39–50)
HGB BLD-MCNC: 13.3 G/DL — SIGNIFICANT CHANGE UP (ref 13–17)
MAGNESIUM SERPL-MCNC: 1.9 MG/DL — SIGNIFICANT CHANGE UP (ref 1.6–2.6)
MCHC RBC-ENTMCNC: 31.7 PG — SIGNIFICANT CHANGE UP (ref 27–34)
MCHC RBC-ENTMCNC: 33.3 GM/DL — SIGNIFICANT CHANGE UP (ref 32–36)
MCV RBC AUTO: 95.2 FL — SIGNIFICANT CHANGE UP (ref 80–100)
METHOD TYPE: SIGNIFICANT CHANGE UP
MRSA PCR RESULT.: SIGNIFICANT CHANGE UP
PHOSPHATE SERPL-MCNC: 2.7 MG/DL — SIGNIFICANT CHANGE UP (ref 2.4–4.7)
PLATELET # BLD AUTO: 137 K/UL — LOW (ref 150–400)
POTASSIUM SERPL-MCNC: 4.1 MMOL/L — SIGNIFICANT CHANGE UP (ref 3.5–5.3)
POTASSIUM SERPL-SCNC: 4.1 MMOL/L — SIGNIFICANT CHANGE UP (ref 3.5–5.3)
RBC # BLD: 4.19 M/UL — LOW (ref 4.2–5.8)
RBC # FLD: 15.2 % — HIGH (ref 10.3–14.5)
S AUREUS DNA NOSE QL NAA+PROBE: SIGNIFICANT CHANGE UP
SODIUM SERPL-SCNC: 142 MMOL/L — SIGNIFICANT CHANGE UP (ref 135–145)
WBC # BLD: 10.11 K/UL — SIGNIFICANT CHANGE UP (ref 3.8–10.5)
WBC # FLD AUTO: 10.11 K/UL — SIGNIFICANT CHANGE UP (ref 3.8–10.5)

## 2022-06-15 PROCEDURE — 99232 SBSQ HOSP IP/OBS MODERATE 35: CPT

## 2022-06-15 RX ADMIN — CEFEPIME 100 MILLIGRAM(S): 1 INJECTION, POWDER, FOR SOLUTION INTRAMUSCULAR; INTRAVENOUS at 17:44

## 2022-06-15 RX ADMIN — Medication 5 MILLIGRAM(S): at 05:20

## 2022-06-15 RX ADMIN — Medication 60 MILLIGRAM(S): at 05:20

## 2022-06-15 RX ADMIN — DONEPEZIL HYDROCHLORIDE 10 MILLIGRAM(S): 10 TABLET, FILM COATED ORAL at 22:48

## 2022-06-15 RX ADMIN — RIVAROXABAN 10 MILLIGRAM(S): KIT at 13:19

## 2022-06-15 RX ADMIN — Medication 50 MILLIGRAM(S): at 05:20

## 2022-06-15 RX ADMIN — Medication 100 MILLIGRAM(S): at 22:48

## 2022-06-15 RX ADMIN — Medication 60 MILLIGRAM(S): at 17:44

## 2022-06-15 RX ADMIN — CEFEPIME 100 MILLIGRAM(S): 1 INJECTION, POWDER, FOR SOLUTION INTRAMUSCULAR; INTRAVENOUS at 22:47

## 2022-06-15 RX ADMIN — CEFEPIME 100 MILLIGRAM(S): 1 INJECTION, POWDER, FOR SOLUTION INTRAMUSCULAR; INTRAVENOUS at 05:20

## 2022-06-15 RX ADMIN — Medication 75 MILLIGRAM(S): at 17:38

## 2022-06-15 RX ADMIN — MIRTAZAPINE 7.5 MILLIGRAM(S): 45 TABLET, ORALLY DISINTEGRATING ORAL at 22:48

## 2022-06-15 RX ADMIN — Medication 60 MILLIGRAM(S): at 13:19

## 2022-06-15 RX ADMIN — Medication 60 MILLIGRAM(S): at 23:13

## 2022-06-15 RX ADMIN — ATORVASTATIN CALCIUM 20 MILLIGRAM(S): 80 TABLET, FILM COATED ORAL at 22:48

## 2022-06-15 NOTE — PATIENT PROFILE ADULT - FALL HARM RISK - HARM RISK INTERVENTIONS

## 2022-06-16 ENCOUNTER — TRANSCRIPTION ENCOUNTER (OUTPATIENT)
Age: 87
End: 2022-06-16

## 2022-06-16 LAB
ANION GAP SERPL CALC-SCNC: 6 MMOL/L — SIGNIFICANT CHANGE UP (ref 5–17)
BASOPHILS # BLD AUTO: 0.03 K/UL — SIGNIFICANT CHANGE UP (ref 0–0.2)
BASOPHILS NFR BLD AUTO: 0.4 % — SIGNIFICANT CHANGE UP (ref 0–2)
BUN SERPL-MCNC: 21.2 MG/DL — HIGH (ref 8–20)
CALCIUM SERPL-MCNC: 7.8 MG/DL — LOW (ref 8.6–10.2)
CHLORIDE SERPL-SCNC: 112 MMOL/L — HIGH (ref 98–107)
CO2 SERPL-SCNC: 22 MMOL/L — SIGNIFICANT CHANGE UP (ref 22–29)
CREAT SERPL-MCNC: 1.16 MG/DL — SIGNIFICANT CHANGE UP (ref 0.5–1.3)
CULTURE RESULTS: SIGNIFICANT CHANGE UP
EGFR: 60 ML/MIN/1.73M2 — SIGNIFICANT CHANGE UP
EOSINOPHIL # BLD AUTO: 0.28 K/UL — SIGNIFICANT CHANGE UP (ref 0–0.5)
EOSINOPHIL NFR BLD AUTO: 3.6 % — SIGNIFICANT CHANGE UP (ref 0–6)
GLUCOSE SERPL-MCNC: 108 MG/DL — HIGH (ref 70–99)
HCT VFR BLD CALC: 39.9 % — SIGNIFICANT CHANGE UP (ref 39–50)
HGB BLD-MCNC: 13.3 G/DL — SIGNIFICANT CHANGE UP (ref 13–17)
IMM GRANULOCYTES NFR BLD AUTO: 0.6 % — SIGNIFICANT CHANGE UP (ref 0–1.5)
LYMPHOCYTES # BLD AUTO: 0.95 K/UL — LOW (ref 1–3.3)
LYMPHOCYTES # BLD AUTO: 12.2 % — LOW (ref 13–44)
MAGNESIUM SERPL-MCNC: 1.9 MG/DL — SIGNIFICANT CHANGE UP (ref 1.6–2.6)
MCHC RBC-ENTMCNC: 31.8 PG — SIGNIFICANT CHANGE UP (ref 27–34)
MCHC RBC-ENTMCNC: 33.3 GM/DL — SIGNIFICANT CHANGE UP (ref 32–36)
MCV RBC AUTO: 95.5 FL — SIGNIFICANT CHANGE UP (ref 80–100)
MONOCYTES # BLD AUTO: 0.8 K/UL — SIGNIFICANT CHANGE UP (ref 0–0.9)
MONOCYTES NFR BLD AUTO: 10.3 % — SIGNIFICANT CHANGE UP (ref 2–14)
NEUTROPHILS # BLD AUTO: 5.65 K/UL — SIGNIFICANT CHANGE UP (ref 1.8–7.4)
NEUTROPHILS NFR BLD AUTO: 72.9 % — SIGNIFICANT CHANGE UP (ref 43–77)
ORGANISM # SPEC MICROSCOPIC CNT: SIGNIFICANT CHANGE UP
PHOSPHATE SERPL-MCNC: 2.6 MG/DL — SIGNIFICANT CHANGE UP (ref 2.4–4.7)
PLATELET # BLD AUTO: 124 K/UL — LOW (ref 150–400)
POTASSIUM SERPL-MCNC: 3.4 MMOL/L — LOW (ref 3.5–5.3)
POTASSIUM SERPL-SCNC: 3.4 MMOL/L — LOW (ref 3.5–5.3)
RBC # BLD: 4.18 M/UL — LOW (ref 4.2–5.8)
RBC # FLD: 15.6 % — HIGH (ref 10.3–14.5)
SODIUM SERPL-SCNC: 140 MMOL/L — SIGNIFICANT CHANGE UP (ref 135–145)
SPECIMEN SOURCE: SIGNIFICANT CHANGE UP
WBC # BLD: 7.76 K/UL — SIGNIFICANT CHANGE UP (ref 3.8–10.5)
WBC # FLD AUTO: 7.76 K/UL — SIGNIFICANT CHANGE UP (ref 3.8–10.5)

## 2022-06-16 PROCEDURE — 99232 SBSQ HOSP IP/OBS MODERATE 35: CPT

## 2022-06-16 RX ORDER — POTASSIUM CHLORIDE 20 MEQ
40 PACKET (EA) ORAL ONCE
Refills: 0 | Status: COMPLETED | OUTPATIENT
Start: 2022-06-16 | End: 2022-06-16

## 2022-06-16 RX ADMIN — CEFEPIME 100 MILLIGRAM(S): 1 INJECTION, POWDER, FOR SOLUTION INTRAMUSCULAR; INTRAVENOUS at 13:20

## 2022-06-16 RX ADMIN — Medication 60 MILLIGRAM(S): at 11:12

## 2022-06-16 RX ADMIN — ATORVASTATIN CALCIUM 20 MILLIGRAM(S): 80 TABLET, FILM COATED ORAL at 21:27

## 2022-06-16 RX ADMIN — Medication 75 MILLIGRAM(S): at 13:20

## 2022-06-16 RX ADMIN — Medication 50 MILLIGRAM(S): at 06:01

## 2022-06-16 RX ADMIN — Medication 60 MILLIGRAM(S): at 06:00

## 2022-06-16 RX ADMIN — Medication 40 MILLIEQUIVALENT(S): at 17:51

## 2022-06-16 RX ADMIN — DONEPEZIL HYDROCHLORIDE 10 MILLIGRAM(S): 10 TABLET, FILM COATED ORAL at 21:27

## 2022-06-16 RX ADMIN — CEFEPIME 100 MILLIGRAM(S): 1 INJECTION, POWDER, FOR SOLUTION INTRAMUSCULAR; INTRAVENOUS at 06:00

## 2022-06-16 RX ADMIN — RIVAROXABAN 10 MILLIGRAM(S): KIT at 11:12

## 2022-06-16 RX ADMIN — Medication 40 MILLIEQUIVALENT(S): at 11:12

## 2022-06-16 RX ADMIN — CEFEPIME 100 MILLIGRAM(S): 1 INJECTION, POWDER, FOR SOLUTION INTRAMUSCULAR; INTRAVENOUS at 21:27

## 2022-06-16 RX ADMIN — Medication 60 MILLIGRAM(S): at 23:31

## 2022-06-16 RX ADMIN — Medication 100 MILLIGRAM(S): at 21:27

## 2022-06-16 RX ADMIN — Medication 60 MILLIGRAM(S): at 17:52

## 2022-06-16 RX ADMIN — Medication 5 MILLIGRAM(S): at 06:01

## 2022-06-16 RX ADMIN — MIRTAZAPINE 7.5 MILLIGRAM(S): 45 TABLET, ORALLY DISINTEGRATING ORAL at 21:27

## 2022-06-16 NOTE — DISCHARGE NOTE NURSING/CASE MANAGEMENT/SOCIAL WORK - NSDCFUADDAPPT_GEN_ALL_CORE_FT
STAR DOCUMENTATION:  Pulmo Appointment made with Dr. Maier 6/2/22 at 11:00. If you are unable to attend your pre-scheduled appointment, please contact the office directly at [phone] to reschedule  [address] [place]i [facility]ARGAS   MEDS TO BED NOT APPLICABLE;ASSISTED LIVING FACILITY WILL MANAGE PT'S MEDS     STAR DOCUMENTATION:  Pulmo Appointment made with Dr. Maier 6/2/22 at 11:00. If you are unable to attend your pre-scheduled appointment, please contact the office directly at [phone] to reschedule  [address] [place]i VARGAS   MEDS TO BED NOT APPLICABLE;[facility] MANAGE PT'S MEDS     ***STAR PNEUMONIA*** A follow-up Pulmonologist appointment has been scheduled for 6/28/2022 at 11:00am with Dr. Lane Maier, [address] [place] [address]. If you are unable to attend this appointment, please call their office directly at [phone] to reschedule. [facility] facility to manage patient's medications upon discharge. ***STAR PNEUMONIA*** A follow-up Pulmonologist appointment has been scheduled for 6/28/2022 at 11:00am with Dr. Lane Maier, [address] [place] [address]. If you are unable to attend this appointment, please call their office directly at [phone] to reschedule. Assisted Living facility to manage patient's medications upon discharge. ***STAR PNEUMONIA*** A follow-up Pulmonologist appointment has been scheduled for 6/28/2022 at 11:00am with Dr. Lane Maier, [address] [place] [address]. If you are unable to attend this appointment, please call their office directly at [phone] to reschedule. [facility] facility to manage patient's medications upon discharge.

## 2022-06-16 NOTE — PHYSICAL THERAPY INITIAL EVALUATION ADULT - ADDITIONAL COMMENTS
Pt alert but confused, follows commands. Pt reports that he is previously independent with a RW but is unable to provide more detail.

## 2022-06-16 NOTE — DISCHARGE NOTE NURSING/CASE MANAGEMENT/SOCIAL WORK - NSDCPEFALRISK_GEN_ALL_CORE
For information on Fall & Injury Prevention, visit: [email or web address] OR  [email or web address] OR  https://www.cdc.gov/steadi/patient.html

## 2022-06-16 NOTE — DISCHARGE NOTE NURSING/CASE MANAGEMENT/SOCIAL WORK - PATIENT PORTAL LINK FT
You can access the FollowMyHealth Patient Portal offered by [facility] by registering at the following website: [email or web address]. By joining [facility]’s FollowMyHealth portal, you will also be able to view your health information using other applications (apps) compatible with our system.

## 2022-06-17 ENCOUNTER — TRANSCRIPTION ENCOUNTER (OUTPATIENT)
Age: 87
End: 2022-06-17

## 2022-06-17 LAB
ANION GAP SERPL CALC-SCNC: 9 MMOL/L — SIGNIFICANT CHANGE UP (ref 5–17)
BUN SERPL-MCNC: 21.7 MG/DL — HIGH (ref 8–20)
CALCIUM SERPL-MCNC: 8.2 MG/DL — LOW (ref 8.6–10.2)
CHLORIDE SERPL-SCNC: 107 MMOL/L — SIGNIFICANT CHANGE UP (ref 98–107)
CO2 SERPL-SCNC: 22 MMOL/L — SIGNIFICANT CHANGE UP (ref 22–29)
CREAT SERPL-MCNC: 1.21 MG/DL — SIGNIFICANT CHANGE UP (ref 0.5–1.3)
EGFR: 57 ML/MIN/1.73M2 — LOW
GLUCOSE SERPL-MCNC: 120 MG/DL — HIGH (ref 70–99)
HCT VFR BLD CALC: 42.7 % — SIGNIFICANT CHANGE UP (ref 39–50)
HGB BLD-MCNC: 13.7 G/DL — SIGNIFICANT CHANGE UP (ref 13–17)
MAGNESIUM SERPL-MCNC: 2.1 MG/DL — SIGNIFICANT CHANGE UP (ref 1.6–2.6)
MCHC RBC-ENTMCNC: 30.7 PG — SIGNIFICANT CHANGE UP (ref 27–34)
MCHC RBC-ENTMCNC: 32.1 GM/DL — SIGNIFICANT CHANGE UP (ref 32–36)
MCV RBC AUTO: 95.7 FL — SIGNIFICANT CHANGE UP (ref 80–100)
PLATELET # BLD AUTO: 121 K/UL — LOW (ref 150–400)
POTASSIUM SERPL-MCNC: 3.8 MMOL/L — SIGNIFICANT CHANGE UP (ref 3.5–5.3)
POTASSIUM SERPL-SCNC: 3.8 MMOL/L — SIGNIFICANT CHANGE UP (ref 3.5–5.3)
RBC # BLD: 4.46 M/UL — SIGNIFICANT CHANGE UP (ref 4.2–5.8)
RBC # FLD: 15.3 % — HIGH (ref 10.3–14.5)
SODIUM SERPL-SCNC: 138 MMOL/L — SIGNIFICANT CHANGE UP (ref 135–145)
WBC # BLD: 8.17 K/UL — SIGNIFICANT CHANGE UP (ref 3.8–10.5)
WBC # FLD AUTO: 8.17 K/UL — SIGNIFICANT CHANGE UP (ref 3.8–10.5)

## 2022-06-17 PROCEDURE — 99232 SBSQ HOSP IP/OBS MODERATE 35: CPT

## 2022-06-17 RX ORDER — ALBUTEROL 90 UG/1
2 AEROSOL, METERED ORAL
Qty: 0 | Refills: 0 | DISCHARGE
Start: 2022-06-17

## 2022-06-17 RX ORDER — CEFEPIME 1 G/1
2 INJECTION, POWDER, FOR SOLUTION INTRAMUSCULAR; INTRAVENOUS
Qty: 0 | Refills: 0 | DISCHARGE
Start: 2022-06-17 | End: 2022-06-20

## 2022-06-17 RX ADMIN — MIRTAZAPINE 7.5 MILLIGRAM(S): 45 TABLET, ORALLY DISINTEGRATING ORAL at 21:07

## 2022-06-17 RX ADMIN — CEFEPIME 100 MILLIGRAM(S): 1 INJECTION, POWDER, FOR SOLUTION INTRAMUSCULAR; INTRAVENOUS at 05:04

## 2022-06-17 RX ADMIN — Medication 100 MILLIGRAM(S): at 21:06

## 2022-06-17 RX ADMIN — DONEPEZIL HYDROCHLORIDE 10 MILLIGRAM(S): 10 TABLET, FILM COATED ORAL at 21:07

## 2022-06-17 RX ADMIN — Medication 60 MILLIGRAM(S): at 23:24

## 2022-06-17 RX ADMIN — CEFEPIME 100 MILLIGRAM(S): 1 INJECTION, POWDER, FOR SOLUTION INTRAMUSCULAR; INTRAVENOUS at 21:06

## 2022-06-17 RX ADMIN — Medication 50 MILLIGRAM(S): at 05:04

## 2022-06-17 RX ADMIN — Medication 60 MILLIGRAM(S): at 05:04

## 2022-06-17 RX ADMIN — ATORVASTATIN CALCIUM 20 MILLIGRAM(S): 80 TABLET, FILM COATED ORAL at 21:06

## 2022-06-17 RX ADMIN — Medication 60 MILLIGRAM(S): at 16:50

## 2022-06-17 RX ADMIN — Medication 60 MILLIGRAM(S): at 11:29

## 2022-06-17 RX ADMIN — Medication 5 MILLIGRAM(S): at 05:04

## 2022-06-17 RX ADMIN — Medication 75 MILLIGRAM(S): at 11:29

## 2022-06-17 RX ADMIN — CEFEPIME 100 MILLIGRAM(S): 1 INJECTION, POWDER, FOR SOLUTION INTRAMUSCULAR; INTRAVENOUS at 11:30

## 2022-06-17 RX ADMIN — RIVAROXABAN 10 MILLIGRAM(S): KIT at 11:29

## 2022-06-17 NOTE — DISCHARGE NOTE PROVIDER - HOSPITAL COURSE
89 y/o M w/ PMH of MDD, dementia, hard of hearing, HLD, HTN, prostate CA, recent admission for UTI and COVID was BIBA from [facility] for worsening hypoxia.  Initial w/u revealed RUL PNA that failed PO Levaquin as outpt.  pt admitted w/ acute hypoxic respiratory failure 2/2 HAP vs aspiration PNA.  RVP/COVID and MRSA PCR (-).  CTA chest significant for patch RUL opacity but (-) for PE.  Placed on Cefepime given PCN allergy and recent hospitalization.  Unable to fully titrate off supplemental O2.  Blood cultures NGTD.  Pt is on Xeralto for VTE ppx from recent COVID infection.  Hypokalemia was supplemented and resolved.  Pt noted to have urine cx w/ enterococcus and pseudomonas however Ucx w/ 10-49k of enterococcus and pseudomonas likely 2/2 recent UTI tx. On Cefepime for PNA though 06/20.  Pt will go to [facility] to wean off O2 and complete remainder of IV abx course.           88 year old male with history of Depression, Dementia, Hard of Hearing, Prostate Cancer, Recent UTI, HTN, HLD and COVID-19 presented from [facility] with worsening hypoxia. Recently diagnosed with pneumonia, failed outpatient Levaquin. Admitted with Acute Respiratory Failure with Hypoxia secondary to HCAP (suspected gram negative rods). He was placed on supplemental oxygen and was started on Cefepime. His symptoms started improving and he was weaned off from supplemental oxygen. His urine culture grew Enterococcus and Pseudomonas ([id number] CFU/ml) -- unclear significance. Urine clear.   He is feeling much better and is stable for discharge back to [facility].

## 2022-06-17 NOTE — DISCHARGE NOTE PROVIDER - ATTENDING DISCHARGE PHYSICAL EXAMINATION:
Vital Signs Last 24 Hrs  T(C): 36.3 (17 Jun 2022 10:34), Max: 37.4 (16 Jun 2022 17:20)  T(F): 97.4 (17 Jun 2022 10:34), Max: 99.3 (16 Jun 2022 17:20)  HR: 72 (17 Jun 2022 10:34) (63 - 73)  BP: 124/81 (17 Jun 2022 10:34) (124/81 - 149/85)  BP(mean): --  RR: 16 (17 Jun 2022 10:34) (16 - 18)  SpO2: 97% (17 Jun 2022 10:34) (93% - 97%)    GENERAL: pt examined bedside, laying comfortably in bed in NAD  HEENT: NC/AT, dry oral mucosa, clear conjunctiva, sclera nonicteric  RESPIRATORY: poor inspiratory effort, no wheezing, rhonchi, rales  CARDIOVASCULAR: RRR, normal S1 and S2  ABDOMEN: soft, NT/ND, normoactive bowel sounds, no rebound/guarding  EXTREMITIES: No cynaosis, no clubbing, no lower extremity edema, pulses are 2+ bilaterally  NEUROLOGY: A+O x2 no focal neurologic deficits appreciated   SKIN: scattered echymosis, small skin tear to rt elbow Vital Signs   T(C): 36.7 (21 Jun 2022 11:21), Max: 36.7 (20 Jun 2022 17:30)  T(F): 98.1 (21 Jun 2022 11:21), Max: 98.1 (21 Jun 2022 11:21)  HR: 63 (21 Jun 2022 11:21) (63 - 71)  BP: 142/74 (21 Jun 2022 11:21) (121/58 - 150/78)  RR: 18 (21 Jun 2022 11:21) (18 - 20)  SpO2: 94% (21 Jun 2022 11:21) (94% - 96%)  General: Elderly male lying in bed comfortably. No acute distress  HEENT: EOMI. Clear conjunctivae. Moist mucus membrane  Neck: Supple.   Chest: Good air entry. No wheezing, rales or rhonchi.   Heart: Normal S1 & S2. RRR.  Abdomen: Soft. Non-tender. Non-distended. + BS  Ext: No pedal edema. No calf tenderness   Neuro: Awake and alert. Moves all four extremities. No speech disorder  Skin: Warm and Dry  Psychiatry: Normal mood and affect Vital Signs   T(C): 36.7 (22 Jun 2022 06:00), Max: 36.9 (21 Jun 2022 16:15)  T(F): 98 (22 Jun 2022 06:00), Max: 98.4 (21 Jun 2022 16:15)  HR: 79 (22 Jun 2022 06:00) (63 - 80)  BP: 135/74 (22 Jun 2022 06:00) (133/75 - 144/78)  BP(mean): 98 (22 Jun 2022 06:00) (98 - 100)  RR: 18 (22 Jun 2022 06:00) (18 - 18)  SpO2: 98% (22 Jun 2022 06:00) (93% - 98%)  General: Elderly male lying in bed comfortably. No acute distress  HEENT: EOMI. Clear conjunctivae. Moist mucus membrane  Neck: Supple.   Chest: Good air entry. No wheezing, rales or rhonchi.   Heart: Normal S1 & S2. RRR.  Abdomen: Soft. Non-tender. Non-distended. + BS  Ext: No pedal edema. No calf tenderness   Neuro: Awake and alert. Moves all four extremities. No speech disorder  Skin: Warm and Dry  Psychiatry: Normal mood and affect

## 2022-06-17 NOTE — DISCHARGE NOTE PROVIDER - CARE PROVIDER_API CALL
Lane Maier)  Critical Care Medicine; Pulmonary Disease; Sleep Medicine  [address] [facility], [address]  [place] [address]  Phone: [phone]  Fax: [phone]  Scheduled Appointment: 06/28/2022 11:00 AM    Bello Roe)  Medicine  [address] [place] [address]  Phone: [phone]  Fax: [phone]  Established Patient  Follow Up Time: 1 week

## 2022-06-17 NOTE — DISCHARGE NOTE PROVIDER - NSDCMRMEDTOKEN_GEN_ALL_CORE_FT
albuterol 90 mcg/inh inhalation aerosol: 2 puff(s) inhaled 4 times a day, As needed, Shortness of Breath and/or Wheezing  atorvastatin 20 mg oral tablet: 1 tab(s) orally once a day  Cardizem 60 mg oral tablet: 1 tab(s) orally 4 times a day  cefepime 2 g intravenous injection: 2 gram(s) intravenous every 8 hours  donepezil 10 mg oral tablet: 1 tab(s) orally once a day (at bedtime)  Gemtesa 75 mg oral tablet: 1 tab(s) orally once a day  Metoprolol Succinate ER 50 mg oral tablet, extended release: 1 tab(s) orally once a day  mirtazapine 7.5 mg oral tablet: 1 tab(s) orally once a day (at bedtime)  predniSONE 5 mg oral tablet: 1 tab(s) orally once a day  traZODone 100 mg oral tablet: 1 tab(s) orally once a day (at bedtime)  venlafaxine 75 mg oral capsule, extended release: 1 cap(s) orally once a day  Xarelto 10 mg oral tablet: 1 tab(s) orally once a day End date June 29   albuterol 90 mcg/inh inhalation aerosol: 2 puff(s) inhaled 4 times a day, As needed, Shortness of Breath and/or Wheezing  atorvastatin 20 mg oral tablet: 1 tab(s) orally once a day  Cardizem 60 mg oral tablet: 1 tab(s) orally 4 times a day  donepezil 10 mg oral tablet: 1 tab(s) orally once a day (at bedtime)  Gemtesa 75 mg oral tablet: 1 tab(s) orally once a day  Metoprolol Succinate ER 50 mg oral tablet, extended release: 1 tab(s) orally once a day  mirtazapine 7.5 mg oral tablet: 1 tab(s) orally once a day (at bedtime)  predniSONE 5 mg oral tablet: 1 tab(s) orally once a day  traZODone 100 mg oral tablet: 1 tab(s) orally once a day (at bedtime)  venlafaxine 75 mg oral capsule, extended release: 1 cap(s) orally once a day  Xarelto 10 mg oral tablet: 1 tab(s) orally once a day End date June 29   albuterol 90 mcg/inh inhalation aerosol: 2 puff(s) inhaled 4 times a day, As needed, Shortness of Breath and/or Wheezing  atorvastatin 20 mg oral tablet: 1 tab(s) orally once a day  Cardizem 60 mg oral tablet: 1 tab(s) orally 4 times a day  donepezil 10 mg oral tablet: 1 tab(s) orally once a day (at bedtime)  Gemtesa 75 mg oral tablet: 1 tab(s) orally once a day  Metoprolol Succinate ER 50 mg oral tablet, extended release: 1 tab(s) orally once a day  mirtazapine 7.5 mg oral tablet: 1 tab(s) orally once a day (at bedtime)  oxybutynin 5 mg oral tablet: 1 tab(s) orally once a day (at bedtime)  predniSONE 5 mg oral tablet: 1 tab(s) orally once a day  traZODone 100 mg oral tablet: 1 tab(s) orally once a day (at bedtime)  venlafaxine 75 mg oral capsule, extended release: 1 cap(s) orally once a day  Xarelto 10 mg oral tablet: 1 tab(s) orally once a day End date June 29

## 2022-06-17 NOTE — DISCHARGE NOTE PROVIDER - CARE PROVIDERS DIRECT ADDRESSES
[email or web address]

## 2022-06-17 NOTE — DISCHARGE NOTE PROVIDER - DETAILS OF MALNUTRITION DIAGNOSIS/DIAGNOSES
This patient has been assessed with a concern for Malnutrition and was treated during this hospitalization for the following Nutrition diagnosis/diagnoses:     -  06/21/2022: Severe protein-calorie malnutrition

## 2022-06-17 NOTE — DISCHARGE NOTE PROVIDER - NSDCCPCAREPLAN_GEN_ALL_CORE_FT
PRINCIPAL DISCHARGE DIAGNOSIS  Diagnosis: Pneumonia  Assessment and Plan of Treatment: -On Cefapime through 06/20/22  -Maintain on aspiration precautions   -Follow up with PMD in 1 week         SECONDARY DISCHARGE DIAGNOSES  Diagnosis: Acute respiratory failure with hypoxia  Assessment and Plan of Treatment: -Due to PNA and deconditioning  -Still requiring supplemental O2.  Continue to wean off as tolerated.      Diagnosis: Urinary tract infection  Assessment and Plan of Treatment: Evidence of partially treated UTI.  On cefapime for PNA which will cover organisms on urine culture.     PRINCIPAL DISCHARGE DIAGNOSIS  Diagnosis: Acute respiratory failure with hypoxia  Assessment and Plan of Treatment: secondary to HCAP.   Finished antibiotics.   Weaned off from supplemental oxygen.   Follow up with PMD in 1 week.

## 2022-06-17 NOTE — DISCHARGE NOTE PROVIDER - NSDCFUADDAPPT_GEN_ALL_CORE_FT
***STAR PNEUMONIA*** A follow-up Pulmonologist appointment has been scheduled for 6/28/2022 at 11:00am with Dr. Lane Maier, [address] [place] [address]. If you are unable to attend this appointment, please call their office directly at [phone] to reschedule. [facility] facility to manage patient's medications upon discharge.

## 2022-06-17 NOTE — DISCHARGE NOTE PROVIDER - NSDCFUSCHEDAPPT_GEN_ALL_CORE_FT
Lane Maier  [facility]  PULMMED [address]  Scheduled Appointment: 06/28/2022

## 2022-06-17 NOTE — DISCHARGE NOTE PROVIDER - PROVIDER TOKENS
PROVIDER:[TOKEN:[[id number]],SCHEDULEDAPPT:[06/28/2022],SCHEDULEDAPPTTIME:[11:00 AM]],PROVIDER:[TOKEN:[[id number]],FOLLOWUP:[1 week],ESTABLISHEDPATIENT:[T]]

## 2022-06-18 LAB
ANION GAP SERPL CALC-SCNC: 7 MMOL/L — SIGNIFICANT CHANGE UP (ref 5–17)
BUN SERPL-MCNC: 26.8 MG/DL — HIGH (ref 8–20)
CALCIUM SERPL-MCNC: 8.2 MG/DL — LOW (ref 8.6–10.2)
CHLORIDE SERPL-SCNC: 110 MMOL/L — HIGH (ref 98–107)
CO2 SERPL-SCNC: 24 MMOL/L — SIGNIFICANT CHANGE UP (ref 22–29)
CREAT SERPL-MCNC: 1.01 MG/DL — SIGNIFICANT CHANGE UP (ref 0.5–1.3)
EGFR: 71 ML/MIN/1.73M2 — SIGNIFICANT CHANGE UP
GLUCOSE SERPL-MCNC: 100 MG/DL — HIGH (ref 70–99)
HCT VFR BLD CALC: 39.5 % — SIGNIFICANT CHANGE UP (ref 39–50)
HGB BLD-MCNC: 12.9 G/DL — LOW (ref 13–17)
MCHC RBC-ENTMCNC: 31.9 PG — SIGNIFICANT CHANGE UP (ref 27–34)
MCHC RBC-ENTMCNC: 32.7 GM/DL — SIGNIFICANT CHANGE UP (ref 32–36)
MCV RBC AUTO: 97.5 FL — SIGNIFICANT CHANGE UP (ref 80–100)
PLATELET # BLD AUTO: 104 K/UL — LOW (ref 150–400)
POTASSIUM SERPL-MCNC: 3.9 MMOL/L — SIGNIFICANT CHANGE UP (ref 3.5–5.3)
POTASSIUM SERPL-SCNC: 3.9 MMOL/L — SIGNIFICANT CHANGE UP (ref 3.5–5.3)
RBC # BLD: 4.05 M/UL — LOW (ref 4.2–5.8)
RBC # FLD: 15.5 % — HIGH (ref 10.3–14.5)
SODIUM SERPL-SCNC: 141 MMOL/L — SIGNIFICANT CHANGE UP (ref 135–145)
WBC # BLD: 6.95 K/UL — SIGNIFICANT CHANGE UP (ref 3.8–10.5)
WBC # FLD AUTO: 6.95 K/UL — SIGNIFICANT CHANGE UP (ref 3.8–10.5)

## 2022-06-18 PROCEDURE — 99232 SBSQ HOSP IP/OBS MODERATE 35: CPT

## 2022-06-18 RX ORDER — OXYBUTYNIN CHLORIDE 5 MG
5 TABLET ORAL AT BEDTIME
Refills: 0 | Status: DISCONTINUED | OUTPATIENT
Start: 2022-06-18 | End: 2022-06-22

## 2022-06-18 RX ADMIN — CEFEPIME 100 MILLIGRAM(S): 1 INJECTION, POWDER, FOR SOLUTION INTRAMUSCULAR; INTRAVENOUS at 05:05

## 2022-06-18 RX ADMIN — Medication 60 MILLIGRAM(S): at 18:36

## 2022-06-18 RX ADMIN — RIVAROXABAN 10 MILLIGRAM(S): KIT at 11:42

## 2022-06-18 RX ADMIN — Medication 50 MILLIGRAM(S): at 05:05

## 2022-06-18 RX ADMIN — Medication 5 MILLIGRAM(S): at 22:31

## 2022-06-18 RX ADMIN — Medication 5 MILLIGRAM(S): at 05:05

## 2022-06-18 RX ADMIN — MIRTAZAPINE 7.5 MILLIGRAM(S): 45 TABLET, ORALLY DISINTEGRATING ORAL at 22:30

## 2022-06-18 RX ADMIN — Medication 60 MILLIGRAM(S): at 11:42

## 2022-06-18 RX ADMIN — Medication 3 MILLIGRAM(S): at 22:31

## 2022-06-18 RX ADMIN — Medication 100 MILLIGRAM(S): at 22:30

## 2022-06-18 RX ADMIN — ATORVASTATIN CALCIUM 20 MILLIGRAM(S): 80 TABLET, FILM COATED ORAL at 22:30

## 2022-06-18 RX ADMIN — CEFEPIME 100 MILLIGRAM(S): 1 INJECTION, POWDER, FOR SOLUTION INTRAMUSCULAR; INTRAVENOUS at 11:42

## 2022-06-18 RX ADMIN — DONEPEZIL HYDROCHLORIDE 10 MILLIGRAM(S): 10 TABLET, FILM COATED ORAL at 22:30

## 2022-06-18 RX ADMIN — Medication 60 MILLIGRAM(S): at 05:05

## 2022-06-18 RX ADMIN — CEFEPIME 100 MILLIGRAM(S): 1 INJECTION, POWDER, FOR SOLUTION INTRAMUSCULAR; INTRAVENOUS at 22:31

## 2022-06-18 RX ADMIN — Medication 75 MILLIGRAM(S): at 18:36

## 2022-06-19 LAB
CULTURE RESULTS: SIGNIFICANT CHANGE UP
CULTURE RESULTS: SIGNIFICANT CHANGE UP
SPECIMEN SOURCE: SIGNIFICANT CHANGE UP
SPECIMEN SOURCE: SIGNIFICANT CHANGE UP

## 2022-06-19 PROCEDURE — 99232 SBSQ HOSP IP/OBS MODERATE 35: CPT

## 2022-06-19 RX ADMIN — Medication 60 MILLIGRAM(S): at 23:56

## 2022-06-19 RX ADMIN — Medication 60 MILLIGRAM(S): at 12:18

## 2022-06-19 RX ADMIN — Medication 75 MILLIGRAM(S): at 12:18

## 2022-06-19 RX ADMIN — Medication 5 MILLIGRAM(S): at 21:48

## 2022-06-19 RX ADMIN — CEFEPIME 100 MILLIGRAM(S): 1 INJECTION, POWDER, FOR SOLUTION INTRAMUSCULAR; INTRAVENOUS at 05:36

## 2022-06-19 RX ADMIN — Medication 50 MILLIGRAM(S): at 05:38

## 2022-06-19 RX ADMIN — Medication 60 MILLIGRAM(S): at 00:19

## 2022-06-19 RX ADMIN — RIVAROXABAN 10 MILLIGRAM(S): KIT at 12:18

## 2022-06-19 RX ADMIN — MIRTAZAPINE 7.5 MILLIGRAM(S): 45 TABLET, ORALLY DISINTEGRATING ORAL at 21:48

## 2022-06-19 RX ADMIN — CEFEPIME 100 MILLIGRAM(S): 1 INJECTION, POWDER, FOR SOLUTION INTRAMUSCULAR; INTRAVENOUS at 21:48

## 2022-06-19 RX ADMIN — Medication 100 MILLIGRAM(S): at 21:48

## 2022-06-19 RX ADMIN — CEFEPIME 100 MILLIGRAM(S): 1 INJECTION, POWDER, FOR SOLUTION INTRAMUSCULAR; INTRAVENOUS at 15:28

## 2022-06-19 RX ADMIN — ATORVASTATIN CALCIUM 20 MILLIGRAM(S): 80 TABLET, FILM COATED ORAL at 21:48

## 2022-06-19 RX ADMIN — DONEPEZIL HYDROCHLORIDE 10 MILLIGRAM(S): 10 TABLET, FILM COATED ORAL at 21:48

## 2022-06-19 RX ADMIN — Medication 60 MILLIGRAM(S): at 18:57

## 2022-06-19 RX ADMIN — Medication 60 MILLIGRAM(S): at 05:36

## 2022-06-19 RX ADMIN — Medication 5 MILLIGRAM(S): at 05:38

## 2022-06-20 LAB — SARS-COV-2 RNA SPEC QL NAA+PROBE: DETECTED

## 2022-06-20 PROCEDURE — 99232 SBSQ HOSP IP/OBS MODERATE 35: CPT

## 2022-06-20 PROCEDURE — 36000 PLACE NEEDLE IN VEIN: CPT

## 2022-06-20 RX ADMIN — Medication 75 MILLIGRAM(S): at 12:43

## 2022-06-20 RX ADMIN — CEFEPIME 100 MILLIGRAM(S): 1 INJECTION, POWDER, FOR SOLUTION INTRAMUSCULAR; INTRAVENOUS at 12:45

## 2022-06-20 RX ADMIN — ATORVASTATIN CALCIUM 20 MILLIGRAM(S): 80 TABLET, FILM COATED ORAL at 21:53

## 2022-06-20 RX ADMIN — Medication 60 MILLIGRAM(S): at 17:47

## 2022-06-20 RX ADMIN — DONEPEZIL HYDROCHLORIDE 10 MILLIGRAM(S): 10 TABLET, FILM COATED ORAL at 21:53

## 2022-06-20 RX ADMIN — Medication 5 MILLIGRAM(S): at 05:26

## 2022-06-20 RX ADMIN — Medication 50 MILLIGRAM(S): at 05:26

## 2022-06-20 RX ADMIN — MIRTAZAPINE 7.5 MILLIGRAM(S): 45 TABLET, ORALLY DISINTEGRATING ORAL at 21:53

## 2022-06-20 RX ADMIN — CEFEPIME 100 MILLIGRAM(S): 1 INJECTION, POWDER, FOR SOLUTION INTRAMUSCULAR; INTRAVENOUS at 05:27

## 2022-06-20 RX ADMIN — Medication 60 MILLIGRAM(S): at 05:26

## 2022-06-20 RX ADMIN — Medication 60 MILLIGRAM(S): at 23:16

## 2022-06-20 RX ADMIN — CEFEPIME 100 MILLIGRAM(S): 1 INJECTION, POWDER, FOR SOLUTION INTRAMUSCULAR; INTRAVENOUS at 21:53

## 2022-06-20 RX ADMIN — Medication 100 MILLIGRAM(S): at 21:53

## 2022-06-20 RX ADMIN — Medication 5 MILLIGRAM(S): at 21:53

## 2022-06-20 RX ADMIN — RIVAROXABAN 10 MILLIGRAM(S): KIT at 12:44

## 2022-06-20 RX ADMIN — Medication 60 MILLIGRAM(S): at 12:43

## 2022-06-21 PROCEDURE — 99497 ADVNCD CARE PLAN 30 MIN: CPT

## 2022-06-21 PROCEDURE — 99232 SBSQ HOSP IP/OBS MODERATE 35: CPT

## 2022-06-21 RX ORDER — OXYBUTYNIN CHLORIDE 5 MG
1 TABLET ORAL
Qty: 30 | Refills: 0
Start: 2022-06-21 | End: 2022-07-20

## 2022-06-21 RX ORDER — ASCORBIC ACID 60 MG
500 TABLET,CHEWABLE ORAL DAILY
Refills: 0 | Status: DISCONTINUED | OUTPATIENT
Start: 2022-06-21 | End: 2022-06-22

## 2022-06-21 RX ORDER — ALBUTEROL 90 UG/1
2 AEROSOL, METERED ORAL
Qty: 1 | Refills: 0
Start: 2022-06-21 | End: 2022-07-20

## 2022-06-21 RX ORDER — OXYBUTYNIN CHLORIDE 5 MG
1 TABLET ORAL
Qty: 0 | Refills: 0 | DISCHARGE
Start: 2022-06-21

## 2022-06-21 RX ADMIN — Medication 60 MILLIGRAM(S): at 17:07

## 2022-06-21 RX ADMIN — Medication 60 MILLIGRAM(S): at 22:39

## 2022-06-21 RX ADMIN — MIRTAZAPINE 7.5 MILLIGRAM(S): 45 TABLET, ORALLY DISINTEGRATING ORAL at 22:39

## 2022-06-21 RX ADMIN — Medication 100 MILLIGRAM(S): at 22:39

## 2022-06-21 RX ADMIN — Medication 5 MILLIGRAM(S): at 06:18

## 2022-06-21 RX ADMIN — ATORVASTATIN CALCIUM 20 MILLIGRAM(S): 80 TABLET, FILM COATED ORAL at 22:38

## 2022-06-21 RX ADMIN — Medication 60 MILLIGRAM(S): at 11:59

## 2022-06-21 RX ADMIN — Medication 60 MILLIGRAM(S): at 06:17

## 2022-06-21 RX ADMIN — RIVAROXABAN 10 MILLIGRAM(S): KIT at 11:59

## 2022-06-21 RX ADMIN — DONEPEZIL HYDROCHLORIDE 10 MILLIGRAM(S): 10 TABLET, FILM COATED ORAL at 22:39

## 2022-06-21 RX ADMIN — Medication 50 MILLIGRAM(S): at 06:18

## 2022-06-21 RX ADMIN — CEFEPIME 100 MILLIGRAM(S): 1 INJECTION, POWDER, FOR SOLUTION INTRAMUSCULAR; INTRAVENOUS at 06:17

## 2022-06-21 RX ADMIN — Medication 75 MILLIGRAM(S): at 11:59

## 2022-06-21 RX ADMIN — Medication 5 MILLIGRAM(S): at 22:38

## 2022-06-21 NOTE — DIETITIAN INITIAL EVALUATION ADULT - NS FNS DIET ORDER
Diet, DASH/TLC:   Sodium & Cholesterol Restricted  Supplement Feeding Modality:  Oral  Ensure Enlive Cans or Servings Per Day:  1       Frequency:  Three Times a day (06-19-22 @ 14:36)

## 2022-06-21 NOTE — PROGRESS NOTE ADULT - SUBJECTIVE AND OBJECTIVE BOX
INFECTIOUS DISEASES AND INTERNAL MEDICINE at [place]  =======================================================  Jack Hooper MD  Diplomates American Board of Internal Medicine and Infectious Diseases  Telephone [phone]  Fax            [phone]  =======================================================    DOMONIQUE MAST [id number]    Follow up:    Allergies:  penicillin (Hives)      Medications:  acetaminophen     Tablet .. 650 milliGRAM(s) Oral every 6 hours PRN  ALBUTerol    90 MICROgram(s) HFA Inhaler 2 Puff(s) Inhalation four times a day PRN  aluminum hydroxide/magnesium hydroxide/simethicone Suspension 30 milliLiter(s) Oral every 4 hours PRN  atorvastatin 20 milliGRAM(s) Oral at bedtime  cefepime   IVPB 2000 milliGRAM(s) IV Intermittent every 8 hours  diltiazem    Tablet 60 milliGRAM(s) Oral four times a day  donepezil 10 milliGRAM(s) Oral at bedtime  melatonin 3 milliGRAM(s) Oral at bedtime PRN  metoprolol succinate ER 50 milliGRAM(s) Oral daily  mirtazapine 7.5 milliGRAM(s) Oral at bedtime  ondansetron Injectable 4 milliGRAM(s) IV Push every 8 hours PRN  predniSONE   Tablet 5 milliGRAM(s) Oral daily  rivaroxaban 10 milliGRAM(s) Oral daily  traZODone 100 milliGRAM(s) Oral at bedtime  venlafaxine XR. 75 milliGRAM(s) Oral daily    SOCIAL       FAMILY   FAMILY HISTORY:    REVIEW OF SYSTEMS:  CONfused unable  ot obtain            Physical Exam:  ICU Vital Signs Last 24 Hrs  T(C): 36.7 (16 Jun 2022 11:14), Max: 36.8 (15 Robby 2022 23:00)  T(F): 98.1 (16 Jun 2022 11:14), Max: 98.3 (16 Jun 2022 05:09)  HR: 75 (16 Jun 2022 11:14) (60 - 83)  BP: 128/69 (16 Jun 2022 11:14) (128/69 - 165/83)  BP(mean): --  ABP: --  ABP(mean): --  RR: 18 (16 Jun 2022 11:14) (18 - 18)  SpO2: 97% (16 Jun 2022 11:14) (95% - 98%)    GEN: NAD,   HEENT: normocephalic and atraumatic. EOMI. RUKHSANA.    NECK: Supple. No carotid bruits.  No lymphadenopathy or thyromegaly.  LUNGS: Clear to auscultation.  HEART: Regular rate and rhythm without murmur.  ABDOMEN: Soft, nontender, and nondistended.  Positive bowel sounds.    : No CVA tenderness  EXTREMITIES: Without any cyanosis, clubbing, rash, lesions or edema.  MSK: no joint swelling  NEUROLOGIC confused     Labs:  Vitals:  ============  T(F): 98.1 (16 Jun 2022 11:14), Max: 98.3 (16 Jun 2022 05:09)  HR: 75 (16 Jun 2022 11:14)  BP: 128/69 (16 Jun 2022 11:14)  RR: 18 (16 Jun 2022 11:14)  SpO2: 97% (16 Jun 2022 11:14) (95% - 98%)  temp max in last 48H T(F): , Max: 98.4 (06-14-22 @ 16:08)    =======================================================  Current Antibiotics:  cefepime   IVPB 2000 milliGRAM(s) IV Intermittent every 8 hours    Other medications:  atorvastatin 20 milliGRAM(s) Oral at bedtime  diltiazem    Tablet 60 milliGRAM(s) Oral four times a day  donepezil 10 milliGRAM(s) Oral at bedtime  metoprolol succinate ER 50 milliGRAM(s) Oral daily  mirtazapine 7.5 milliGRAM(s) Oral at bedtime  predniSONE   Tablet 5 milliGRAM(s) Oral daily  rivaroxaban 10 milliGRAM(s) Oral daily  traZODone 100 milliGRAM(s) Oral at bedtime  venlafaxine XR. 75 milliGRAM(s) Oral daily      =======================================================  Labs:                        13.3   7.76  )-----------( 124      ( 16 Jun 2022 07:41 )             39.9     06-16    140  |  112<H>  |  21.2<H>  ----------------------------<  108<H>  3.4<L>   |  22.0  |  1.16    Ca    7.8<L>      16 Jun 2022 07:41  Phos  2.6     06-16  Mg     1.9     06-16    TPro  5.5<L>  /  Alb  2.8<L>  /  TBili  1.2  /  DBili  x   /  AST  14  /  ALT  11  /  AlkPhos  83  06-14      Culture - Blood (collected 06-14-22 @ 19:12)  Source: .Blood Blood-Peripheral    Culture - Blood (collected 06-14-22 @ 19:12)  Source: .Blood Blood-Peripheral    Culture - Urine (collected 06-12-22 @ 10:45)  Source: Clean Catch Clean Catch (Midstream)  Organism: Pseudomonas aeruginosa  Enterococcus faecalis (06-15-22 @ 13:35)  Organism: Enterococcus faecalis (06-15-22 @ 13:35)    Sensitivities:      -  Ampicillin: S <=2 Predicts results to ampicillin/sulbactam, amoxacillin-clavulanate and  piperacillin-tazobactam.      -  Ciprofloxacin: S <=1      -  Levofloxacin: S <=1      -  Nitrofurantoin: S <=32 Should not be used to treat pyelonephritis.      -  Tetra/Doxy: R >8      -  Vancomycin: S 2      Method Type: HERVE  Organism: Pseudomonas aeruginosa (06-14-22 @ 08:26)    Sensitivities:      -  Amikacin: S <=16      -  Aztreonam: S <=4      -  Cefepime: S <=2      -  Ceftazidime: S 4      -  Ciprofloxacin: S <=0.25      -  Gentamicin: S 4      -  Imipenem: S <=1      -  Levofloxacin: S <=0.5      -  Meropenem: S <=1      -  Piperacillin/Tazobactam: S <=8      -  Tobramycin: S <=2      Method Type: HERVE      Creatinine, Serum: 1.16 mg/dL (06-16-22 @ 07:41)  Creatinine, Serum: 1.25 mg/dL (06-15-22 @ 07:05)  Creatinine, Serum: 1.14 mg/dL (06-14-22 @ 13:42)  Creatinine, Serum: 1.17 mg/dL (06-11-22 @ 23:20)        Ferritin, Serum: 150 ng/mL (05-31-22 @ 07:43)      WBC Count: 7.76 K/uL (06-16-22 @ 07:41)  WBC Count: 10.11 K/uL (06-15-22 @ 07:05)  WBC Count: 9.63 K/uL (06-14-22 @ 13:42)  WBC Count: 13.09 K/uL (06-11-22 @ 23:20)    SARS-CoV-2: NotDetec (06-14-22 @ 13:57)  SARS-CoV-2: Detected (06-11-22 @ 23:38)  COVID-19 PCR: Detected (06-02-22 @ 03:07)  SARS-CoV-2: Detected (05-27-22 @ 22:20)      Alkaline Phosphatase, Serum: 83 U/L (06-14-22 @ 13:42)  Alanine Aminotransferase (ALT/SGPT): 11 U/L (06-14-22 @ 13:42)  Aspartate Aminotransferase (AST/SGOT): 14 U/L (06-14-22 @ 13:42)  Bilirubin Total, Serum: 1.2 mg/dL (06-14-22 @ 13:42)  
Pneumonia due to organism    HPI:  87 y/o male with Hx of MDD, Dementia, HLD, HTN, prostate cancer, recent admission for UTI and COVID, p/w tachypnea and hypoxia. Patient demented, unknown why he is in the hospital. History obtained from chart review. Patient recently admitted for COVID, dc 2 weeks ago and completed Remdesivir and Decadron x 3 days. Came to the ED 2 days earlier with SOB, and was found to have RUL PNA on CTA, he was discharged back to [facility] with PO Levaquin. Noted this AM by [facility] staff to be continued tachypnea as well as mildly hypoxic on RA, and he was brought in for further care. Upon my examination, patient denies any concerns. Does not report any fever, chill, CP, SOB, and no abd pain, n/v/c/d, nor dysuria nor increased urinary frequency.     ED vitals and labs stable. Given failed PO Levaquin, admitted for further care.  (14 Jun 2022 15:48)    Interval History:  Patient was seen and examined at bedside around 9:30 am.  Doing well.   Denies chest pain, palpitations, shortness of breath, nausea, vomiting or abdominal pain.  Appetite is good.     ROS:  As per interval history otherwise unremarkable.    PHYSICAL EXAM:  Vital Signs   T(C): 36.5 (20 Jun 2022 11:54), Max: 37.1 (19 Jun 2022 22:55)  T(F): 97.7 (20 Jun 2022 11:54), Max: 98.8 (19 Jun 2022 22:55)  HR: 64 (20 Jun 2022 11:54) (63 - 69)  BP: 139/79 (20 Jun 2022 11:54) (132/75 - 153/88)  RR: 16 (20 Jun 2022 11:54) (16 - 20)  SpO2: 98% (20 Jun 2022 11:54) (92% - 98%)  General: Elderly male lying in bed comfortably. No acute distress  HEENT: EOMI. Clear conjunctivae. Moist mucus membrane  Neck: Supple.   Chest: Good air entry. No wheezing, rales or rhonchi.   Heart: Normal S1 & S2. RRR.  Abdomen: Soft. Non-tender. Non-distended. + BS  Ext: No pedal edema. No calf tenderness   Neuro: Awake and alert. Moves all four extremities. No speech disorder  Skin: Warm and Dry  Psychiatry: Normal mood and affect    I&O's Summary    19 Jun 2022 07:01  -  20 Jun 2022 07:00  --------------------------------------------------------  IN: 0 mL / OUT: 400 mL / NET: -400 mL    LABS:                        12.9   6.95  )-----------( 104      ( 18 Jun 2022 07:51 )             39.5     06-18    141  |  110<H>  |  26.8<H>  ----------------------------<  100<H>  3.9   |  24.0  |  1.01    Ca    8.2<L>      18 Jun 2022 07:51    Blood Culture: 06-14 @ 19:12  Organism --  Gram Stain Blood -- Gram Stain --  Specimen Source .Blood Blood-Peripheral  Culture-Blood --    RADIOLOGY & ADDITIONAL STUDIES:  Reviewed     MEDICATIONS  (STANDING):  atorvastatin 20 milliGRAM(s) Oral at bedtime  cefepime   IVPB 2000 milliGRAM(s) IV Intermittent every 8 hours  diltiazem    Tablet 60 milliGRAM(s) Oral four times a day  donepezil 10 milliGRAM(s) Oral at bedtime  metoprolol succinate ER 50 milliGRAM(s) Oral daily  mirtazapine 7.5 milliGRAM(s) Oral at bedtime  oxybutynin 5 milliGRAM(s) Oral at bedtime  predniSONE   Tablet 5 milliGRAM(s) Oral daily  rivaroxaban 10 milliGRAM(s) Oral daily  traZODone 100 milliGRAM(s) Oral at bedtime  venlafaxine XR. 75 milliGRAM(s) Oral daily    MEDICATIONS  (PRN):  acetaminophen     Tablet .. 650 milliGRAM(s) Oral every 6 hours PRN Temp greater or equal to 38C (100.4F), Mild Pain (1 - 3)  ALBUTerol    90 MICROgram(s) HFA Inhaler 2 Puff(s) Inhalation four times a day PRN Shortness of Breath and/or Wheezing  aluminum hydroxide/magnesium hydroxide/simethicone Suspension 30 milliLiter(s) Oral every 4 hours PRN Dyspepsia  melatonin 3 milliGRAM(s) Oral at bedtime PRN Insomnia  ondansetron Injectable 4 milliGRAM(s) IV Push every 8 hours PRN Nausea and/or Vomiting        
Pneumonia due to organism    HPI:  89 y/o male with Hx of MDD, Dementia, HLD, HTN, prostate cancer, recent admission for UTI and COVID, p/w tachypnea and hypoxia. Patient demented, unknown why he is in the hospital. History obtained from chart review. Patient recently admitted for COVID, dc 2 weeks ago and completed Remdesivir and Decadron x 3 days. Came to the ED 2 days earlier with SOB, and was found to have RUL PNA on CTA, he was discharged back to [facility] with PO Levaquin. Noted this AM by [facility] staff to be continued tachypnea as well as mildly hypoxic on RA, and he was brought in for further care. Upon my examination, patient denies any concerns. Does not report any fever, chill, CP, SOB, and no abd pain, n/v/c/d, nor dysuria nor increased urinary frequency.     ED vitals and labs stable. Given failed PO Levaquin, admitted for further care.  (14 Jun 2022 15:48)    Interval History:  Patient was seen and examined at bedside around 10:15 am.  Feels better.   Denies chest pain, palpitations, shortness of breath, nausea, vomiting or abdominal pain.    ROS:  As per interval history otherwise unremarkable.    PHYSICAL EXAM:  Vital Signs   T(C): 36.7 (21 Jun 2022 11:21), Max: 36.7 (20 Jun 2022 17:30)  T(F): 98.1 (21 Jun 2022 11:21), Max: 98.1 (21 Jun 2022 11:21)  HR: 63 (21 Jun 2022 11:21) (63 - 71)  BP: 142/74 (21 Jun 2022 11:21) (121/58 - 150/78)  RR: 18 (21 Jun 2022 11:21) (18 - 20)  SpO2: 94% (21 Jun 2022 11:21) (94% - 96%)  General: Elderly male lying in bed comfortably. No acute distress  HEENT: EOMI. Clear conjunctivae. Moist mucus membrane  Neck: Supple.   Chest: Good air entry. No wheezing, rales or rhonchi.   Heart: Normal S1 & S2. RRR.  Abdomen: Soft. Non-tender. Non-distended. + BS  Ext: No pedal edema. No calf tenderness   Neuro: Awake and alert. Moves all four extremities. No speech disorder  Skin: Warm and Dry  Psychiatry: Normal mood and affect    I&O's Summary    20 Jun 2022 07:01  -  21 Jun 2022 07:00  --------------------------------------------------------  IN: 360 mL / OUT: 400 mL / NET: -40 mL    LABS:                        12.9   6.95  )-----------( 104      ( 18 Jun 2022 07:51 )             39.5     06-18    141  |  110<H>  |  26.8<H>  ----------------------------<  100<H>  3.9   |  24.0  |  1.01    Ca    8.2<L>      18 Jun 2022 07:51    Blood Culture: 06-14 @ 19:12  Organism --  Gram Stain Blood -- Gram Stain --  Specimen Source .Blood Blood-Peripheral  Culture-Blood --    RADIOLOGY & ADDITIONAL STUDIES:  Reviewed     MEDICATIONS  (STANDING):  ascorbic acid 500 milliGRAM(s) Oral daily  atorvastatin 20 milliGRAM(s) Oral at bedtime  diltiazem    Tablet 60 milliGRAM(s) Oral four times a day  donepezil 10 milliGRAM(s) Oral at bedtime  metoprolol succinate ER 50 milliGRAM(s) Oral daily  mirtazapine 7.5 milliGRAM(s) Oral at bedtime  multivitamin 1 Tablet(s) Oral daily  oxybutynin 5 milliGRAM(s) Oral at bedtime  predniSONE   Tablet 5 milliGRAM(s) Oral daily  rivaroxaban 10 milliGRAM(s) Oral daily  traZODone 100 milliGRAM(s) Oral at bedtime  venlafaxine XR. 75 milliGRAM(s) Oral daily    MEDICATIONS  (PRN):  acetaminophen     Tablet .. 650 milliGRAM(s) Oral every 6 hours PRN Temp greater or equal to 38C (100.4F), Mild Pain (1 - 3)  ALBUTerol    90 MICROgram(s) HFA Inhaler 2 Puff(s) Inhalation four times a day PRN Shortness of Breath and/or Wheezing  aluminum hydroxide/magnesium hydroxide/simethicone Suspension 30 milliLiter(s) Oral every 4 hours PRN Dyspepsia  melatonin 3 milliGRAM(s) Oral at bedtime PRN Insomnia  ondansetron Injectable 4 milliGRAM(s) IV Push every 8 hours PRN Nausea and/or Vomiting          
Pneumonia due to organism    HPI:  89 y/o male with Hx of MDD, Dementia, HLD, HTN, prostate cancer, recent admission for UTI and COVID, p/w tachypnea and hypoxia. Patient demented, unknown why he is in the hospital. History obtained from chart review. Patient recently admitted for COVID, dc 2 weeks ago and completed Remdesivir and Decadron x 3 days. Came to the ED 2 days earlier with SOB, and was found to have RUL PNA on CTA, he was discharged back to [facility] with PO Levaquin. Noted this AM by [facility] staff to be continued tachypnea as well as mildly hypoxic on RA, and he was brought in for further care. Upon my examination, patient denies any concerns. Does not report any fever, chill, CP, SOB, and no abd pain, n/v/c/d, nor dysuria nor increased urinary frequency.     ED vitals and labs stable. Given failed PO Levaquin, admitted for further care.  (14 Jun 2022 15:48)    Interval History:  Patient was seen and examined at bedside around 10:45 am.  Feels better.  Denies chest pain, palpitations, shortness of breath, nausea, vomiting or abdominal pain.    ROS:  As per interval history otherwise unremarkable.    PHYSICAL EXAM:  Vital Signs   T(C): 36.5 (19 Jun 2022 10:25), Max: 37.1 (18 Jun 2022 17:03)  T(F): 97.7 (19 Jun 2022 10:25), Max: 98.7 (18 Jun 2022 17:03)  HR: 69 (19 Jun 2022 10:25) (68 - 79)  BP: 129/67 (19 Jun 2022 10:25) (128/79 - 138/79)  RR: 18 (19 Jun 2022 10:25) (17 - 18)  SpO2: 92% (19 Jun 2022 10:25) (92% - 98%)  General: Elderly male lying in bed comfortably. No acute distress  HEENT: EOMI. Clear conjunctivae. Moist mucus membrane  Neck: Supple.   Chest: Good air entry. No wheezing, rales or rhonchi.   Heart: Normal S1 & S2. RRR.  Abdomen: Soft. Non-tender. Non-distended. + BS  Ext: No pedal edema. No calf tenderness   Neuro: Awake and alert. Moves all four extremities. No speech disorder  Skin: Warm and Dry  Psychiatry: Normal mood and affect    I&O's Summary    18 Jun 2022 07:01  -  19 Jun 2022 07:00  --------------------------------------------------------  IN: 0 mL / OUT: 250 mL / NET: -250 mL    LABS:                        12.9   6.95  )-----------( 104      ( 18 Jun 2022 07:51 )             39.5     06-18    141  |  110<H>  |  26.8<H>  ----------------------------<  100<H>  3.9   |  24.0  |  1.01    Ca    8.2<L>      18 Jun 2022 07:51    Blood Culture: 06-14 @ 19:12  Organism --  Gram Stain Blood -- Gram Stain --  Specimen Source .Blood Blood-Peripheral  Culture-Blood --    RADIOLOGY & ADDITIONAL STUDIES:  Reviewed     MEDICATIONS  (STANDING):  atorvastatin 20 milliGRAM(s) Oral at bedtime  cefepime   IVPB 2000 milliGRAM(s) IV Intermittent every 8 hours  diltiazem    Tablet 60 milliGRAM(s) Oral four times a day  donepezil 10 milliGRAM(s) Oral at bedtime  metoprolol succinate ER 50 milliGRAM(s) Oral daily  mirtazapine 7.5 milliGRAM(s) Oral at bedtime  oxybutynin 5 milliGRAM(s) Oral at bedtime  predniSONE   Tablet 5 milliGRAM(s) Oral daily  rivaroxaban 10 milliGRAM(s) Oral daily  traZODone 100 milliGRAM(s) Oral at bedtime  venlafaxine XR. 75 milliGRAM(s) Oral daily    MEDICATIONS  (PRN):  acetaminophen     Tablet .. 650 milliGRAM(s) Oral every 6 hours PRN Temp greater or equal to 38C (100.4F), Mild Pain (1 - 3)  ALBUTerol    90 MICROgram(s) HFA Inhaler 2 Puff(s) Inhalation four times a day PRN Shortness of Breath and/or Wheezing  aluminum hydroxide/magnesium hydroxide/simethicone Suspension 30 milliLiter(s) Oral every 4 hours PRN Dyspepsia  melatonin 3 milliGRAM(s) Oral at bedtime PRN Insomnia  ondansetron Injectable 4 milliGRAM(s) IV Push every 8 hours PRN Nausea and/or Vomiting        
  Chief Complaint:  hypoxia    SUBJECTIVE / OVERNIGHT EVENTS:  No acute complaints overnight.  Pt is hard of hearing and a poor historian but offers no acute complaints.        I&O's Summary        PHYSICAL EXAM:  Vital Signs Last 24 Hrs  T(C): 36.6 (15 Robby 2022 12:57), Max: 36.9 (14 Jun 2022 16:08)  T(F): 97.8 (15 Robby 2022 12:57), Max: 98.4 (14 Jun 2022 16:08)  HR: 83 (15 Robby 2022 12:57) (64 - 83)  BP: 165/83 (15 Robby 2022 12:57) (129/75 - 165/83)  BP(mean): --  RR: 18 (15 Robby 2022 12:57) (18 - 18)  SpO2: 97% (15 Robby 2022 12:57) (95% - 99%)      GENERAL: pt examined bedside, laying comfortably in bed in NAD  HEENT: NC/AT, dry oral mucosa, clear conjunctiva, sclera nonicteric  RESPIRATORY: poor inspiratory effort, no wheezing, rhonchi, rales  CARDIOVASCULAR: RRR, normal S1 and S2  ABDOMEN: soft, NT/ND, normoactive bowel sounds, no rebound/guarding  EXTREMITIES: No cynaosis, no clubbing, no lower extremity edema, pulses are 2+ bilaterally  NEUROLOGY: A+O x2 no focal neurologic deficits appreciated   SKIN: scattered echymosis, small skin tear to rt elbow        LABS:                        13.3   10.11 )-----------( 137      ( 15 Robby 2022 07:05 )             39.9     06-15    142  |  109<H>  |  18.8  ----------------------------<  122<H>  4.1   |  24.0  |  1.25    Ca    8.3<L>      15 Robby 2022 07:05  Phos  2.7     06-15  Mg     1.9     06-15    TPro  5.5<L>  /  Alb  2.8<L>  /  TBili  1.2  /  DBili  x   /  AST  14  /  ALT  11  /  AlkPhos  83  06-14      CARDIAC MARKERS ( 14 Jun 2022 13:42 )  x     / <0.01 ng/mL / x     / x     / x              CAPILLARY BLOOD GLUCOSE            RADIOLOGY & ADDITIONAL TESTS:    < from: Xray Chest 1 View- PORTABLE-Urgent (Xray Chest 1 View- PORTABLE-Urgent .) (06.14.22 @ 14:21) >  IMPRESSION:  Low lung volumes.    Left basilar linear atelectasis. The lungs are otherwise clear. Patchy   right upper lobe opacity reported on the CTA of the chest is not able to   be seen.    < end of copied text >      < from: CT Angio Chest PE Protocol w/ IV Cont (06.12.22 @ 03:14) >  IMPRESSION:    No CT evidence of pulmonary embolism.    Patchy opacity in the right upper lobe may be infectious/inflammatory.    < end of copied text >        < from: CT Head No Cont (06.12.22 @ 00:01) >  IMPRESSION:  No acute intracranial hemorrhage or mass effect.    < end of copied text >        MEDICATIONS  (STANDING):  atorvastatin 20 milliGRAM(s) Oral at bedtime  cefepime   IVPB 2000 milliGRAM(s) IV Intermittent every 8 hours  diltiazem    Tablet 60 milliGRAM(s) Oral four times a day  donepezil 10 milliGRAM(s) Oral at bedtime  metoprolol succinate ER 50 milliGRAM(s) Oral daily  mirtazapine 7.5 milliGRAM(s) Oral at bedtime  predniSONE   Tablet 5 milliGRAM(s) Oral daily  rivaroxaban 10 milliGRAM(s) Oral daily  traZODone 100 milliGRAM(s) Oral at bedtime  venlafaxine XR. 75 milliGRAM(s) Oral daily    MEDICATIONS  (PRN):  acetaminophen     Tablet .. 650 milliGRAM(s) Oral every 6 hours PRN Temp greater or equal to 38C (100.4F), Mild Pain (1 - 3)  ALBUTerol    90 MICROgram(s) HFA Inhaler 2 Puff(s) Inhalation four times a day PRN Shortness of Breath and/or Wheezing  aluminum hydroxide/magnesium hydroxide/simethicone Suspension 30 milliLiter(s) Oral every 4 hours PRN Dyspepsia  melatonin 3 milliGRAM(s) Oral at bedtime PRN Insomnia  ondansetron Injectable 4 milliGRAM(s) IV Push every 8 hours PRN Nausea and/or Vomiting                                  
  Chief Complaint:  hypoxia    SUBJECTIVE / OVERNIGHT EVENTS:  No acute complaints overnight.  Pt is hard of hearing and a poor historian but offers no acute complaints.        I&O's Summary        PHYSICAL EXAM:  Vital Signs Last 24 Hrs  T(C): 36.3 (17 Jun 2022 10:34), Max: 37.4 (16 Jun 2022 17:20)  T(F): 97.4 (17 Jun 2022 10:34), Max: 99.3 (16 Jun 2022 17:20)  HR: 72 (17 Jun 2022 10:34) (63 - 73)  BP: 124/81 (17 Jun 2022 10:34) (124/81 - 149/85)  BP(mean): --  RR: 16 (17 Jun 2022 10:34) (16 - 18)  SpO2: 97% (17 Jun 2022 10:34) (93% - 97%)      GENERAL: pt examined bedside, laying comfortably in bed in NAD  HEENT: NC/AT, dry oral mucosa, clear conjunctiva, sclera nonicteric  RESPIRATORY: poor inspiratory effort, no wheezing, rhonchi, rales  CARDIOVASCULAR: RRR, normal S1 and S2  ABDOMEN: soft, NT/ND, normoactive bowel sounds, no rebound/guarding  EXTREMITIES: No cynaosis, no clubbing, no lower extremity edema, pulses are 2+ bilaterally  NEUROLOGY: A+O x2 no focal neurologic deficits appreciated   SKIN: scattered echymosis, small skin tear to rt elbow        LABS:                                        13.7   8.17  )-----------( 121      ( 17 Jun 2022 06:54 )             42.7       06-17    138  |  107  |  21.7<H>  ----------------------------<  120<H>  3.8   |  22.0  |  1.21    Ca    8.2<L>      17 Jun 2022 06:54  Phos  2.6     06-16  Mg     2.1     06-17          CAPILLARY BLOOD GLUCOSE            RADIOLOGY & ADDITIONAL TESTS:    < from: Xray Chest 1 View- PORTABLE-Urgent (Xray Chest 1 View- PORTABLE-Urgent .) (06.14.22 @ 14:21) >  IMPRESSION:  Low lung volumes.    Left basilar linear atelectasis. The lungs are otherwise clear. Patchy   right upper lobe opacity reported on the CTA of the chest is not able to   be seen.    < end of copied text >      < from: CT Angio Chest PE Protocol w/ IV Cont (06.12.22 @ 03:14) >  IMPRESSION:    No CT evidence of pulmonary embolism.    Patchy opacity in the right upper lobe may be infectious/inflammatory.    < end of copied text >        < from: CT Head No Cont (06.12.22 @ 00:01) >  IMPRESSION:  No acute intracranial hemorrhage or mass effect.    < end of copied text >        MEDICATIONS  (STANDING):  atorvastatin 20 milliGRAM(s) Oral at bedtime  cefepime   IVPB 2000 milliGRAM(s) IV Intermittent every 8 hours  diltiazem    Tablet 60 milliGRAM(s) Oral four times a day  donepezil 10 milliGRAM(s) Oral at bedtime  metoprolol succinate ER 50 milliGRAM(s) Oral daily  mirtazapine 7.5 milliGRAM(s) Oral at bedtime  predniSONE   Tablet 5 milliGRAM(s) Oral daily  rivaroxaban 10 milliGRAM(s) Oral daily  traZODone 100 milliGRAM(s) Oral at bedtime  venlafaxine XR. 75 milliGRAM(s) Oral daily    MEDICATIONS  (PRN):  acetaminophen     Tablet .. 650 milliGRAM(s) Oral every 6 hours PRN Temp greater or equal to 38C (100.4F), Mild Pain (1 - 3)  ALBUTerol    90 MICROgram(s) HFA Inhaler 2 Puff(s) Inhalation four times a day PRN Shortness of Breath and/or Wheezing  aluminum hydroxide/magnesium hydroxide/simethicone Suspension 30 milliLiter(s) Oral every 4 hours PRN Dyspepsia  melatonin 3 milliGRAM(s) Oral at bedtime PRN Insomnia  ondansetron Injectable 4 milliGRAM(s) IV Push every 8 hours PRN Nausea and/or Vomiting                                  
  Chief Complaint:  hypoxia    SUBJECTIVE / OVERNIGHT EVENTS:  No acute complaints overnight.  Pt is hard of hearing and a poor historian but offers no acute complaints.        I&O's Summary        PHYSICAL EXAM:  Vital Signs Last 24 Hrs  T(C): 36.7 (16 Jun 2022 11:14), Max: 36.8 (15 Robby 2022 23:00)  T(F): 98.1 (16 Jun 2022 11:14), Max: 98.3 (16 Jun 2022 05:09)  HR: 75 (16 Jun 2022 11:14) (60 - 75)  BP: 128/69 (16 Jun 2022 11:14) (128/69 - 160/68)  BP(mean): --  RR: 18 (16 Jun 2022 11:14) (18 - 18)  SpO2: 97% (16 Jun 2022 11:14) (95% - 98%)      GENERAL: pt examined bedside, laying comfortably in bed in NAD  HEENT: NC/AT, dry oral mucosa, clear conjunctiva, sclera nonicteric  RESPIRATORY: poor inspiratory effort, no wheezing, rhonchi, rales  CARDIOVASCULAR: RRR, normal S1 and S2  ABDOMEN: soft, NT/ND, normoactive bowel sounds, no rebound/guarding  EXTREMITIES: No cynaosis, no clubbing, no lower extremity edema, pulses are 2+ bilaterally  NEUROLOGY: A+O x2 no focal neurologic deficits appreciated   SKIN: scattered echymosis, small skin tear to rt elbow        LABS:                                            13.3   7.76  )-----------( 124      ( 16 Jun 2022 07:41 )             39.9       06-16    140  |  112<H>  |  21.2<H>  ----------------------------<  108<H>  3.4<L>   |  22.0  |  1.16    Ca    7.8<L>      16 Jun 2022 07:41  Phos  2.6     06-16  Mg     1.9     06-16          CAPILLARY BLOOD GLUCOSE            RADIOLOGY & ADDITIONAL TESTS:    < from: Xray Chest 1 View- PORTABLE-Urgent (Xray Chest 1 View- PORTABLE-Urgent .) (06.14.22 @ 14:21) >  IMPRESSION:  Low lung volumes.    Left basilar linear atelectasis. The lungs are otherwise clear. Patchy   right upper lobe opacity reported on the CTA of the chest is not able to   be seen.    < end of copied text >      < from: CT Angio Chest PE Protocol w/ IV Cont (06.12.22 @ 03:14) >  IMPRESSION:    No CT evidence of pulmonary embolism.    Patchy opacity in the right upper lobe may be infectious/inflammatory.    < end of copied text >        < from: CT Head No Cont (06.12.22 @ 00:01) >  IMPRESSION:  No acute intracranial hemorrhage or mass effect.    < end of copied text >        MEDICATIONS  (STANDING):  atorvastatin 20 milliGRAM(s) Oral at bedtime  cefepime   IVPB 2000 milliGRAM(s) IV Intermittent every 8 hours  diltiazem    Tablet 60 milliGRAM(s) Oral four times a day  donepezil 10 milliGRAM(s) Oral at bedtime  metoprolol succinate ER 50 milliGRAM(s) Oral daily  mirtazapine 7.5 milliGRAM(s) Oral at bedtime  predniSONE   Tablet 5 milliGRAM(s) Oral daily  rivaroxaban 10 milliGRAM(s) Oral daily  traZODone 100 milliGRAM(s) Oral at bedtime  venlafaxine XR. 75 milliGRAM(s) Oral daily    MEDICATIONS  (PRN):  acetaminophen     Tablet .. 650 milliGRAM(s) Oral every 6 hours PRN Temp greater or equal to 38C (100.4F), Mild Pain (1 - 3)  ALBUTerol    90 MICROgram(s) HFA Inhaler 2 Puff(s) Inhalation four times a day PRN Shortness of Breath and/or Wheezing  aluminum hydroxide/magnesium hydroxide/simethicone Suspension 30 milliLiter(s) Oral every 4 hours PRN Dyspepsia  melatonin 3 milliGRAM(s) Oral at bedtime PRN Insomnia  ondansetron Injectable 4 milliGRAM(s) IV Push every 8 hours PRN Nausea and/or Vomiting                                  
INFECTIOUS DISEASES AND INTERNAL MEDICINE at [place]  =======================================================  Jack Hooper MD  Diplomates American Board of Internal Medicine and Infectious Diseases  Telephone [phone]  Fax            [phone]  =======================================================    DOMONIQUE MAST [id number]    Follow up: PNEUMONIA    Allergies:  penicillin (Hives)      Medications:  acetaminophen     Tablet .. 650 milliGRAM(s) Oral every 6 hours PRN  ALBUTerol    90 MICROgram(s) HFA Inhaler 2 Puff(s) Inhalation four times a day PRN  aluminum hydroxide/magnesium hydroxide/simethicone Suspension 30 milliLiter(s) Oral every 4 hours PRN  atorvastatin 20 milliGRAM(s) Oral at bedtime  cefepime   IVPB 2000 milliGRAM(s) IV Intermittent every 8 hours  diltiazem    Tablet 60 milliGRAM(s) Oral four times a day  donepezil 10 milliGRAM(s) Oral at bedtime  melatonin 3 milliGRAM(s) Oral at bedtime PRN  metoprolol succinate ER 50 milliGRAM(s) Oral daily  mirtazapine 7.5 milliGRAM(s) Oral at bedtime  ondansetron Injectable 4 milliGRAM(s) IV Push every 8 hours PRN  predniSONE   Tablet 5 milliGRAM(s) Oral daily  rivaroxaban 10 milliGRAM(s) Oral daily  traZODone 100 milliGRAM(s) Oral at bedtime  venlafaxine XR. 75 milliGRAM(s) Oral daily    SOCIAL       FAMILY   FAMILY HISTORY:    REVIEW OF SYSTEMS:  CONfused unable  ot obtain            Physical Exam:   Vital Signs Last 24 Hrs  T(C): 36.5 (17 Jun 2022 05:05), Max: 37.4 (16 Jun 2022 17:20)  T(F): 97.7 (17 Jun 2022 05:05), Max: 99.3 (16 Jun 2022 17:20)  HR: 63 (17 Jun 2022 05:05) (63 - 75)  BP: 147/76 (17 Jun 2022 05:05) (128/69 - 149/85)  BP(mean): --  RR: 18 (17 Jun 2022 05:05) (18 - 18)  SpO2: 95% (17 Jun 2022 05:05) (93% - 97%)    GEN: NAD,   HEENT: normocephalic and atraumatic. EOMI. RUKHSANA.    NECK: Supple. No carotid bruits.  No lymphadenopathy or thyromegaly.  LUNGS: Clear to auscultation.  HEART: Regular rate and rhythm without murmur.  ABDOMEN: Soft, nontender, and nondistended.  Positive bowel sounds.    : No CVA tenderness  EXTREMITIES: Without any cyanosis, clubbing, rash, lesions or edema.  MSK: no joint swelling  NEUROLOGIC confused     Labs:  Vitals:  ===     =======================================================  Current Antibiotics:  cefepime   IVPB 2000 milliGRAM(s) IV Intermittent every 8 hours    Other medications:  atorvastatin 20 milliGRAM(s) Oral at bedtime  diltiazem    Tablet 60 milliGRAM(s) Oral four times a day  donepezil 10 milliGRAM(s) Oral at bedtime  metoprolol succinate ER 50 milliGRAM(s) Oral daily  mirtazapine 7.5 milliGRAM(s) Oral at bedtime  predniSONE   Tablet 5 milliGRAM(s) Oral daily  rivaroxaban 10 milliGRAM(s) Oral daily  traZODone 100 milliGRAM(s) Oral at bedtime  venlafaxine XR. 75 milliGRAM(s) Oral daily      =======================================================  Labs:                              13.7   8.17  )-----------( 121      ( 17 Jun 2022 06:54 )             42.7   06-17    138  |  107  |  21.7<H>  ----------------------------<  120<H>  3.8   |  22.0  |  1.21    Ca    8.2<L>      17 Jun 2022 06:54  Phos  2.6     06-16  Mg     2.1     06-17      Culture - Blood (collected 06-14-22 @ 19:12)  Source: .Blood Blood-Peripheral    Culture - Blood (collected 06-14-22 @ 19:12)  Source: .Blood Blood-Peripheral    Culture - Urine (collected 06-12-22 @ 10:45)  Source: Clean Catch Clean Catch (Midstream)  Organism: Pseudomonas aeruginosa  Enterococcus faecalis (06-15-22 @ 13:35)  Organism: Enterococcus faecalis (06-15-22 @ 13:35)    Sensitivities:      -  Ampicillin: S <=2 Predicts results to ampicillin/sulbactam, amoxacillin-clavulanate and  piperacillin-tazobactam.      -  Ciprofloxacin: S <=1      -  Levofloxacin: S <=1      -  Nitrofurantoin: S <=32 Should not be used to treat pyelonephritis.      -  Tetra/Doxy: R >8      -  Vancomycin: S 2      Method Type: HERVE  Organism: Pseudomonas aeruginosa (06-14-22 @ 08:26)    Sensitivities:      -  Amikacin: S <=16      -  Aztreonam: S <=4      -  Cefepime: S <=2      -  Ceftazidime: S 4      -  Ciprofloxacin: S <=0.25      -  Gentamicin: S 4      -  Imipenem: S <=1      -  Levofloxacin: S <=0.5      -  Meropenem: S <=1      -  Piperacillin/Tazobactam: S <=8      -  Tobramycin: S <=2      Method Type: HERVE      Creatinine, Serum: 1.16 mg/dL (06-16-22 @ 07:41)  Creatinine, Serum: 1.25 mg/dL (06-15-22 @ 07:05)  Creatinine, Serum: 1.14 mg/dL (06-14-22 @ 13:42)  Creatinine, Serum: 1.17 mg/dL (06-11-22 @ 23:20)        Ferritin, Serum: 150 ng/mL (05-31-22 @ 07:43)      WBC Count: 7.76 K/uL (06-16-22 @ 07:41)  WBC Count: 10.11 K/uL (06-15-22 @ 07:05)  WBC Count: 9.63 K/uL (06-14-22 @ 13:42)  WBC Count: 13.09 K/uL (06-11-22 @ 23:20)    SARS-CoV-2: NotDetec (06-14-22 @ 13:57)  SARS-CoV-2: Detected (06-11-22 @ 23:38)  COVID-19 PCR: Detected (06-02-22 @ 03:07)  SARS-CoV-2: Detected (05-27-22 @ 22:20)      Alkaline Phosphatase, Serum: 83 U/L (06-14-22 @ 13:42)  Alanine Aminotransferase (ALT/SGPT): 11 U/L (06-14-22 @ 13:42)  Aspartate Aminotransferase (AST/SGOT): 14 U/L (06-14-22 @ 13:42)  Bilirubin Total, Serum: 1.2 mg/dL (06-14-22 @ 13:42)  
Pneumonia due to organism    HPI:  87 y/o male with Hx of MDD, Dementia, HLD, HTN, prostate cancer, recent admission for UTI and COVID, p/w tachypnea and hypoxia. Patient demented, unknown why he is in the hospital. History obtained from chart review. Patient recently admitted for COVID, dc 2 weeks ago and completed Remdesivir and Decadron x 3 days. Came to the ED 2 days earlier with SOB, and was found to have RUL PNA on CTA, he was discharged back to [place] with PO Levaquin. Noted this AM by [place] staff to be continued tachypnea as well as mildly hypoxic on RA, and he was brought in for further care. Upon my examination, patient denies any concerns. Does not report any fever, chill, CP, SOB, and no abd pain, n/v/c/d, nor dysuria nor increased urinary frequency.     ED vitals and labs stable. Given failed PO Levaquin, admitted for further care.  (14 Jun 2022 15:48)    Interval History:  Patient was seen and examined at bedside around 11:15 am.  Breathing is OK. Wants to rest.   Denies chest pain, palpitations, headache, dizziness, visual symptoms, nausea, vomiting or abdominal pain.    ROS:  As per interval history otherwise unremarkable.    PHYSICAL EXAM:  Vital Signs   T(C): 37.1 (18 Jun 2022 17:03), Max: 37.1 (18 Jun 2022 17:03)  T(F): 98.7 (18 Jun 2022 17:03), Max: 98.7 (18 Jun 2022 17:03)  HR: 69 (18 Jun 2022 17:03) (69 - 83)  BP: 138/79 (18 Jun 2022 17:03) (117/68 - 170/81)  RR: 17 (18 Jun 2022 17:03) (17 - 18)  SpO2: 98% (18 Jun 2022 17:03) (92% - 98%)  General: Elderly male lying in bed comfortably. No acute distress  HEENT: EOMI. Clear conjunctivae. Moist mucus membrane  Neck: Supple.   Chest: Good air entry. No wheezing, rales or rhonchi.   Heart: Normal S1 & S2. RRR.  Abdomen: Soft. Non-tender. Non-distended. + BS  Ext: No pedal edema. No calf tenderness   Neuro: Awake. Moves all four extremities. No speech disorder  Skin: Warm and Dry  Psychiatry: Normal mood and affect    I&O's Summary    18 Jun 2022 07:01  -  18 Jun 2022 17:25  --------------------------------------------------------  IN: 0 mL / OUT: 250 mL / NET: -250 mL    LABS:                        12.9   6.95  )-----------( 104      ( 18 Jun 2022 07:51 )             39.5     06-18    141  |  110<H>  |  26.8<H>  ----------------------------<  100<H>  3.9   |  24.0  |  1.01    Ca    8.2<L>      18 Jun 2022 07:51  Mg     2.1     06-17    RADIOLOGY & ADDITIONAL STUDIES:  Reviewed     MEDICATIONS  (STANDING):  atorvastatin 20 milliGRAM(s) Oral at bedtime  cefepime   IVPB 2000 milliGRAM(s) IV Intermittent every 8 hours  diltiazem    Tablet 60 milliGRAM(s) Oral four times a day  donepezil 10 milliGRAM(s) Oral at bedtime  metoprolol succinate ER 50 milliGRAM(s) Oral daily  mirtazapine 7.5 milliGRAM(s) Oral at bedtime  predniSONE   Tablet 5 milliGRAM(s) Oral daily  rivaroxaban 10 milliGRAM(s) Oral daily  traZODone 100 milliGRAM(s) Oral at bedtime  venlafaxine XR. 75 milliGRAM(s) Oral daily    MEDICATIONS  (PRN):  acetaminophen     Tablet .. 650 milliGRAM(s) Oral every 6 hours PRN Temp greater or equal to 38C (100.4F), Mild Pain (1 - 3)  ALBUTerol    90 MICROgram(s) HFA Inhaler 2 Puff(s) Inhalation four times a day PRN Shortness of Breath and/or Wheezing  aluminum hydroxide/magnesium hydroxide/simethicone Suspension 30 milliLiter(s) Oral every 4 hours PRN Dyspepsia  melatonin 3 milliGRAM(s) Oral at bedtime PRN Insomnia  ondansetron Injectable 4 milliGRAM(s) IV Push every 8 hours PRN Nausea and/or Vomiting

## 2022-06-21 NOTE — DIETITIAN INITIAL EVALUATION ADULT - ETIOLOGY
Related to inability to meet sufficient protein energy requirements in setting of advanced age with dementia; recent COVID infection, +PNA

## 2022-06-21 NOTE — PROGRESS NOTE ADULT - ASSESSMENT
87 y/o M w/ PMH of MDD, dementia, hard of hearing, HLD, HTN, prostate CA, recent admission for UTI and COVID was BIBA from [place] for worsening hypoxia.  Initial w/u revealed RUL PNA that failed PO Levaquin as outpt.       Acute hypoxic respiratory failure 2/2 HAP vs aspiration PNA  - Recent hospitalization w/ COVID 19, diagnosed 5/27 and completed Remdesivir and decadron x 3 days   - s/p vaccination and booster   - RVP/COVID and MRSA PCR (-)  - CTA chest significant for patch RUL opacity but (-) for PE   - On Cefepime given PCN allergy and recent admission  - On 2L NC, wean as tolerated  - Blood cultures NGTD  - On Xeralto for VTE ppx from recent COVID infection  - Maintain on aspiration precaution   - ID following and recs noted       Hypokalemia   - Supplemented   - Monitor levels and replete as needed      Urine cx w/ enterococcus and pseudomonas   - Sepsis not present on admission  - Ucx w/ 10-49k of enterococcus and pseudomonas likely 2/2 recent UTI tx   - On Cefepime for PNA  - ID following       Dementia  - On Donepezil, Mirtazapine, Trazodone, Venlafaxine   - Fall and aspiration precaution       HLD / HTN  - On Metoprolol and Cardizem       Prostate cancer  Overactive bladder   - On Gemtesa at home, resume upon discharge      VTE ppx: Xeralto    Dispo: PT consult noted and recommending [facility].  Medically stable for d/c, will start d/c process.     
87 y/o M w/ PMH of MDD, dementia, hard of hearing, HLD, HTN, prostate CA, recent admission for UTI and COVID was BIBA from [place] for worsening hypoxia.  Initial w/u revealed RUL PNA that failed PO Levaquin as outpt.       Acute hypoxic respiratory failure 2/2 HAP vs aspiration PNA  - Recent hospitalization w/ COVID 19, diagnosed 5/27   - s/p vaccination and booster   - RVP/COVID and MRSA PCR (-)  - CTA chest significant for patch RUL opacity but (-) for PE   - On Cefepime (day 4) PCN allergy and recent hospitalization  - On 2L NC, wean as tolerated  - Blood cultures NGTD  - On Xeralto for VTE ppx from recent COVID infection  - Maintain on aspiration precaution   - ID following and recs noted       Hypokalemia   - Resolved  - Monitor levels and replete as needed      Urine cx w/ enterococcus and pseudomonas   - Sepsis not present on admission  - Ucx w/ 10-49k of enterococcus and pseudomonas likely 2/2 recent UTI tx   - On Cefepime for PNA  - ID following       Dementia  - On Donepezil, Mirtazapine, Trazodone, Venlafaxine   - Fall and aspiration precaution       HLD / HTN  - On Metoprolol and Cardizem       Prostate cancer  Overactive bladder   - On Gemtesa at home, resume upon discharge      VTE ppx: Xeralto    Dispo: PT consult noted and recommending [facility].  Medically stable for d/c and placement pending.      
87 y/o male with Hx of MDD, Dementia, HLD, HTN, prostate cancer, recent admission for UTI and COVID, p/w tachypnea and hypoxia. . History obtained from chart review. Patient recently admitted for COVID, dc 2 weeks ago and completed Remdesivir and Decadron x 3 days. Came to the ED 2 days earlier with SOB, and was found to have RUL PNA on CTA, he was discharged back to [place] with PO Levaquin. Noted this AM by [place] staff to be continued tachypnea as well as mildly hypoxic on RA, and he was brought in for further care. Upon my examination, patient denies any concerns. Does not report any fever, chill, CP, SOB, and no abd pain, n/v/c/d, nor dysuria nor increased urinary frequency.  URINE CX WITH LOW LEVEL COLONY COUNT FOR PSEUDOMONAS ENTEROCOCCUS   CT SCAN WITH PNEUMONIA   PT PLACED ON IV ABX CEFIPIME  FOR PNEUMONIA  URINE CX AS ABOVE LOW LEVEL COUNT  UNCLEAR SIGNIFICANCE     BLOOD CX neg  OVERALL  CLINICALLY  IMPROVED  COVID POS 5/27  OLD    PLAN  EMPIRIC IV ABX X 7 DAYS THROUGH 6/20  SPOKE TO HOSPITALIST  WILL FOLLOWUP AS NEEDED PLEASE CALL IF QUESTIONS             
88 year old male with history of Depression, Dementia, Hard of Hearing, Prostate Cancer, Recent UTI, HTN, HLD and COVID-19 presented from [facility] with worsening hypoxia. Recently diagnosed with pneumonia, failed outpatient Levaquin.      1) Acute hypoxic respiratory failure   - Likely secondary to HCAP (suspected gram negative rods)    - Recent hospitalization w/ COVID 19, diagnosed 5/27   - s/p vaccination and booster   - RVP/COVID and MRSA PCR (-)  - CTA chest significant for patch RUL opacity but (-) for PE   - On Cefepime (day 5) till 6/20/22  - On 2L NC, wean as tolerated  - Blood cultures NGTD  - ID follow up noted     2) Urine cx w/ enterococcus and pseudomonas   - No Sepsis on admission  - Already on Cefepime   - ID follow up noted     3) Dementia  - Continue Aricept     4) Depression   - Continue Mirtazapine, Trazodone and Venlafaxine     5) HTN  - Continue Cardizem and Metoprolol     6) HLD  - Continue Lipitor     7) History of Overactive Bladder   - On Gemtesa at home  - Will start Oxybutynin while here     8) Hypokalemia  - Repleted    9) Thrombocytopenia  - Chronic   - Monitor     DVT Prophylaxis -- Patient is on Xarelto due to recent COVID.     Dispo: [facility] once arranged.     
87 y/o male with Hx of MDD, Dementia, HLD, HTN, prostate cancer, recent admission for UTI and COVID, p/w tachypnea and hypoxia. . History obtained from chart review. Patient recently admitted for COVID, dc 2 weeks ago and completed Remdesivir and Decadron x 3 days. Came to the ED 2 days earlier with SOB, and was found to have RUL PNA on CTA, he was discharged back to [place] with PO Levaquin. Noted this AM by [place] staff to be continued tachypnea as well as mildly hypoxic on RA, and he was brought in for further care. Upon my examination, patient denies any concerns. Does not report any fever, chill, CP, SOB, and no abd pain, n/v/c/d, nor dysuria nor increased urinary frequency.  URINE CX WITH LOW LEVEL COLONY COUNT FOR PSEUDOMONAS ENTEROCOCCUS   CT SCAN WITH PNEUMONIA   PT PLACED ON IV ABX CEFIPIME  FOR PNEUMONIA  URINE CX AS ABOVE LOW LEVEL COUNT  UNCLEAR SIGNIFICANCE     BLOOD CX neg  OVERALL IMPROVED  COVID POS 5/27   MAY NOT NEED COVID ISOLATION  WILL CALL INF CONTROL   WILL FOLLOWUP WITH FURTHER RECOMMENDATIONS             
88 year old male with history of Depression, Dementia, Hard of Hearing, Prostate Cancer, Recent UTI, HTN, HLD and COVID-19 presented from [facility] with worsening hypoxia. Recently diagnosed with pneumonia, failed outpatient Levaquin.      1) Acute hypoxic respiratory failure   - Likely secondary to HCAP (suspected gram negative rods)    - Recent hospitalization w/ COVID 19, diagnosed 5/27   - s/p vaccination and booster   - RVP/COVID and MRSA PCR (-)  - CTA chest significant for patch RUL opacity but (-) for PE   - Blood cultures NGTD  - Weaned off supplemental oxygen   - On Cefepime (day 6) till 6/20/22  - ID follow up noted     2) Urine cx w/ enterococcus and pseudomonas   - No Sepsis on admission  - Already on Cefepime   - ID follow up noted     3) Dementia  - Continue Aricept     4) Depression   - Continue Mirtazapine, Trazodone and Venlafaxine     5) HTN  - Continue Cardizem and Metoprolol     6) HLD  - Continue Lipitor     7) History of Overactive Bladder   - On Gemtesa at home  - Started Oxybutynin while here     8) Hypokalemia  - Repleted    9) Thrombocytopenia  - Chronic   - Monitor     DVT Prophylaxis -- Patient is on Xarelto due to recent COVID.     Dispo: [facility] once arranged.     
88 year old male with history of Depression, Dementia, Hard of Hearing, Prostate Cancer, Recent UTI, HTN, HLD and COVID-19 presented from [facility] with worsening hypoxia. Recently diagnosed with pneumonia, failed outpatient Levaquin.      1) Acute hypoxic respiratory failure   - Likely secondary to HCAP (suspected gram negative rods)    - Recent hospitalization w/ COVID 19, diagnosed 5/27   - s/p vaccination and booster   - RVP/COVID and MRSA PCR (-)  - CTA chest significant for patch RUL opacity but (-) for PE   - Blood cultures NGTD  - Weaned off supplemental oxygen   - On Cefepime (day 7) - last day today   - ID follow up noted     2) Urine cx w/ enterococcus and pseudomonas   - No Sepsis on admission  - Already on Cefepime   - ID follow up noted     3) Dementia  - Continue Aricept     4) Depression   - Continue Mirtazapine, Trazodone and Venlafaxine     5) HTN  - Continue Cardizem and Metoprolol     6) HLD  - Continue Lipitor     7) History of Overactive Bladder   - On Gemtesa at home  - Started Oxybutynin while here     8) Hypokalemia  - Repleted    9) Thrombocytopenia  - Chronic   - Monitor     DVT Prophylaxis -- Patient is on Xarelto due to recent COVID.     Dispo: [facility] vs [facility] in 24 hours. PT follow up.     
89 y/o M w/ PMH of MDD, dementia, hard of hearing, HLD, HTN, prostate CA, recent admission for UTI and COVID was BIBA from [place] for worsening hypoxia.  Initial w/u revealed RUL PNA that failed PO Levaquin as outpt.       Acute hypoxic respiratory failure 2/2 HAP vs aspiration PNA  - Recent hospitalization w/ COVID 19, diagnosed 5/27 and completed Remdesivir and decadron x 3 days   - s/p vaccination and booster   - RVP/COVID and MRSA PCR (-)  - CTA chest significant for patch RUL opacity but (-) for PE   - On Cefepime given PCN allergy and recent admission  - On 2L NC, wean as tolerated  - f/u urine legionella and Bcxs  - On Xeralto for VTE ppx from recent COVID infection  - Maintain on aspiration precaution   - ID following and recs noted       Urine cx w/ enterococcus and pseudomonus   - Sepsis not present on admission  - Ucx w/ 10-49k of enterococcus and pseudomuns likely 2/2 recent UTI tx   - On Cefepime for PNA  - ID following       Dementia  - On Donepezil, Mirtazapine, Trazodone, Venlafaxine   - Fall and aspiration precaution       HLD / HTN  - On Metoprolol and Cardizem       Prostate cancer  Overactive bladder   - On Gemtesa at home, resume upon discharge      VTE ppx: Xeralto    Dispo: PT consulted.  Anticipate d/c in 1-2 days if medically stable.     Son Good updated on the phone 6/14
88 year old male with history of Depression, Dementia, Hard of Hearing, Prostate Cancer, Recent UTI, HTN, HLD and COVID-19 presented from [facility] with worsening hypoxia. Recently diagnosed with pneumonia, failed outpatient Levaquin.      1) Acute hypoxic respiratory failure   - Likely secondary to HCAP (suspected gram negative rods)    - Recent hospitalization w/ COVID 19, diagnosed 5/27   - s/p vaccination and booster   - RVP/COVID and MRSA PCR (-)  - CTA chest significant for patch RUL opacity but (-) for PE   - Blood cultures NGTD  - Weaned off supplemental oxygen   - Finished Cefepime x 7 days    - ID follow up noted     2) Urine cx w/ enterococcus and pseudomonas   - No Sepsis on admission  - Already on Cefepime   - ID follow up noted     3) Dementia  - Continue Aricept     4) Depression   - Continue Mirtazapine, Trazodone and Venlafaxine     5) HTN  - Continue Cardizem and Metoprolol     6) HLD  - Continue Lipitor     7) History of Overactive Bladder   - On Gemtesa at home  - Started Oxybutynin while here     8) Hypokalemia  - Repleted    9) Thrombocytopenia  - Chronic   - Monitor     DVT Prophylaxis -- Patient is on Xarelto due to recent COVID.     Dispo: [facility] tomorrow.

## 2022-06-21 NOTE — PROGRESS NOTE ADULT - PROVIDER SPECIALTY LIST ADULT
Hospitalist
Infectious Disease
Hospitalist
Infectious Disease

## 2022-06-21 NOTE — PROGRESS NOTE ADULT - NUTRITIONAL ASSESSMENT
This patient has been assessed with a concern for Malnutrition and has been determined to have a diagnosis/diagnoses of Severe protein-calorie malnutrition.    This patient is being managed with:   Diet DASH/TLC-  Sodium & Cholesterol Restricted  Supplement Feeding Modality:  Oral  Ensure Enlive Cans or Servings Per Day:  1       Frequency:  Three Times a day  Entered: Jun 19 2022  2:36PM

## 2022-06-21 NOTE — DIETITIAN NUTRITION RISK NOTIFICATION - TREATMENT: THE FOLLOWING DIET HAS BEEN RECOMMENDED
Diet, DASH/TLC:   Sodium & Cholesterol Restricted  Supplement Feeding Modality:  Oral  Ensure Enlive Cans or Servings Per Day:  1       Frequency:  Three Times a day (06-19-22 @ 14:36) [Active]

## 2022-06-21 NOTE — DIETITIAN INITIAL EVALUATION ADULT - PERTINENT MEDS FT
MEDICATIONS  (STANDING):  atorvastatin 20 milliGRAM(s) Oral at bedtime  diltiazem    Tablet 60 milliGRAM(s) Oral four times a day  donepezil 10 milliGRAM(s) Oral at bedtime  metoprolol succinate ER 50 milliGRAM(s) Oral daily  mirtazapine 7.5 milliGRAM(s) Oral at bedtime  oxybutynin 5 milliGRAM(s) Oral at bedtime  predniSONE   Tablet 5 milliGRAM(s) Oral daily  rivaroxaban 10 milliGRAM(s) Oral daily  traZODone 100 milliGRAM(s) Oral at bedtime  venlafaxine XR. 75 milliGRAM(s) Oral daily    MEDICATIONS  (PRN):  acetaminophen     Tablet .. 650 milliGRAM(s) Oral every 6 hours PRN Temp greater or equal to 38C (100.4F), Mild Pain (1 - 3)  ALBUTerol    90 MICROgram(s) HFA Inhaler 2 Puff(s) Inhalation four times a day PRN Shortness of Breath and/or Wheezing  aluminum hydroxide/magnesium hydroxide/simethicone Suspension 30 milliLiter(s) Oral every 4 hours PRN Dyspepsia  melatonin 3 milliGRAM(s) Oral at bedtime PRN Insomnia  ondansetron Injectable 4 milliGRAM(s) IV Push every 8 hours PRN Nausea and/or Vomiting

## 2022-06-21 NOTE — DIETITIAN INITIAL EVALUATION ADULT - OTHER INFO
Pt is a 87 y/o male with Hx of MDD, Dementia, HLD, HTN, prostate cancer, recent admission for UTI and COVID, p/w tachypnea and hypoxia. Patient demented, unknown why he is in the hospital. History obtained from chart review. Patient recently admitted for COVID, dc 2 weeks ago and completed Remdesivir and Decadron x 3 days. Came to the ED 2 days earlier with SOB, and was found to have RUL PNA on CTA, he was discharged back to [facility] with PO Levaquin. Noted this AM by [facility] staff to be continued tachypnea as well as mildly hypoxic on RA, and he was brought in for further care.   Pt admitted with Acute hypoxic respiratory failure Likely secondary to HCAP.

## 2022-06-21 NOTE — GOALS OF CARE CONVERSATION - ADVANCED CARE PLANNING - CONVERSATION DETAILS
Discussed advanced directives with patient's son - Good Gardner who is Health Care Proxy. He wants to give a trial of resuscitation (chest compressions only) but no mechanical ventilation. Explained to him that most of the time those who require chest compressions they end up going to the ventilator -- Good understood and was very clear that no mechanical ventilation.

## 2022-06-22 VITALS
SYSTOLIC BLOOD PRESSURE: 158 MMHG | OXYGEN SATURATION: 94 % | DIASTOLIC BLOOD PRESSURE: 87 MMHG | TEMPERATURE: 98 F | HEART RATE: 77 BPM | RESPIRATION RATE: 18 BRPM

## 2022-06-22 LAB — SARS-COV-2 RNA SPEC QL NAA+PROBE: SIGNIFICANT CHANGE UP

## 2022-06-22 PROCEDURE — 87040 BLOOD CULTURE FOR BACTERIA: CPT

## 2022-06-22 PROCEDURE — 71045 X-RAY EXAM CHEST 1 VIEW: CPT

## 2022-06-22 PROCEDURE — 99285 EMERGENCY DEPT VISIT HI MDM: CPT

## 2022-06-22 PROCEDURE — 99239 HOSP IP/OBS DSCHRG MGMT >30: CPT

## 2022-06-22 PROCEDURE — 82962 GLUCOSE BLOOD TEST: CPT

## 2022-06-22 PROCEDURE — 87640 STAPH A DNA AMP PROBE: CPT

## 2022-06-22 PROCEDURE — 87086 URINE CULTURE/COLONY COUNT: CPT

## 2022-06-22 PROCEDURE — 81001 URINALYSIS AUTO W/SCOPE: CPT

## 2022-06-22 PROCEDURE — 97530 THERAPEUTIC ACTIVITIES: CPT

## 2022-06-22 PROCEDURE — U0005: CPT

## 2022-06-22 PROCEDURE — 96374 THER/PROPH/DIAG INJ IV PUSH: CPT | Mod: XU

## 2022-06-22 PROCEDURE — 85025 COMPLETE CBC W/AUTO DIFF WBC: CPT

## 2022-06-22 PROCEDURE — 0225U NFCT DS DNA&RNA 21 SARSCOV2: CPT

## 2022-06-22 PROCEDURE — 83735 ASSAY OF MAGNESIUM: CPT

## 2022-06-22 PROCEDURE — 97116 GAIT TRAINING THERAPY: CPT

## 2022-06-22 PROCEDURE — 87641 MR-STAPH DNA AMP PROBE: CPT

## 2022-06-22 PROCEDURE — 80053 COMPREHEN METABOLIC PANEL: CPT

## 2022-06-22 PROCEDURE — 93005 ELECTROCARDIOGRAM TRACING: CPT

## 2022-06-22 PROCEDURE — 36415 COLL VENOUS BLD VENIPUNCTURE: CPT

## 2022-06-22 PROCEDURE — 70450 CT HEAD/BRAIN W/O DYE: CPT | Mod: MG

## 2022-06-22 PROCEDURE — 87186 SC STD MICRODIL/AGAR DIL: CPT

## 2022-06-22 PROCEDURE — 71275 CT ANGIOGRAPHY CHEST: CPT | Mod: ME

## 2022-06-22 PROCEDURE — 87077 CULTURE AEROBIC IDENTIFY: CPT

## 2022-06-22 PROCEDURE — 85027 COMPLETE CBC AUTOMATED: CPT

## 2022-06-22 PROCEDURE — 84100 ASSAY OF PHOSPHORUS: CPT

## 2022-06-22 PROCEDURE — 80048 BASIC METABOLIC PNL TOTAL CA: CPT

## 2022-06-22 PROCEDURE — G1004: CPT

## 2022-06-22 PROCEDURE — U0003: CPT

## 2022-06-22 PROCEDURE — 84484 ASSAY OF TROPONIN QUANT: CPT

## 2022-06-22 RX ORDER — ALBUTEROL 90 UG/1
2 AEROSOL, METERED ORAL
Qty: 1 | Refills: 0
Start: 2022-06-22 | End: 2022-07-21

## 2022-06-22 RX ADMIN — Medication 60 MILLIGRAM(S): at 11:15

## 2022-06-22 RX ADMIN — Medication 5 MILLIGRAM(S): at 05:25

## 2022-06-22 RX ADMIN — Medication 75 MILLIGRAM(S): at 11:15

## 2022-06-22 RX ADMIN — Medication 60 MILLIGRAM(S): at 05:25

## 2022-06-22 RX ADMIN — Medication 1 TABLET(S): at 11:15

## 2022-06-22 RX ADMIN — Medication 500 MILLIGRAM(S): at 11:15

## 2022-06-22 RX ADMIN — RIVAROXABAN 10 MILLIGRAM(S): KIT at 11:15

## 2022-06-22 RX ADMIN — Medication 50 MILLIGRAM(S): at 05:25

## 2022-06-24 PROBLEM — Z00.00 ENCOUNTER FOR PREVENTIVE HEALTH EXAMINATION: Status: ACTIVE | Noted: 2022-06-24

## 2022-06-28 ENCOUNTER — APPOINTMENT (OUTPATIENT)
Dept: PULMONOLOGY | Facility: CLINIC | Age: 87
End: 2022-06-28

## 2022-07-04 ENCOUNTER — EMERGENCY (EMERGENCY)
Facility: HOSPITAL | Age: 87
LOS: 1 days | Discharge: HOME | End: 2022-07-04
Attending: EMERGENCY MEDICINE
Payer: MEDICARE

## 2022-07-04 VITALS
HEART RATE: 76 BPM | TEMPERATURE: 98 F | DIASTOLIC BLOOD PRESSURE: 91 MMHG | RESPIRATION RATE: 18 BRPM | SYSTOLIC BLOOD PRESSURE: 156 MMHG | OXYGEN SATURATION: 97 %

## 2022-07-04 VITALS
TEMPERATURE: 98 F | SYSTOLIC BLOOD PRESSURE: 112 MMHG | WEIGHT: 164.91 LBS | OXYGEN SATURATION: 99 % | DIASTOLIC BLOOD PRESSURE: 64 MMHG | HEART RATE: 80 BPM | HEIGHT: 74 IN | RESPIRATION RATE: 20 BRPM

## 2022-07-04 LAB
ALBUMIN SERPL ELPH-MCNC: 3 G/DL — LOW (ref 3.3–5.2)
ALP SERPL-CCNC: 81 U/L — SIGNIFICANT CHANGE UP (ref 40–120)
ALT FLD-CCNC: 10 U/L — SIGNIFICANT CHANGE UP
ANION GAP SERPL CALC-SCNC: 8 MMOL/L — SIGNIFICANT CHANGE UP (ref 5–17)
APPEARANCE UR: CLEAR — SIGNIFICANT CHANGE UP
APTT BLD: 27.7 SEC — SIGNIFICANT CHANGE UP (ref 27.5–35.5)
AST SERPL-CCNC: 17 U/L — SIGNIFICANT CHANGE UP
BILIRUB SERPL-MCNC: 0.6 MG/DL — SIGNIFICANT CHANGE UP (ref 0.4–2)
BILIRUB UR-MCNC: NEGATIVE — SIGNIFICANT CHANGE UP
BUN SERPL-MCNC: 15.2 MG/DL — SIGNIFICANT CHANGE UP (ref 8–20)
CALCIUM SERPL-MCNC: 8.4 MG/DL — LOW (ref 8.6–10.2)
CHLORIDE SERPL-SCNC: 104 MMOL/L — SIGNIFICANT CHANGE UP (ref 98–107)
CK SERPL-CCNC: 39 U/L — SIGNIFICANT CHANGE UP (ref 30–200)
CO2 SERPL-SCNC: 26 MMOL/L — SIGNIFICANT CHANGE UP (ref 22–29)
COLOR SPEC: YELLOW — SIGNIFICANT CHANGE UP
CREAT SERPL-MCNC: 1.04 MG/DL — SIGNIFICANT CHANGE UP (ref 0.5–1.3)
DIFF PNL FLD: ABNORMAL
EGFR: 69 ML/MIN/1.73M2 — SIGNIFICANT CHANGE UP
EPI CELLS # UR: SIGNIFICANT CHANGE UP
GLUCOSE SERPL-MCNC: 96 MG/DL — SIGNIFICANT CHANGE UP (ref 70–99)
GLUCOSE UR QL: NEGATIVE MG/DL — SIGNIFICANT CHANGE UP
HCT VFR BLD CALC: 39.1 % — SIGNIFICANT CHANGE UP (ref 39–50)
HGB BLD-MCNC: 13 G/DL — SIGNIFICANT CHANGE UP (ref 13–17)
INR BLD: 0.99 RATIO — SIGNIFICANT CHANGE UP (ref 0.88–1.16)
KETONES UR-MCNC: NEGATIVE — SIGNIFICANT CHANGE UP
LEUKOCYTE ESTERASE UR-ACNC: NEGATIVE — SIGNIFICANT CHANGE UP
MCHC RBC-ENTMCNC: 32.1 PG — SIGNIFICANT CHANGE UP (ref 27–34)
MCHC RBC-ENTMCNC: 33.2 GM/DL — SIGNIFICANT CHANGE UP (ref 32–36)
MCV RBC AUTO: 96.5 FL — SIGNIFICANT CHANGE UP (ref 80–100)
NITRITE UR-MCNC: NEGATIVE — SIGNIFICANT CHANGE UP
NT-PROBNP SERPL-SCNC: 385 PG/ML — HIGH (ref 0–300)
PH UR: 6 — SIGNIFICANT CHANGE UP (ref 5–8)
PLATELET # BLD AUTO: 149 K/UL — LOW (ref 150–400)
POTASSIUM SERPL-MCNC: 4.1 MMOL/L — SIGNIFICANT CHANGE UP (ref 3.5–5.3)
POTASSIUM SERPL-SCNC: 4.1 MMOL/L — SIGNIFICANT CHANGE UP (ref 3.5–5.3)
PROT SERPL-MCNC: 5.5 G/DL — LOW (ref 6.6–8.7)
PROT UR-MCNC: NEGATIVE — SIGNIFICANT CHANGE UP
PROTHROM AB SERPL-ACNC: 11.5 SEC — SIGNIFICANT CHANGE UP (ref 10.5–13.4)
RBC # BLD: 4.05 M/UL — LOW (ref 4.2–5.8)
RBC # FLD: 15.4 % — HIGH (ref 10.3–14.5)
RBC CASTS # UR COMP ASSIST: SIGNIFICANT CHANGE UP /HPF (ref 0–4)
SODIUM SERPL-SCNC: 138 MMOL/L — SIGNIFICANT CHANGE UP (ref 135–145)
SP GR SPEC: 1.02 — SIGNIFICANT CHANGE UP (ref 1.01–1.02)
TROPONIN T SERPL-MCNC: <0.01 NG/ML — SIGNIFICANT CHANGE UP (ref 0–0.06)
UROBILINOGEN FLD QL: NEGATIVE MG/DL — SIGNIFICANT CHANGE UP
WBC # BLD: 9.6 K/UL — SIGNIFICANT CHANGE UP (ref 3.8–10.5)
WBC # FLD AUTO: 9.6 K/UL — SIGNIFICANT CHANGE UP (ref 3.8–10.5)
WBC UR QL: NEGATIVE /HPF — SIGNIFICANT CHANGE UP (ref 0–5)

## 2022-07-04 PROCEDURE — 99285 EMERGENCY DEPT VISIT HI MDM: CPT | Mod: 25

## 2022-07-04 PROCEDURE — 82550 ASSAY OF CK (CPK): CPT

## 2022-07-04 PROCEDURE — 70450 CT HEAD/BRAIN W/O DYE: CPT | Mod: MA

## 2022-07-04 PROCEDURE — 36415 COLL VENOUS BLD VENIPUNCTURE: CPT

## 2022-07-04 PROCEDURE — 85610 PROTHROMBIN TIME: CPT

## 2022-07-04 PROCEDURE — 80053 COMPREHEN METABOLIC PANEL: CPT

## 2022-07-04 PROCEDURE — 93010 ELECTROCARDIOGRAM REPORT: CPT

## 2022-07-04 PROCEDURE — 87086 URINE CULTURE/COLONY COUNT: CPT

## 2022-07-04 PROCEDURE — 93005 ELECTROCARDIOGRAM TRACING: CPT

## 2022-07-04 PROCEDURE — 85730 THROMBOPLASTIN TIME PARTIAL: CPT

## 2022-07-04 PROCEDURE — 84484 ASSAY OF TROPONIN QUANT: CPT

## 2022-07-04 PROCEDURE — 81001 URINALYSIS AUTO W/SCOPE: CPT

## 2022-07-04 PROCEDURE — 99285 EMERGENCY DEPT VISIT HI MDM: CPT | Mod: GC

## 2022-07-04 PROCEDURE — 85027 COMPLETE CBC AUTOMATED: CPT

## 2022-07-04 PROCEDURE — 70486 CT MAXILLOFACIAL W/O DYE: CPT | Mod: 26,MH

## 2022-07-04 PROCEDURE — 70486 CT MAXILLOFACIAL W/O DYE: CPT | Mod: MA

## 2022-07-04 PROCEDURE — 83880 ASSAY OF NATRIURETIC PEPTIDE: CPT

## 2022-07-04 PROCEDURE — 71045 X-RAY EXAM CHEST 1 VIEW: CPT | Mod: 26

## 2022-07-04 PROCEDURE — 70450 CT HEAD/BRAIN W/O DYE: CPT | Mod: 26

## 2022-07-04 PROCEDURE — 72125 CT NECK SPINE W/O DYE: CPT | Mod: MA

## 2022-07-04 PROCEDURE — 71045 X-RAY EXAM CHEST 1 VIEW: CPT

## 2022-07-04 PROCEDURE — 72125 CT NECK SPINE W/O DYE: CPT | Mod: 26,MA

## 2022-07-04 RX ORDER — SODIUM CHLORIDE 9 MG/ML
500 INJECTION INTRAMUSCULAR; INTRAVENOUS; SUBCUTANEOUS ONCE
Refills: 0 | Status: COMPLETED | OUTPATIENT
Start: 2022-07-04 | End: 2022-07-04

## 2022-07-04 RX ADMIN — SODIUM CHLORIDE 500 MILLILITER(S): 9 INJECTION INTRAMUSCULAR; INTRAVENOUS; SUBCUTANEOUS at 17:36

## 2022-07-04 NOTE — ED PROVIDER NOTE - PROGRESS NOTE DETAILS
JK - as per Janet, patient was hospitalized for covid in May and for PNA last month and has been feeling weak with declining mental status since returning to the facility as well as urinary incontinence; patient was found on the ground today after an unwitnessed fall. Priority CT ordered, vss, resting comfortably currently. JK - CT scans, CXR, UA, troponin, labs WNL. Patient vss, resting comfortably, in no apparent distress. Tolerating PO, ambulating on his own. Janet at [facility] contacted about patient status and workup; patient ready and agreeable to DC with return precautions.

## 2022-07-04 NOTE — ED PROVIDER NOTE - ATTENDING CONTRIBUTION TO CARE
I, Zeyad Ballesteros, performed the initial face to face bedside interview with this patient regarding history of present illness, review of symptoms and relevant past medical, social and family history.  I completed an independent physical examination.  I was the initial provider who evaluated this patient. I have signed out the follow up of any pending tests (i.e. labs, radiological studies) to the resident.  I have communicated the patient’s plan of care and disposition with the resident.

## 2022-07-04 NOTE — ED PROVIDER NOTE - CLINICAL SUMMARY MEDICAL DECISION MAKING FREE TEXT BOX
89 yo male with PMHx dementia, HLD, MDD, insomnia, HTN, RA, presenting from assisted living facility after unwitnessed fall. No blood thinners, denies LOC, vss, no FND, aaox2 at baseline as per facility with history of dementia, moving all extremities equally, GCS 15, no other signs of trauma. Will get CT to r/o fx vs. bleed, check labs and coags, troponin to r/o ACS, UA to r/o metabolic encephalopathy secondary to UTI. Will continue to monitor.

## 2022-07-04 NOTE — ED ADULT NURSE NOTE - OBJECTIVE STATEMENT
89 yom presents to ed s/p fall at nursing home  unknown loc denies blood thinners. has multiple skin contusions. with dressings intact.

## 2022-07-04 NOTE — ED ADULT TRIAGE NOTE - CHIEF COMPLAINT QUOTE
pt a+ox3, BIBA s/p unwitnessed fall from NH. pt offers no complaints, dressings noted to bilat forearm, bleeding controlled.

## 2022-07-04 NOTE — ED PROVIDER NOTE - OBJECTIVE STATEMENT
88 yo male pmh dementia,  past covid infection , pneumonia comes to ed  from [facility] [place]  found on the floor with unwitnesssed fall;   as per staff , pt noted generalized weakness and urinary frequency; pt noted with bandages to both elbows; pt presently denies chest pain , abd pain , or back pain

## 2022-07-04 NOTE — ED CLERICAL - NS ED CLERK NOTE PRE-ARRIVAL INFORMATION; ADDITIONAL PRE-ARRIVAL INFORMATION
This patient is enrolled in a readmission reduction program and has active care navigation. This patient can be followed up by the care navigation team within 24 hours. To arrange close follow-up or to obtain additional clinical information about this patient, please call the contact number above. Please speak with the [facility] ED Case Manager for assistance with discharge planning

## 2022-07-04 NOTE — ED PROVIDER NOTE - NSFOLLOWUPINSTRUCTIONS_ED_ALL_ED_FT
Fall Prevention in the Home, Adult      Falls can cause injuries and can affect people from all age groups. There are many simple things that you can do to make your home safe and to help prevent falls. Ask for help when making these changes, if needed.      What actions can I take to prevent falls?    General instructions     •Use good lighting in all rooms. Replace any light bulbs that burn out.      •Turn on lights if it is dark. Use night-lights.      •Place frequently used items in easy-to-reach places. Lower the shelves around your home if necessary.      •Set up furniture so that there are clear paths around it. Avoid moving your furniture around.      •Remove throw rugs and other tripping hazards from the floor.      •Avoid walking on wet floors.      •Fix any uneven floor surfaces.      •Add color or contrast paint or tape to grab bars and handrails in your home. Place contrasting color strips on the first and last steps of stairways.      •When you use a stepladder, make sure that it is completely opened and that the sides are firmly locked. Have someone hold the ladder while you are using it. Do not climb a closed stepladder.      •Be aware of any and all pets.        What can I do in the bathroom?                   •Keep the floor dry. Immediately clean up any water that spills onto the floor.      •Remove soap buildup in the tub or shower on a regular basis.      •Use non-skid mats or decals on the floor of the tub or shower.      •Attach bath mats securely with double-sided, non-slip rug tape.      •If you need to sit down while you are in the shower, use a plastic, non-slip stool.      •Install grab bars by the toilet and in the tub and shower. Do not use towel bars as grab bars.      What can I do in the bedroom?     •Make sure that a bedside light is easy to reach.      • Do not use oversized bedding that drapes onto the floor.      •Have a firm chair that has side arms to use for getting dressed.      What can I do in the kitchen?     •Clean up any spills right away.      •If you need to reach for something above you, use a sturdy step stool that has a grab bar.      •Keep electrical cables out of the way.      • Do not use floor polish or wax that makes floors slippery. If you must use wax, make sure that it is non-skid floor wax.      What can I do in the stairways?     • Do not leave any items on the stairs.      •Make sure that you have a light switch at the top of the stairs and the bottom of the stairs. Have them installed if you do not have them.      •Make sure that there are handrails on both sides of the stairs. Fix handrails that are broken or loose. Make sure that handrails are as long as the stairways.      •Install non-slip stair treads on all stairs in your home.      •Avoid having throw rugs at the top or bottom of stairways, or secure the rugs with carpet tape to prevent them from moving.      •Choose a carpet design that does not hide the edge of steps on the stairway.      •Check any carpeting to make sure that it is firmly attached to the stairs. Fix any carpet that is loose or worn.      What can I do on the outside of my home?     •Use bright outdoor lighting.      •Regularly repair the edges of walkways and driveways and fix any cracks.      •Remove high doorway thresholds.      •Trim any shrubbery on the main path into your home.      •Regularly check that handrails are securely fastened and in good repair. Both sides of any steps should have handrails.      •Install guardrails along the edges of any raised decks or porches.      •Clear walkways of debris and clutter, including tools and rocks.      •Have leaves, snow, and ice cleared regularly.      •Use sand or salt on walkways during winter months.      •In the garage, clean up any spills right away, including grease or oil spills.      What other actions can I take?     •Wear closed-toe shoes that fit well and support your feet. Wear shoes that have rubber soles or low heels.      •Use mobility aids as needed, such as canes, walkers, scooters, and crutches.      •Review your medicines with your health care provider. Some medicines can cause dizziness or changes in blood pressure, which increase your risk of falling.      Talk with your health care provider about other ways that you can decrease your risk of falls. This may include working with a physical therapist or  to improve your strength, balance, and endurance.      Where to find more information    •Centers for Disease Control and Prevention, STEADI: https://www.cdc.gov      •National [place] [employer] A[place]: https://gv2ysvs.rosibel.nih.gov        Contact a health care provider if:    •You are afraid of falling at home.      •You feel weak, drowsy, or dizzy at home.      •You fall at home.        Summary    •There are many simple things that you can do to make your home safe and to help prevent falls.      •Ways to make your home safe include removing tripping hazards and installing grab bars in the bathroom.      •Ask for help when making these changes in your home.      This information is not intended to replace advice given to you by your health care provider. Make sure you discuss any questions you have with your health care provider.

## 2022-07-04 NOTE — ED PROVIDER NOTE - PATIENT PORTAL LINK FT
You can access the FollowMyHealth Patient Portal offered by [facility] by registering at the following website: [email or web address]. By joining [email or web address] FollowMyHealth portal, you will also be able to view your health information using other applications (apps) compatible with our system.

## 2022-07-04 NOTE — ED PROVIDER NOTE - PHYSICAL EXAMINATION
Alert, and in no apparent distress. Pt is normocephalic, atraumatic.  Pupils are equal, round, lips pink, moist mucous membranes, tongue midline. Neck supple.   Lungs clear to auscultation. Heart regular rate and rhythm, .  Abdomen is soft, nontender, no pulsatile mass, no masses,   Non-focal sensory, 5 out of 5 motor strength, no dysmetria, fluent, goal directed speech. CN2 to 12 intact. Skin without rash,   Normal mentation, does not appear agitated

## 2022-07-05 ENCOUNTER — EMERGENCY (EMERGENCY)
Facility: HOSPITAL | Age: 87
LOS: 1 days | Discharge: HOME | End: 2022-07-05
Attending: EMERGENCY MEDICINE
Payer: MEDICARE

## 2022-07-05 VITALS
HEART RATE: 81 BPM | OXYGEN SATURATION: 97 % | SYSTOLIC BLOOD PRESSURE: 154 MMHG | TEMPERATURE: 98 F | DIASTOLIC BLOOD PRESSURE: 89 MMHG | HEIGHT: 74 IN | RESPIRATION RATE: 20 BRPM | WEIGHT: 149.91 LBS

## 2022-07-05 LAB
ALBUMIN SERPL ELPH-MCNC: 3.1 G/DL — LOW (ref 3.3–5.2)
ALP SERPL-CCNC: 77 U/L — SIGNIFICANT CHANGE UP (ref 40–120)
ALT FLD-CCNC: 11 U/L — SIGNIFICANT CHANGE UP
ANION GAP SERPL CALC-SCNC: 10 MMOL/L — SIGNIFICANT CHANGE UP (ref 5–17)
APPEARANCE UR: CLEAR — SIGNIFICANT CHANGE UP
AST SERPL-CCNC: 17 U/L — SIGNIFICANT CHANGE UP
BACTERIA # UR AUTO: ABNORMAL
BASOPHILS # BLD AUTO: 0.02 K/UL — SIGNIFICANT CHANGE UP (ref 0–0.2)
BASOPHILS NFR BLD AUTO: 0.2 % — SIGNIFICANT CHANGE UP (ref 0–2)
BILIRUB SERPL-MCNC: 0.8 MG/DL — SIGNIFICANT CHANGE UP (ref 0.4–2)
BILIRUB UR-MCNC: NEGATIVE — SIGNIFICANT CHANGE UP
BUN SERPL-MCNC: 13.3 MG/DL — SIGNIFICANT CHANGE UP (ref 8–20)
CALCIUM SERPL-MCNC: 8.3 MG/DL — LOW (ref 8.6–10.2)
CHLORIDE SERPL-SCNC: 110 MMOL/L — HIGH (ref 98–107)
CO2 SERPL-SCNC: 26 MMOL/L — SIGNIFICANT CHANGE UP (ref 22–29)
COLOR SPEC: YELLOW — SIGNIFICANT CHANGE UP
CREAT SERPL-MCNC: 0.92 MG/DL — SIGNIFICANT CHANGE UP (ref 0.5–1.3)
CULTURE RESULTS: NO GROWTH — SIGNIFICANT CHANGE UP
DIFF PNL FLD: NEGATIVE — SIGNIFICANT CHANGE UP
EGFR: 80 ML/MIN/1.73M2 — SIGNIFICANT CHANGE UP
EOSINOPHIL # BLD AUTO: 0.04 K/UL — SIGNIFICANT CHANGE UP (ref 0–0.5)
EOSINOPHIL NFR BLD AUTO: 0.4 % — SIGNIFICANT CHANGE UP (ref 0–6)
EPI CELLS # UR: SIGNIFICANT CHANGE UP
GLUCOSE SERPL-MCNC: 78 MG/DL — SIGNIFICANT CHANGE UP (ref 70–99)
GLUCOSE UR QL: NEGATIVE MG/DL — SIGNIFICANT CHANGE UP
HCT VFR BLD CALC: 41 % — SIGNIFICANT CHANGE UP (ref 39–50)
HGB BLD-MCNC: 13.2 G/DL — SIGNIFICANT CHANGE UP (ref 13–17)
IMM GRANULOCYTES NFR BLD AUTO: 0.5 % — SIGNIFICANT CHANGE UP (ref 0–1.5)
KETONES UR-MCNC: ABNORMAL
LEUKOCYTE ESTERASE UR-ACNC: NEGATIVE — SIGNIFICANT CHANGE UP
LYMPHOCYTES # BLD AUTO: 0.71 K/UL — LOW (ref 1–3.3)
LYMPHOCYTES # BLD AUTO: 6.9 % — LOW (ref 13–44)
MAGNESIUM SERPL-MCNC: 1.9 MG/DL — SIGNIFICANT CHANGE UP (ref 1.6–2.6)
MCHC RBC-ENTMCNC: 31.2 PG — SIGNIFICANT CHANGE UP (ref 27–34)
MCHC RBC-ENTMCNC: 32.2 GM/DL — SIGNIFICANT CHANGE UP (ref 32–36)
MCV RBC AUTO: 96.9 FL — SIGNIFICANT CHANGE UP (ref 80–100)
MONOCYTES # BLD AUTO: 1.06 K/UL — HIGH (ref 0–0.9)
MONOCYTES NFR BLD AUTO: 10.3 % — SIGNIFICANT CHANGE UP (ref 2–14)
NEUTROPHILS # BLD AUTO: 8.41 K/UL — HIGH (ref 1.8–7.4)
NEUTROPHILS NFR BLD AUTO: 81.7 % — HIGH (ref 43–77)
NITRITE UR-MCNC: NEGATIVE — SIGNIFICANT CHANGE UP
PH UR: 6 — SIGNIFICANT CHANGE UP (ref 5–8)
PLATELET # BLD AUTO: 148 K/UL — LOW (ref 150–400)
POTASSIUM SERPL-MCNC: 4.3 MMOL/L — SIGNIFICANT CHANGE UP (ref 3.5–5.3)
POTASSIUM SERPL-SCNC: 4.3 MMOL/L — SIGNIFICANT CHANGE UP (ref 3.5–5.3)
PROT SERPL-MCNC: 5.3 G/DL — LOW (ref 6.6–8.7)
PROT UR-MCNC: 15
RAPID RVP RESULT: SIGNIFICANT CHANGE UP
RBC # BLD: 4.23 M/UL — SIGNIFICANT CHANGE UP (ref 4.2–5.8)
RBC # FLD: 15.3 % — HIGH (ref 10.3–14.5)
RBC CASTS # UR COMP ASSIST: SIGNIFICANT CHANGE UP /HPF (ref 0–4)
SARS-COV-2 RNA SPEC QL NAA+PROBE: SIGNIFICANT CHANGE UP
SODIUM SERPL-SCNC: 145 MMOL/L — SIGNIFICANT CHANGE UP (ref 135–145)
SP GR SPEC: 1.02 — SIGNIFICANT CHANGE UP (ref 1.01–1.02)
SPECIMEN SOURCE: SIGNIFICANT CHANGE UP
TROPONIN T SERPL-MCNC: <0.01 NG/ML — SIGNIFICANT CHANGE UP (ref 0–0.06)
UROBILINOGEN FLD QL: NEGATIVE MG/DL — SIGNIFICANT CHANGE UP
WBC # BLD: 10.29 K/UL — SIGNIFICANT CHANGE UP (ref 3.8–10.5)
WBC # FLD AUTO: 10.29 K/UL — SIGNIFICANT CHANGE UP (ref 3.8–10.5)
WBC UR QL: SIGNIFICANT CHANGE UP /HPF (ref 0–5)

## 2022-07-05 PROCEDURE — 72125 CT NECK SPINE W/O DYE: CPT | Mod: 26,MD

## 2022-07-05 PROCEDURE — 93010 ELECTROCARDIOGRAM REPORT: CPT

## 2022-07-05 PROCEDURE — 70450 CT HEAD/BRAIN W/O DYE: CPT | Mod: 26,MD

## 2022-07-05 PROCEDURE — 72192 CT PELVIS W/O DYE: CPT | Mod: 26,MD

## 2022-07-05 PROCEDURE — 99220: CPT | Mod: FS

## 2022-07-05 PROCEDURE — 71045 X-RAY EXAM CHEST 1 VIEW: CPT | Mod: 26

## 2022-07-05 RX ORDER — ATORVASTATIN CALCIUM 80 MG/1
20 TABLET, FILM COATED ORAL AT BEDTIME
Refills: 0 | Status: DISCONTINUED | OUTPATIENT
Start: 2022-07-05 | End: 2022-07-09

## 2022-07-05 RX ORDER — METOPROLOL TARTRATE 50 MG
50 TABLET ORAL DAILY
Refills: 0 | Status: DISCONTINUED | OUTPATIENT
Start: 2022-07-05 | End: 2022-07-09

## 2022-07-05 RX ORDER — DILTIAZEM HCL 120 MG
60 CAPSULE, EXT RELEASE 24 HR ORAL
Refills: 0 | Status: DISCONTINUED | OUTPATIENT
Start: 2022-07-05 | End: 2022-07-09

## 2022-07-05 RX ORDER — DONEPEZIL HYDROCHLORIDE 10 MG/1
10 TABLET, FILM COATED ORAL AT BEDTIME
Refills: 0 | Status: DISCONTINUED | OUTPATIENT
Start: 2022-07-05 | End: 2022-07-09

## 2022-07-05 RX ORDER — VENLAFAXINE HCL 75 MG
75 CAPSULE, EXT RELEASE 24 HR ORAL DAILY
Refills: 0 | Status: DISCONTINUED | OUTPATIENT
Start: 2022-07-05 | End: 2022-07-09

## 2022-07-05 RX ORDER — MIRTAZAPINE 45 MG/1
7.5 TABLET, ORALLY DISINTEGRATING ORAL AT BEDTIME
Refills: 0 | Status: DISCONTINUED | OUTPATIENT
Start: 2022-07-05 | End: 2022-07-09

## 2022-07-05 RX ORDER — TRAZODONE HCL 50 MG
100 TABLET ORAL AT BEDTIME
Refills: 0 | Status: DISCONTINUED | OUTPATIENT
Start: 2022-07-05 | End: 2022-07-09

## 2022-07-05 RX ADMIN — MIRTAZAPINE 7.5 MILLIGRAM(S): 45 TABLET, ORALLY DISINTEGRATING ORAL at 22:05

## 2022-07-05 RX ADMIN — DONEPEZIL HYDROCHLORIDE 10 MILLIGRAM(S): 10 TABLET, FILM COATED ORAL at 22:05

## 2022-07-05 RX ADMIN — Medication 100 MILLIGRAM(S): at 22:05

## 2022-07-05 RX ADMIN — ATORVASTATIN CALCIUM 20 MILLIGRAM(S): 80 TABLET, FILM COATED ORAL at 22:05

## 2022-07-05 NOTE — ED ADULT NURSE NOTE - CHIEF COMPLAINT QUOTE
BIBEMS from [facility] s/p fall. Pt found on floor this morning, unknown downtime/ loc. Abrasion to right and left arm. C-collar in place. Hx of Dementia (alert to self and place). MD called to bedside for eval.

## 2022-07-05 NOTE — ED CDU PROVIDER INITIAL DAY NOTE - NS ED ATTENDING STATEMENT MOD
This was a shared visit with the MELVIN. I reviewed and verified the documentation and independently performed the documented:

## 2022-07-05 NOTE — ED ADULT TRIAGE NOTE - CHIEF COMPLAINT QUOTE
BIBEMS from [facility] s/p fall. Pt found on floor this morning, unknown downtime/ loc. Abrasion to right and left arm. C-collar in place. Hx of Dementia (alert to self and place). MD called to bedside for eval.

## 2022-07-05 NOTE — ED CDU PROVIDER INITIAL DAY NOTE - OBJECTIVE STATEMENT
88 yo male PMHx dementia, recurrent falls, HTN, HLD, [place], prostate cancer presents to ED from [facility] s/p fall. Found down on ground for unknown period of time. Presented to [facility] ED 1 day ago for fall. Endorsing neck and left pelvis pain. No further complaints at this time.

## 2022-07-05 NOTE — PHYSICAL THERAPY INITIAL EVALUATION ADULT - ADDITIONAL COMMENTS
Pt poor historian, as per chart: Pt lives in [facility], will require follow up from case management.

## 2022-07-05 NOTE — PROVIDER CONTACT NOTE (OTHER) - ASSESSMENT
Pt received on stretcher. Pt returned to stretcher without signs and symptoms of distress, CBWR. Pt reports pre 0/10 pain, during 0/10 pain and post session 0/10 pain; nursing aware. Pt will benefit from skilled PT, PT will follow.

## 2022-07-05 NOTE — ED CDU PROVIDER INITIAL DAY NOTE - NSTIMEPROVIDERCAREINITIATE_GEN_ER
Medical Week 1 Survey      Responses   Facility patient discharged from?  [facility]   Does the patient have one of the following disease processes/diagnoses(primary or secondary)?  Other   Is there a successful TCM telephone encounter documented?  No   Week 1 attempt successful?  No   Rescheduled  Revoked   Revoke  Decline to participate [NO ANSWER, LEFT VM]          Juliana Santana LPN  
05-Jul-2022 09:44

## 2022-07-05 NOTE — ED CDU PROVIDER INITIAL DAY NOTE - MEDICAL DECISION MAKING DETAILS
88 yo male PMHx dementia, recurrent falls, HTN, HLD, [place], prostate cancer presents to ED from [facility] s/p fall. Imaging negative for acute fracture. PT evaluated, recommending JENNIFER placement.

## 2022-07-05 NOTE — PHYSICAL THERAPY INITIAL EVALUATION ADULT - LEVEL OF INDEPENDENCE: SUPINE/SIT, REHAB EVAL
moderate assist (50% patients effort) [No Acute Distress] : no acute distress [Well Nourished] : well nourished [Well Developed] : well developed [Normal Voice/Communication] : normal voice communication [Normal Sclera/Conjunctiva] : normal sclera/conjunctiva [EOMI] : extra ocular movement intact [Normal Outer Ear/Nose] : the ears and nose were normal in appearance [No JVD] : no jugular venous distention [Supple] : the neck was supple [No Respiratory Distress] : no respiratory distress [Clear to Auscultation] : lungs were clear to auscultation bilaterally [No Accessory Muscle Use] : no accessory muscle use [Normal Rate] : heart rate was normal  [Regular Rhythm] : with a regular rhythm [Normal S1, S2] : normal S1 and S2 [No Murmurs] : no murmurs heard [No Edema] : there was no peripheral edema [Breast Exam Declined] : patient declined to have breast exam done [Normal Bowel Sounds] : normal bowel sounds [Non Tender] : non-tender [Soft] : abdomen soft [Not Distended] : not distended [No CVA Tenderness] : no ~M costovertebral angle tenderness [No Spinal Tenderness] : no spinal tenderness [No Clubbing, Cyanosis] : no clubbing  or cyanosis of the fingernails [Cranial Nerves Intact] : cranial nerves 2-12 were intact [No Motor Deficits] : the motor exam was normal [No Gross Sensory Deficits] : no gross sensory deficits [Oriented x3] : oriented to person, place, and time [Normal Affect] : the affect was normal [Normal Mood] : the mood was normal [de-identified] : severe kyphoscoliosis [de-identified] : has severe kyphoscoliosis. [de-identified] : has keratotic horn of RLE that's pruritic.

## 2022-07-05 NOTE — ED CDU PROVIDER INITIAL DAY NOTE - ATTENDING APP SHARED VISIT CONTRIBUTION OF CARE
I, Jeffery Rowe, have personally performed a face to face diagnostic evaluation on this patient. I have reviewed the MELVIN note and agree with the history, exam and plan of care, except as noted.    88 yo dementia, recurrent falls, HTN, HLD, [place], prostate cancer p/w multiple fall from [facility]. no acute fracture found. seen by PT, pending JENNIFER placement.

## 2022-07-05 NOTE — ED CDU PROVIDER INITIAL DAY NOTE - NSICDXPASTMEDICALHX_GEN_ALL_CORE_FT
PAST MEDICAL HISTORY:  Dementia     H/O: depression     HLD (hyperlipidemia)     [place] (hard of hearing)     HTN (hypertension)     Prostate cancer

## 2022-07-05 NOTE — PHYSICAL THERAPY INITIAL EVALUATION ADULT - PERTINENT HX OF CURRENT PROBLEM, REHAB EVAL
89 y/o M hx of dementia, recurrent falls, COVID brought in from [place] for fall, found down on ground for unknown period of time, was just seen here at [facility] 1 day ago for fall.

## 2022-07-05 NOTE — ED CLERICAL - NS ED CLERK NOTE PRE-ARRIVAL INFORMATION; ADDITIONAL PRE-ARRIVAL INFORMATION
This patient is enrolled in a readmission reduction program and has active care navigation. This patient can be followed up by the care navigation team within 24 hours. To arrange close follow-up or to obtain additional clinical information about this patient, please call the contact number above. Please speak with the [facility] ED Case Manager for assistance with discharge planning

## 2022-07-05 NOTE — ED PROVIDER NOTE - CLINICAL SUMMARY MEDICAL DECISION MAKING FREE TEXT BOX
88 y/o M hx of dementia, recurrent falls, COVID brought in from [place] for fall.   abrasions over bilateral hands, bandaged. c-collar in place. L pelvis tenderness. ct head/c-spine/bony pelvis.   labs, PT eval for recurrent falls. reassess s/p ct/labs.

## 2022-07-05 NOTE — ED PROVIDER NOTE - PROGRESS NOTE DETAILS
attempted to reach [place] multiple times and unable to reach anyone regarding functional status at baseline leti called back, reports patient at baseline walks with michael

## 2022-07-05 NOTE — ED PROVIDER NOTE - PHYSICAL EXAMINATION
General: Well appearing male in no acute distress  HEENT: Normocephalic, atraumatic. Moist mucous membranes. Oropharynx clear. No lymphadenopathy.  Eyes: No scleral icterus. EOMI. CYNDI.  Neck:. Soft and supple. Full ROM without pain. No midline tenderness  Cardiac: Regular rate and regular rhythm. No murmurs, rubs, gallops. Peripheral pulses 2+ and symmetric. No LE edema.  Resp: Lungs CTAB. Speaking in full sentences. No wheezes, rales or rhonchi.  Abd: Soft, non-tender, non-distended. No guarding or rebound. No scars, masses, or lesions.  Back: Spine midline and non-tender. No CVA tenderness.    Skin: +abrasions over b/l hands   Neuro: AO x 1. Moves all extremities symmetrically. Motor strength and sensation grossly intact.

## 2022-07-05 NOTE — ED PROVIDER NOTE - OBJECTIVE STATEMENT
88 y/o M hx of dementia, recurrent falls, COVID brought in from [place] for fall. found down on ground for unknown period of time. was just seen here at [facility] 1 day ago for fall. endorsing neck/ L pelvis pain. priority ct called.

## 2022-07-05 NOTE — ED PROVIDER NOTE - IV ALTEPLASE EXCL REL HIDDEN
How Severe Is Your Skin Lesion?: moderate
treated_been_treated
Is This A New Presentation, Or A Follow-Up?: Skin Lesion
show

## 2022-07-05 NOTE — PROVIDER CONTACT NOTE (OTHER) - ACTION/TREATMENT ORDERED:
PT goals (to be achieved in 2 weeks): supine <> sit with min A, sit <> stand min A, amb 50 ft with RW and min A.

## 2022-07-05 NOTE — ED ADULT NURSE NOTE - OBJECTIVE STATEMENT
Pt with hx of dementia, BIBA from the [facility] for an unwitness Pt with hx of dementia, BIBA from the [facility] for an unwitnessed fall.  Pt was seen here yesterday for the same.  Pt noted to have multiple ecchymotic areas on his arms, and fingers in various stages of healing.  Resting on stretcher in NAD.

## 2022-07-05 NOTE — ED PROVIDER NOTE - ATTENDING CONTRIBUTION TO CARE
88 y/o M hx of dementia, recurrent falls, COVID brought in from [facility] assisted living for fall. found down on ground for unknown period of time. was just seen here at [facility] 1 day ago for fall. endorsing headache. Does not offer other complaints.   AP - will get CT imaging eval for traumatic injury. will need PT eval

## 2022-07-05 NOTE — ED PROCEDURE NOTE - PROCEDURE ADDITIONAL DETAILS
Peripheral IV access in the Emergency Department obtained under dynamic ultrasound guidance with dark nonpulsatile blood return.  Catheter was flushed afterwards without any resistance or resulting extravasation.  IV catheter confirmed in compressible vein after insertion.  20G in left AC

## 2022-07-06 VITALS
OXYGEN SATURATION: 97 % | SYSTOLIC BLOOD PRESSURE: 151 MMHG | DIASTOLIC BLOOD PRESSURE: 79 MMHG | RESPIRATION RATE: 17 BRPM | HEART RATE: 74 BPM | TEMPERATURE: 98 F

## 2022-07-06 LAB
CULTURE RESULTS: NO GROWTH — SIGNIFICANT CHANGE UP
SPECIMEN SOURCE: SIGNIFICANT CHANGE UP

## 2022-07-06 PROCEDURE — 99217: CPT

## 2022-07-06 PROCEDURE — 81001 URINALYSIS AUTO W/SCOPE: CPT

## 2022-07-06 PROCEDURE — 87086 URINE CULTURE/COLONY COUNT: CPT

## 2022-07-06 PROCEDURE — 70450 CT HEAD/BRAIN W/O DYE: CPT | Mod: MD

## 2022-07-06 PROCEDURE — 82550 ASSAY OF CK (CPK): CPT

## 2022-07-06 PROCEDURE — 93005 ELECTROCARDIOGRAM TRACING: CPT

## 2022-07-06 PROCEDURE — 84484 ASSAY OF TROPONIN QUANT: CPT

## 2022-07-06 PROCEDURE — G0378: CPT

## 2022-07-06 PROCEDURE — 36410 VNPNXR 3YR/> PHY/QHP DX/THER: CPT

## 2022-07-06 PROCEDURE — 36415 COLL VENOUS BLD VENIPUNCTURE: CPT

## 2022-07-06 PROCEDURE — 85025 COMPLETE CBC W/AUTO DIFF WBC: CPT

## 2022-07-06 PROCEDURE — 71045 X-RAY EXAM CHEST 1 VIEW: CPT

## 2022-07-06 PROCEDURE — 72192 CT PELVIS W/O DYE: CPT | Mod: MD

## 2022-07-06 PROCEDURE — 0225U NFCT DS DNA&RNA 21 SARSCOV2: CPT

## 2022-07-06 PROCEDURE — 80053 COMPREHEN METABOLIC PANEL: CPT

## 2022-07-06 PROCEDURE — 99285 EMERGENCY DEPT VISIT HI MDM: CPT | Mod: 25

## 2022-07-06 PROCEDURE — 83735 ASSAY OF MAGNESIUM: CPT

## 2022-07-06 PROCEDURE — 72125 CT NECK SPINE W/O DYE: CPT | Mod: MD

## 2022-07-06 RX ADMIN — ATORVASTATIN CALCIUM 20 MILLIGRAM(S): 80 TABLET, FILM COATED ORAL at 22:32

## 2022-07-06 RX ADMIN — MIRTAZAPINE 7.5 MILLIGRAM(S): 45 TABLET, ORALLY DISINTEGRATING ORAL at 22:32

## 2022-07-06 RX ADMIN — Medication 75 MILLIGRAM(S): at 11:55

## 2022-07-06 RX ADMIN — Medication 100 MILLIGRAM(S): at 22:32

## 2022-07-06 RX ADMIN — DONEPEZIL HYDROCHLORIDE 10 MILLIGRAM(S): 10 TABLET, FILM COATED ORAL at 22:32

## 2022-07-06 RX ADMIN — Medication 60 MILLIGRAM(S): at 05:53

## 2022-07-06 RX ADMIN — Medication 5 MILLIGRAM(S): at 05:53

## 2022-07-06 RX ADMIN — Medication 60 MILLIGRAM(S): at 11:55

## 2022-07-06 RX ADMIN — Medication 50 MILLIGRAM(S): at 05:53

## 2022-07-06 RX ADMIN — Medication 60 MILLIGRAM(S): at 01:09

## 2022-07-06 RX ADMIN — Medication 60 MILLIGRAM(S): at 16:47

## 2022-07-06 NOTE — ED CDU PROVIDER DISPOSITION NOTE - NSFOLLOWUPINSTRUCTIONS_ED_ALL_ED_FT
Skin Tear      A skin tear is a wound in which the top layers of skin peel off. This is a common problem for older people. It can also be a problem for people who take certain medicines for too long.    To repair the skin, your doctor may use:  •Tape.      •Skin tape (adhesive) strips.      A bandage (dressing) may also be placed over the tape or skin tape strips.      Follow these instructions at home:    Keep your wound clean   •Clean the wound as told by your doctor. You may be told to keep the wound dry for the first few days. If you are told to clean the wound:  •Wash the wound with mild soap and water, a wound cleanser, or a salt–water (saline) solution.      •If you use soap, rinse the wound with water to get all the soap off.      •Do not rub the wound dry. Pat the wound gently with a clean towel or let it air-dry.      •Change any bandage as told by your doctor. This includes changing the bandage if it gets wet, gets dirty, or starts to smell bad. To change your bandage:  •Wash your hands with soap and water for at least 20 seconds before and after you change your bandage. If you cannot use soap and water, use hand .      •Leave tape or skin tape strips alone unless you are told to take them off. You may trim the edges of the tape strips if they curl up.          Watch for signs of infection      Check your wound every day for signs of infection. Check for:  •Redness, swelling, or pain.      •More fluid or blood.      •Warmth.      •Pus or a bad smell.      Protect your wound     • Do not scratch or pick at the wound.      •Protect the injured area until it has healed.      Medicines     •Take or apply over-the-counter and prescription medicines only as told by your doctor.      •If you were prescribed an antibiotic medicine, take or apply it as told by your doctor. Do not stop using it even if your condition gets better.        General instructions      •Keep the bandage dry.      • Do not take baths, swim, use a hot tub, or do anything that puts your wound underwater. Ask your doctor about taking showers or sponge baths.      •Keep all follow-up visits.        Contact a doctor if:  •You have any of these signs of infection in your wound:   •Redness, swelling, or pain.      •More fluid or blood.      •Warmth.      •Pus or a bad smell.          Get help right away if:    •You have a red streak that goes away from the skin tear.      •You have a fever and chills, and your symptoms get worse all of a sudden.        Summary    •A skin tear is a wound in which the top layers of skin peel off.      •To repair the skin, your doctor may use tape or skin tape strips.      •Change any bandage as told by your doctor.      •Take or apply over-the-counter and prescription medicines only as told by your doctor.      •Contact a doctor if you have signs of infection.      This information is not intended to replace advice given to you by your health care provider. Make sure you discuss any questions you have with your health care provider.

## 2022-07-06 NOTE — ED CDU PROVIDER SUBSEQUENT DAY NOTE - MEDICAL DECISION MAKING DETAILS
89 yo male PMHx dementia, recurrent falls, HTN, HLD, [place], prostate cancer presents to ED from [facility] s/p fall. Imaging negative for acute fracture. PT evaluated, recommending JENNIFER placement.

## 2022-07-06 NOTE — ED CDU PROVIDER DISPOSITION NOTE - PATIENT PORTAL LINK FT
You can access the FollowMyHealth Patient Portal offered by [facility] by registering at the following website: [email or web address]. By joining [facility]’s FollowMyHealth portal, you will also be able to view your health information using other applications (apps) compatible with our system.

## 2022-07-06 NOTE — CHART NOTE - NSCHARTNOTEFT_GEN_A_CORE
SW Note: As per day handoff, pt is medically appropriate for return to [facility] ([facility] in [place]). Worker reached out to wellness department (Kristie). Worker confirmed that pt was to be accepted back and placed in memory unit, as per head of case management (Amy [phone]) stated, in lieu of going to a SNF (Son refused JENNIFER). Worker faxed over ED clinicals ([phone]). Worker contacted pts son (Good) to alert him that his father was returning back to [facility]. Worker contacted [facility] (Marie) and set up a toribio for return. NEAF created and uploaded into ACM. Transport letter left with transfer packet. RN made aware. No other SW needs.

## 2022-07-06 NOTE — ED ADULT NURSE REASSESSMENT NOTE - STATUS
SW consult for JENNIFER placement/awaiting consult
JENNIFER placement
Awaiting JENNIFER placement/awaiting discharge, no change
awaiting discharge, no change

## 2022-07-06 NOTE — ED CDU PROVIDER DISPOSITION NOTE - CLINICAL COURSE
Pt presented to the ED after unwitnessed fall, Pt placed in obs for placement, assisted living offered to take him back and place in dementia unite family amendable to plan, educated about when to return to the ED if needed. PT verbalizes that he understands all instructions and results. Pt informed that ED is open and available 24/7 365 days a yr, encouraged to return to the ED if they have any change in condition, or feel the need for revaluation.

## 2022-07-06 NOTE — ED CDU PROVIDER SUBSEQUENT DAY NOTE - NSICDXPASTMEDICALHX_GEN_ALL_CORE_FT
PAST MEDICAL HISTORY:  Dementia     H/O: depression     HLD (hyperlipidemia)     [place] (hard of hearing)     HTN (hypertension)     Prostate cancer

## 2022-07-06 NOTE — ED CDU PROVIDER SUBSEQUENT DAY NOTE - NSICDXNOFAMILYHX_GEN_ALL_ED
From: Katharina Gil  To: Christiano Brooks MD  Sent: 1/15/2017 1:33 PM CST  Subject: Refill    I need a refill on........NITRO   <-- Click to add NO pertinent Family History

## 2022-07-06 NOTE — ED ADULT NURSE REASSESSMENT NOTE - NS ED NURSE REASSESS COMMENT FT1
Obs PA made aware of large skin tear to right hand between 2nd and 3rd fingers, Obs PA in to see Pt and wound dressing applied, safety maintained.
patient tolerated PO meds well. awaiting ambulance to  and return to facility
Report received from ED RN. Patient seen and assessed at bedside. Patient is Alert and oriented x1. Pt in no apparent distress. Pt denies pain. PIV noted, intact & WNL. Plan of care reviewed. Call bell within reach. Safety maintained. Will continue to closely monitor.
Assumed care of the patient @0730. Pt A&Ox1. VSS afebrile. Pt resting comfortably at this time.  Patient in understanding of plan of care. Patient with no further questions for the RN. Resting in comfort. Call bell within reach and encouraged to use when assistance needed. Will continue to monitor.
care assumed from BRUCE Epps. patient AAO x 2. awaiting transfer back to [facility]. patient denies pain at present. side rails up. in NAD. multiple skin tears. skin friable and ecchymotic.

## 2022-07-06 NOTE — CHART NOTE - NSCHARTNOTEFT_GEN_A_CORE
SOCIAL WORK NOTE:  THIS WORKER RECEIVED NOTICE THAT PATIENT IS READY FOR TRANSFER TO [facility].  PATIENT IS FROM THE [facility] AND WILL REQUIRE JENNIFER.  DAPHNE AND CLINICALS WERE FORWARDED TO LOCAL SNFS FOR CONSIDERATION OF JENNIFER PLACEMENT. ONCE BED OFFERS OBTAINED, WILL CALL FAMILY TO REVIEW THE LIST.

## 2022-07-06 NOTE — ED ADULT NURSE REASSESSMENT NOTE - COMFORT CARE
meal provided/plan of care explained/po fluids offered/side rails up/wait time explained/warm blanket provided
plan of care explained/repositioned/side rails up

## 2022-07-06 NOTE — ED CDU PROVIDER DISPOSITION NOTE - DISPOSITION TYPE
Other (Free Text): Informed patient that the tightness that she is experiencing is normal. Explained to patient that overtime it will loosen.\\nDiscussed with patient to continue using the Silagen.\\nPatient is to massage her scar three times a day for 3 minutes. Detail Level: Zone DISCHARGE

## 2022-07-06 NOTE — ED CDU PROVIDER SUBSEQUENT DAY NOTE - PROGRESS NOTE DETAILS
Received call from RN. Patient noted to have skin tear on right hand between 2nd and 3rd digit likely 2/2 gripping bed rails. Wound dressed with Bacitracin, Xeroform, and gauze.

## 2022-07-14 ENCOUNTER — INPATIENT (INPATIENT)
Facility: HOSPITAL | Age: 87
LOS: 7 days | Discharge: NURSING, REHAB OR LONG-TERM CARE | DRG: 300 | End: 2022-07-22
Attending: STUDENT IN AN ORGANIZED HEALTH CARE EDUCATION/TRAINING PROGRAM | Admitting: STUDENT IN AN ORGANIZED HEALTH CARE EDUCATION/TRAINING PROGRAM
Payer: MEDICARE

## 2022-07-14 VITALS
RESPIRATION RATE: 16 BRPM | OXYGEN SATURATION: 99 % | DIASTOLIC BLOOD PRESSURE: 82 MMHG | HEART RATE: 97 BPM | WEIGHT: 190.04 LBS | SYSTOLIC BLOOD PRESSURE: 132 MMHG | HEIGHT: 74 IN | TEMPERATURE: 98 F

## 2022-07-14 PROBLEM — Z86.59 PERSONAL HISTORY OF OTHER MENTAL AND BEHAVIORAL DISORDERS: Chronic | Status: ACTIVE | Noted: 2022-07-05

## 2022-07-14 PROBLEM — H91.90 UNSPECIFIED HEARING LOSS, UNSPECIFIED EAR: Chronic | Status: ACTIVE | Noted: 2022-07-05

## 2022-07-14 LAB
ALBUMIN SERPL ELPH-MCNC: 2.9 G/DL — LOW (ref 3.3–5.2)
ALP SERPL-CCNC: 85 U/L — SIGNIFICANT CHANGE UP (ref 40–120)
ALT FLD-CCNC: 13 U/L — SIGNIFICANT CHANGE UP
ANION GAP SERPL CALC-SCNC: 9 MMOL/L — SIGNIFICANT CHANGE UP (ref 5–17)
ANISOCYTOSIS BLD QL: SLIGHT — SIGNIFICANT CHANGE UP
AST SERPL-CCNC: 17 U/L — SIGNIFICANT CHANGE UP
BASOPHILS # BLD AUTO: 0 K/UL — SIGNIFICANT CHANGE UP (ref 0–0.2)
BASOPHILS NFR BLD AUTO: 0 % — SIGNIFICANT CHANGE UP (ref 0–2)
BILIRUB SERPL-MCNC: 0.8 MG/DL — SIGNIFICANT CHANGE UP (ref 0.4–2)
BUN SERPL-MCNC: 19.2 MG/DL — SIGNIFICANT CHANGE UP (ref 8–20)
CALCIUM SERPL-MCNC: 8.3 MG/DL — LOW (ref 8.6–10.2)
CHLORIDE SERPL-SCNC: 101 MMOL/L — SIGNIFICANT CHANGE UP (ref 98–107)
CK SERPL-CCNC: 98 U/L — SIGNIFICANT CHANGE UP (ref 30–200)
CO2 SERPL-SCNC: 28 MMOL/L — SIGNIFICANT CHANGE UP (ref 22–29)
CREAT SERPL-MCNC: 1.11 MG/DL — SIGNIFICANT CHANGE UP (ref 0.5–1.3)
EGFR: 63 ML/MIN/1.73M2 — SIGNIFICANT CHANGE UP
ELLIPTOCYTES BLD QL SMEAR: SLIGHT — SIGNIFICANT CHANGE UP
EOSINOPHIL # BLD AUTO: 0 K/UL — SIGNIFICANT CHANGE UP (ref 0–0.5)
EOSINOPHIL NFR BLD AUTO: 0 % — SIGNIFICANT CHANGE UP (ref 0–6)
GIANT PLATELETS BLD QL SMEAR: PRESENT — SIGNIFICANT CHANGE UP
GLUCOSE SERPL-MCNC: 153 MG/DL — HIGH (ref 70–99)
HCT VFR BLD CALC: 35.7 % — LOW (ref 39–50)
HGB BLD-MCNC: 12 G/DL — LOW (ref 13–17)
LACTATE BLDV-MCNC: 1.5 MMOL/L — SIGNIFICANT CHANGE UP (ref 0.5–2)
LYMPHOCYTES # BLD AUTO: 0.2 K/UL — LOW (ref 1–3.3)
LYMPHOCYTES # BLD AUTO: 1.7 % — LOW (ref 13–44)
MACROCYTES BLD QL: SLIGHT — SIGNIFICANT CHANGE UP
MANUAL SMEAR VERIFICATION: SIGNIFICANT CHANGE UP
MCHC RBC-ENTMCNC: 32.3 PG — SIGNIFICANT CHANGE UP (ref 27–34)
MCHC RBC-ENTMCNC: 33.6 GM/DL — SIGNIFICANT CHANGE UP (ref 32–36)
MCV RBC AUTO: 96.2 FL — SIGNIFICANT CHANGE UP (ref 80–100)
MONOCYTES # BLD AUTO: 1.01 K/UL — HIGH (ref 0–0.9)
MONOCYTES NFR BLD AUTO: 8.7 % — SIGNIFICANT CHANGE UP (ref 2–14)
NEUTROPHILS # BLD AUTO: 10.44 K/UL — HIGH (ref 1.8–7.4)
NEUTROPHILS NFR BLD AUTO: 89.6 % — HIGH (ref 43–77)
OVALOCYTES BLD QL SMEAR: SLIGHT — SIGNIFICANT CHANGE UP
PLAT MORPH BLD: NORMAL — SIGNIFICANT CHANGE UP
PLATELET # BLD AUTO: 149 K/UL — LOW (ref 150–400)
POIKILOCYTOSIS BLD QL AUTO: SLIGHT — SIGNIFICANT CHANGE UP
POLYCHROMASIA BLD QL SMEAR: SLIGHT — SIGNIFICANT CHANGE UP
POTASSIUM SERPL-MCNC: 4.3 MMOL/L — SIGNIFICANT CHANGE UP (ref 3.5–5.3)
POTASSIUM SERPL-SCNC: 4.3 MMOL/L — SIGNIFICANT CHANGE UP (ref 3.5–5.3)
PROT SERPL-MCNC: 5.7 G/DL — LOW (ref 6.6–8.7)
RBC # BLD: 3.71 M/UL — LOW (ref 4.2–5.8)
RBC # FLD: 15.3 % — HIGH (ref 10.3–14.5)
RBC BLD AUTO: ABNORMAL
SCHISTOCYTES BLD QL AUTO: SLIGHT — SIGNIFICANT CHANGE UP
SODIUM SERPL-SCNC: 138 MMOL/L — SIGNIFICANT CHANGE UP (ref 135–145)
WBC # BLD: 11.65 K/UL — HIGH (ref 3.8–10.5)
WBC # FLD AUTO: 11.65 K/UL — HIGH (ref 3.8–10.5)

## 2022-07-14 PROCEDURE — 99285 EMERGENCY DEPT VISIT HI MDM: CPT | Mod: 25,GC

## 2022-07-14 PROCEDURE — 93971 EXTREMITY STUDY: CPT | Mod: 26,RT

## 2022-07-14 PROCEDURE — 36000 PLACE NEEDLE IN VEIN: CPT | Mod: GC

## 2022-07-14 PROCEDURE — 73130 X-RAY EXAM OF HAND: CPT | Mod: 26,RT

## 2022-07-14 PROCEDURE — 73090 X-RAY EXAM OF FOREARM: CPT | Mod: 26,LT

## 2022-07-14 PROCEDURE — 73110 X-RAY EXAM OF WRIST: CPT | Mod: 26,RT

## 2022-07-14 RX ORDER — VANCOMYCIN HCL 1 G
1000 VIAL (EA) INTRAVENOUS ONCE
Refills: 0 | Status: COMPLETED | OUTPATIENT
Start: 2022-07-14 | End: 2022-07-14

## 2022-07-14 RX ORDER — AZTREONAM 2 G
1000 VIAL (EA) INJECTION ONCE
Refills: 0 | Status: COMPLETED | OUTPATIENT
Start: 2022-07-14 | End: 2022-07-14

## 2022-07-14 RX ADMIN — Medication 250 MILLIGRAM(S): at 19:06

## 2022-07-14 RX ADMIN — Medication 50 MILLIGRAM(S): at 18:13

## 2022-07-14 RX ADMIN — Medication 1000 MILLIGRAM(S): at 18:38

## 2022-07-14 NOTE — ED PROVIDER NOTE - NSICDXPASTMEDICALHX_GEN_ALL_CORE_FT
PAST MEDICAL HISTORY:  Dementia     H/O: depression     HLD (hyperlipidemia)     [place] (hard of hearing)     HTN (hypertension)     Prostate cancer

## 2022-07-14 NOTE — ED PROVIDER NOTE - OBJECTIVE STATEMENT
88 y/o M pt w/ PMHx of dementia, HTN, arrythmia, HLD, RA was sent from the [facility] for R arm cellulitis. Per nursing home (spoke to Nalini) patient has had R hand wound for 5 days; pt has been taking doxycyline for the wound 100mg BID. Over the last 24 hours pt has developed increasing redness and swelling to the R arm. Nursing home denies fever, mental status changes, or any other complaints. Pt is allergic to penicillin. 89 y/o M pt w/ PMHx of dementia, HTN, arrythmia, HLD, RA was sent from the [facility] for R arm cellulitis. Per nursing home (spoke to Nalini) patient has had R hand wound for 5 days; pt has been taking doxycyline for the wound 100mg BID. Over the last 24 hours pt has developed increasing redness and swelling to the R arm. Nursing home denies fever, mental status changes, or any other complaints. Pt is allergic to penicillin.    Son Tre Gardner [phone]

## 2022-07-14 NOTE — ED PROVIDER NOTE - CLINICAL SUMMARY MEDICAL DECISION MAKING FREE TEXT BOX
89 y/o male pt w/ R arm cellulitis and failed outpatient antibiotics for R hand wound. Will provide IV abx, X-ray, labs, reassess. 88 y/o male pt w/ R arm cellulitis and failed outpatient antibiotics for R hand wound. Will provide IV abx, X-ray, labs, US, reassess.

## 2022-07-14 NOTE — ED CLERICAL - NS ED CLERK NOTE PRE-ARRIVAL INFORMATION; ADDITIONAL PRE-ARRIVAL INFORMATION
This patient is enrolled in a readmission reduction program and has active care navigation. This patient can be followed up by the care navigation team within 24 hours. To arrange close follow-up or to obtain additional clinical information about this patient, please call the contact number above. Please speak with the [facility] ED Case Manager for assistance with discharge planning

## 2022-07-14 NOTE — ED PROVIDER NOTE - UNABLE TO OBTAIN
Dementia History is limited secondary to patients mental status ROS limited secondary to pts mental status

## 2022-07-14 NOTE — ED ADULT TRIAGE NOTE - CHIEF COMPLAINT QUOTE
Pt sent in from the [facility] for cellulitis to right arm and upper right thigh. As per facility, symptoms just started this morning. Pt's arm is swollen, red and wheeping.

## 2022-07-14 NOTE — ED PROVIDER NOTE - PHYSICAL EXAMINATION
SKIN: R hand wound between 2nd and 3rd digits with purulent drainage, redness extending up the forearm with weeping and tenderness. Second wound noted: ovoid ulcer on R upper arm measuring about 4x3cm. No hemorrhaging bullae, no crepitus noted. Pain not out of proportion to exam.

## 2022-07-14 NOTE — ED PROVIDER NOTE - ATTENDING CONTRIBUTION TO CARE
89yoM; with PMH signif for Dementia, HTN, HLD; now p/w R UE cellulitis. patient with multiple prior falls with skin tears. dx with cellulitis about 1 week ago, started on abx 5 days ago, orally, but erythema and pain and swelling worsening over past 2 days. with chills.  denies vomiting.  General:     NAD  Head:     NC/AT, EOMI, oral mucosa moist  Neck:     trachea midline  Lungs:     CTA b/l, no w/r/r  CVS:     S1S2, RRR, no m/g/r  Abd:     +BS, s/nt/nd, no organomegaly  Ext:    2+ radial and ulnar pulses bilaterally,  R UE: erythematous with ulceration in 2nd interweb space with bullae from edema over proximal arm.    neuro:  awake, answering questions.  A/P:  89yoM p/w R UE cellulitis with wound, failed outpatient abx  -labs, xray, ct to eval for nec fasc, us to eval for dvt, iv abx, admit 89yoM; with PMH signif for Dementia, HTN, HLD; now p/w R UE cellulitis. patient with multiple prior falls with skin tears. dx with cellulitis about 1 week ago, started on abx 5 days ago, orally, but erythema and pain and swelling worsening over past 2 days. with chills.  denies vomiting.  General:     NAD  Head:     NC/AT, EOMI, oral mucosa moist  Neck:     trachea midline  Lungs:     CTA b/l, no w/r/r  CVS:     S1S2, RRR, no m/g/r  Abd:     +BS, s/nt/nd, no organomegaly  Ext:    2+ radial and ulnar pulses bilaterally,  R UE: erythematous with ulceration in 2nd interweb space with bullae from edema over proximal arm.    neuro:  awake, answering questions.  A/P:  89yoM p/w R UE cellulitis with wound, failed outpatient abx  -labs, xray, ct to eval for nec fasc, us to eval for dvt, iv abx, admit.

## 2022-07-14 NOTE — ED ADULT NURSE NOTE - NSICDXPASTMEDICALHX_GEN_ALL_CORE_FT
PAST MEDICAL HISTORY:  Dementia     H/O: depression     HLD (hyperlipidemia)     [place] (hard of hearing)     HTN (hypertension)     Prostate cancer

## 2022-07-15 DIAGNOSIS — M06.9 RHEUMATOID ARTHRITIS, UNSPECIFIED: ICD-10-CM

## 2022-07-15 DIAGNOSIS — T14.8XXA OTHER INJURY OF UNSPECIFIED BODY REGION, INITIAL ENCOUNTER: ICD-10-CM

## 2022-07-15 DIAGNOSIS — Z02.9 ENCOUNTER FOR ADMINISTRATIVE EXAMINATIONS, UNSPECIFIED: ICD-10-CM

## 2022-07-15 DIAGNOSIS — L03.90 CELLULITIS, UNSPECIFIED: ICD-10-CM

## 2022-07-15 DIAGNOSIS — F03.90 UNSPECIFIED DEMENTIA WITHOUT BEHAVIORAL DISTURBANCE: ICD-10-CM

## 2022-07-15 DIAGNOSIS — E78.5 HYPERLIPIDEMIA, UNSPECIFIED: ICD-10-CM

## 2022-07-15 DIAGNOSIS — I10 ESSENTIAL (PRIMARY) HYPERTENSION: ICD-10-CM

## 2022-07-15 DIAGNOSIS — I82.621 ACUTE EMBOLISM AND THROMBOSIS OF DEEP VEINS OF RIGHT UPPER EXTREMITY: ICD-10-CM

## 2022-07-15 LAB
ALBUMIN SERPL ELPH-MCNC: 2.4 G/DL — LOW (ref 3.3–5.2)
ALP SERPL-CCNC: 63 U/L — SIGNIFICANT CHANGE UP (ref 40–120)
ALT FLD-CCNC: 11 U/L — SIGNIFICANT CHANGE UP
ANION GAP SERPL CALC-SCNC: 8 MMOL/L — SIGNIFICANT CHANGE UP (ref 5–17)
APPEARANCE UR: CLEAR — SIGNIFICANT CHANGE UP
APTT BLD: 164.3 SEC — CRITICAL HIGH (ref 27.5–35.5)
APTT BLD: 25.2 SEC — LOW (ref 27.5–35.5)
AST SERPL-CCNC: 13 U/L — SIGNIFICANT CHANGE UP
BACTERIA # UR AUTO: ABNORMAL
BILIRUB SERPL-MCNC: 0.6 MG/DL — SIGNIFICANT CHANGE UP (ref 0.4–2)
BILIRUB UR-MCNC: NEGATIVE — SIGNIFICANT CHANGE UP
BUN SERPL-MCNC: 14.1 MG/DL — SIGNIFICANT CHANGE UP (ref 8–20)
CALCIUM SERPL-MCNC: 7.9 MG/DL — LOW (ref 8.6–10.2)
CHLORIDE SERPL-SCNC: 103 MMOL/L — SIGNIFICANT CHANGE UP (ref 98–107)
CO2 SERPL-SCNC: 26 MMOL/L — SIGNIFICANT CHANGE UP (ref 22–29)
COLOR SPEC: YELLOW — SIGNIFICANT CHANGE UP
CREAT SERPL-MCNC: 0.76 MG/DL — SIGNIFICANT CHANGE UP (ref 0.5–1.3)
DIFF PNL FLD: ABNORMAL
EGFR: 86 ML/MIN/1.73M2 — SIGNIFICANT CHANGE UP
EPI CELLS # UR: SIGNIFICANT CHANGE UP
GLUCOSE SERPL-MCNC: 93 MG/DL — SIGNIFICANT CHANGE UP (ref 70–99)
GLUCOSE UR QL: NEGATIVE MG/DL — SIGNIFICANT CHANGE UP
HCT VFR BLD CALC: 34.9 % — LOW (ref 39–50)
HGB BLD-MCNC: 11.4 G/DL — LOW (ref 13–17)
INR BLD: 1.03 RATIO — SIGNIFICANT CHANGE UP (ref 0.88–1.16)
KETONES UR-MCNC: NEGATIVE — SIGNIFICANT CHANGE UP
LEUKOCYTE ESTERASE UR-ACNC: ABNORMAL
MCHC RBC-ENTMCNC: 31.1 PG — SIGNIFICANT CHANGE UP (ref 27–34)
MCHC RBC-ENTMCNC: 32.7 GM/DL — SIGNIFICANT CHANGE UP (ref 32–36)
MCV RBC AUTO: 95.1 FL — SIGNIFICANT CHANGE UP (ref 80–100)
NITRITE UR-MCNC: NEGATIVE — SIGNIFICANT CHANGE UP
PH UR: 7 — SIGNIFICANT CHANGE UP (ref 5–8)
PLATELET # BLD AUTO: 114 K/UL — LOW (ref 150–400)
POTASSIUM SERPL-MCNC: 3.5 MMOL/L — SIGNIFICANT CHANGE UP (ref 3.5–5.3)
POTASSIUM SERPL-SCNC: 3.5 MMOL/L — SIGNIFICANT CHANGE UP (ref 3.5–5.3)
PROT SERPL-MCNC: 4.8 G/DL — LOW (ref 6.6–8.7)
PROT UR-MCNC: 15
PROTHROM AB SERPL-ACNC: 12 SEC — SIGNIFICANT CHANGE UP (ref 10.5–13.4)
RAPID RVP RESULT: SIGNIFICANT CHANGE UP
RBC # BLD: 3.67 M/UL — LOW (ref 4.2–5.8)
RBC # FLD: 15 % — HIGH (ref 10.3–14.5)
RBC CASTS # UR COMP ASSIST: ABNORMAL /HPF (ref 0–4)
SARS-COV-2 RNA SPEC QL NAA+PROBE: SIGNIFICANT CHANGE UP
SODIUM SERPL-SCNC: 137 MMOL/L — SIGNIFICANT CHANGE UP (ref 135–145)
SP GR SPEC: 1.01 — SIGNIFICANT CHANGE UP (ref 1.01–1.02)
UROBILINOGEN FLD QL: NEGATIVE MG/DL — SIGNIFICANT CHANGE UP
WBC # BLD: 7.94 K/UL — SIGNIFICANT CHANGE UP (ref 3.8–10.5)
WBC # FLD AUTO: 7.94 K/UL — SIGNIFICANT CHANGE UP (ref 3.8–10.5)
WBC UR QL: ABNORMAL /HPF (ref 0–5)

## 2022-07-15 PROCEDURE — 73201 CT UPPER EXTREMITY W/DYE: CPT | Mod: 26,RT,ME

## 2022-07-15 PROCEDURE — 99223 1ST HOSP IP/OBS HIGH 75: CPT

## 2022-07-15 PROCEDURE — G1004: CPT

## 2022-07-15 RX ORDER — DONEPEZIL HYDROCHLORIDE 10 MG/1
10 TABLET, FILM COATED ORAL AT BEDTIME
Refills: 0 | Status: DISCONTINUED | OUTPATIENT
Start: 2022-07-15 | End: 2022-07-22

## 2022-07-15 RX ORDER — MORPHINE SULFATE 50 MG/1
1 CAPSULE, EXTENDED RELEASE ORAL EVERY 8 HOURS
Refills: 0 | Status: DISCONTINUED | OUTPATIENT
Start: 2022-07-15 | End: 2022-07-22

## 2022-07-15 RX ORDER — VANCOMYCIN HCL 1 G
1250 VIAL (EA) INTRAVENOUS ONCE
Refills: 0 | Status: COMPLETED | OUTPATIENT
Start: 2022-07-15 | End: 2022-07-15

## 2022-07-15 RX ORDER — ATORVASTATIN CALCIUM 80 MG/1
20 TABLET, FILM COATED ORAL AT BEDTIME
Refills: 0 | Status: DISCONTINUED | OUTPATIENT
Start: 2022-07-15 | End: 2022-07-22

## 2022-07-15 RX ORDER — HEPARIN SODIUM 5000 [USP'U]/ML
INJECTION INTRAVENOUS; SUBCUTANEOUS
Qty: 25000 | Refills: 0 | Status: DISCONTINUED | OUTPATIENT
Start: 2022-07-15 | End: 2022-07-15

## 2022-07-15 RX ORDER — HEPARIN SODIUM 5000 [USP'U]/ML
7500 INJECTION INTRAVENOUS; SUBCUTANEOUS ONCE
Refills: 0 | Status: COMPLETED | OUTPATIENT
Start: 2022-07-15 | End: 2022-07-15

## 2022-07-15 RX ORDER — VANCOMYCIN HCL 1 G
1000 VIAL (EA) INTRAVENOUS EVERY 12 HOURS
Refills: 0 | Status: DISCONTINUED | OUTPATIENT
Start: 2022-07-16 | End: 2022-07-18

## 2022-07-15 RX ORDER — HEPARIN SODIUM 5000 [USP'U]/ML
7500 INJECTION INTRAVENOUS; SUBCUTANEOUS EVERY 6 HOURS
Refills: 0 | Status: DISCONTINUED | OUTPATIENT
Start: 2022-07-15 | End: 2022-07-15

## 2022-07-15 RX ORDER — RIVAROXABAN 15 MG-20MG
15 KIT ORAL
Refills: 0 | Status: DISCONTINUED | OUTPATIENT
Start: 2022-07-15 | End: 2022-07-22

## 2022-07-15 RX ORDER — METOPROLOL TARTRATE 50 MG
50 TABLET ORAL DAILY
Refills: 0 | Status: DISCONTINUED | OUTPATIENT
Start: 2022-07-15 | End: 2022-07-22

## 2022-07-15 RX ORDER — HEPARIN SODIUM 5000 [USP'U]/ML
3500 INJECTION INTRAVENOUS; SUBCUTANEOUS EVERY 6 HOURS
Refills: 0 | Status: DISCONTINUED | OUTPATIENT
Start: 2022-07-15 | End: 2022-07-15

## 2022-07-15 RX ORDER — MIRTAZAPINE 45 MG/1
7.5 TABLET, ORALLY DISINTEGRATING ORAL AT BEDTIME
Refills: 0 | Status: DISCONTINUED | OUTPATIENT
Start: 2022-07-15 | End: 2022-07-22

## 2022-07-15 RX ORDER — VANCOMYCIN HCL 1 G
1250 VIAL (EA) INTRAVENOUS EVERY 12 HOURS
Refills: 0 | Status: DISCONTINUED | OUTPATIENT
Start: 2022-07-15 | End: 2022-07-15

## 2022-07-15 RX ORDER — VENLAFAXINE HCL 75 MG
75 CAPSULE, EXT RELEASE 24 HR ORAL DAILY
Refills: 0 | Status: DISCONTINUED | OUTPATIENT
Start: 2022-07-15 | End: 2022-07-22

## 2022-07-15 RX ORDER — VANCOMYCIN HCL 1 G
VIAL (EA) INTRAVENOUS
Refills: 0 | Status: DISCONTINUED | OUTPATIENT
Start: 2022-07-15 | End: 2022-07-15

## 2022-07-15 RX ORDER — TRAZODONE HCL 50 MG
100 TABLET ORAL AT BEDTIME
Refills: 0 | Status: DISCONTINUED | OUTPATIENT
Start: 2022-07-15 | End: 2022-07-22

## 2022-07-15 RX ORDER — BACITRACIN ZINC 500 UNIT/G
1 OINTMENT IN PACKET (EA) TOPICAL DAILY
Refills: 0 | Status: DISCONTINUED | OUTPATIENT
Start: 2022-07-15 | End: 2022-07-22

## 2022-07-15 RX ORDER — ACETAMINOPHEN 500 MG
650 TABLET ORAL EVERY 6 HOURS
Refills: 0 | Status: DISCONTINUED | OUTPATIENT
Start: 2022-07-15 | End: 2022-07-22

## 2022-07-15 RX ORDER — DILTIAZEM HCL 120 MG
60 CAPSULE, EXT RELEASE 24 HR ORAL
Refills: 0 | Status: DISCONTINUED | OUTPATIENT
Start: 2022-07-15 | End: 2022-07-22

## 2022-07-15 RX ADMIN — Medication 1 APPLICATION(S): at 13:09

## 2022-07-15 RX ADMIN — Medication 5 MILLIGRAM(S): at 21:51

## 2022-07-15 RX ADMIN — Medication 60 MILLIGRAM(S): at 13:09

## 2022-07-15 RX ADMIN — HEPARIN SODIUM 7500 UNIT(S): 5000 INJECTION INTRAVENOUS; SUBCUTANEOUS at 03:20

## 2022-07-15 RX ADMIN — Medication 75 MILLIGRAM(S): at 13:08

## 2022-07-15 RX ADMIN — Medication 60 MILLIGRAM(S): at 18:23

## 2022-07-15 RX ADMIN — HEPARIN SODIUM 1700 UNIT(S)/HR: 5000 INJECTION INTRAVENOUS; SUBCUTANEOUS at 03:22

## 2022-07-15 RX ADMIN — MIRTAZAPINE 7.5 MILLIGRAM(S): 45 TABLET, ORALLY DISINTEGRATING ORAL at 21:51

## 2022-07-15 RX ADMIN — Medication 100 MILLIGRAM(S): at 21:51

## 2022-07-15 RX ADMIN — Medication 166.67 MILLIGRAM(S): at 13:08

## 2022-07-15 RX ADMIN — RIVAROXABAN 15 MILLIGRAM(S): KIT at 18:23

## 2022-07-15 RX ADMIN — ATORVASTATIN CALCIUM 20 MILLIGRAM(S): 80 TABLET, FILM COATED ORAL at 21:59

## 2022-07-15 RX ADMIN — DONEPEZIL HYDROCHLORIDE 10 MILLIGRAM(S): 10 TABLET, FILM COATED ORAL at 21:51

## 2022-07-15 NOTE — H&P ADULT - NSHPLABSRESULTS_GEN_ALL_CORE
LABS:                         12.0   11.65 )-----------( 149      ( 2022 17:53 )             35.7     07-14    138  |  101  |  19.2  ----------------------------<  153<H>  4.3   |  28.0  |  1.11    Ca    8.3<L>      2022 17:53    TPro  5.7<L>  /  Alb  2.9<L>  /  TBili  0.8  /  DBili  x   /  AST  17  /  ALT  13  /  AlkPhos  85  07-14    PT/INR - ( 15 Jul 2022 02:29 )   PT: 12.0 sec;   INR: 1.03 ratio         PTT - ( 15 Jul 2022 02:29 )  PTT:25.2 sec  Urinalysis Basic - ( 15 Jul 2022 08:10 )    Color: Yellow / Appearance: Clear / S.010 / pH: x  Gluc: x / Ketone: Negative  / Bili: Negative / Urobili: Negative mg/dL   Blood: x / Protein: 15 / Nitrite: Negative   Leuk Esterase: Small / RBC: x / WBC x   Sq Epi: x / Non Sq Epi: x / Bacteria: x      CARDIAC MARKERS ( 2022 17:53 )  x     / x     / 98 U/L / x     / x              Records reviewed from prior hospitalization.  Labs reviewed remarkable for   EKG personally reviewed   CXR personally reviewed

## 2022-07-15 NOTE — H&P ADULT - HISTORY OF PRESENT ILLNESS
89 yr old M w/a PMH of Dementia, HTN, HLD, prostate CA, RA and hard of hearing  presents from [facility] for right arm swelling and cellulitis.  Overall patient has been declining he was admitted with COVID on 5/27 and then with pneumonia on 6/16 as well as fall on 7/4.  Initially he was in the assisted living portion of the facility and now has been moved to the memory unit.  Nurse states that yesterday they noticed redness and swelling of his right arm with draining serosanguinous in nature.  Patient was started on doxycycline without any improvement.  They do not describe any fevers at the facility.  Patient at baseline is oriented to self.

## 2022-07-15 NOTE — CONSULT NOTE ADULT - SUBJECTIVE AND OBJECTIVE BOX
[facility]  INFECTIOUS DISEASES at [place] and [place]  =======================================================                               Deandre Chow MD#  Ezekiel Braun MD*                                     Nico Coffey MD*    Kaylie Hooper MD*            Diplomates American Board of Internal Medicine & Infectious Diseases                # [place] Office - Appt - Tel  [phone] Fax [phone]                * [place] Office - Appt - Tel [phone] Fax [phone]                                  Hospital Consult line:  [phone]  =======================================================      [id number]  DOMONIQUE MAST    Patient is a poor historian. History obtained from the chart.     CC: Patient is a 89y old  Male who presents with a chief complaint of Redness/swelling (15 Jul 2022 08:20)      89y  Male with h/o Dementia, HTN, HLD, prostate CA, RA and hard of hearing. Patient presents from [facility] for right arm swelling and cellulitis. Overall patient has been declining he was admitted with COVID on  and then with pneumonia on  as well as fall on . Initially he was in the assisted living portion of the facility and now has been moved to the memory unit. While at the nursing home they noticed redness and swelling of his right arm with draining serosanguinous in nature. Patient was started on doxycycline without any improvement.  They do not describe any fevers at the facility. Sent to the ER. In the ER patient is afebrile, no leukocytosis. Started on Vancomycin. ID input requested.       Past Medical & Surgical Hx:  HTN (hypertension)  HLD (hyperlipidemia)  Dementia  Prostate cancer  H/O: depression  [place] (hard of hearing)  No significant past surgical history      Social Hx:  Lives in [place]       FAMILY HISTORY:  No pertinent family history in first degree relatives      Allergies  penicillin (Hives)       REVIEW OF SYSTEMS:  Limited. Patient is oriented to self.       Physical Exam:  GEN: NAD  HEENT: normocephalic and atraumatic. EOMI. PERRL.    NECK: Supple.   LUNGS: CTA B/L.  HEART: RRR  ABDOMEN: Soft, NT, ND.  +BS.    : No CVA tenderness  EXTREMITIES: + edema.  MSK: No joint swelling  NEUROLOGIC: No Focal Deficits   PSYCHIATRIC: Confused   SKIN: LUE with cellulitis, open wound, skin tear on chest, LUE with swelling       Height (cm): 188 ( @ 15:29)  Weight (kg): 91.1 (07-15 @ :27)  BMI (kg/m2): 25.8 (07-15 @ :27)  BSA (m2): 2.18 (07-15 @ :27)      Vitals:  T(F): 97.4 (15 Jul 2022 11:05), Max: 98.3 (2022 16:56)  HR: 62 (15 Jul 2022 11:05)  BP: 130/80 (15 Jul 2022 11:05)  RR: 18 (15 Jul 2022 11:05)  SpO2: 98% (15 Jul 2022 11:05) (90% - 99%)  temp max in last 48H T(F): , Max: 98.3 (22 @ 16:56)      Current Antibiotics:  vancomycin  IVPB      vancomycin  IVPB 1250 milliGRAM(s) IV Intermittent every 12 hours    Other medications:  atorvastatin 20 milliGRAM(s) Oral at bedtime  BACItracin   Ointment 1 Application(s) Topical daily  diltiazem    Tablet 60 milliGRAM(s) Oral four times a day  donepezil 10 milliGRAM(s) Oral at bedtime  metoprolol succinate ER 50 milliGRAM(s) Oral daily  mirtazapine 7.5 milliGRAM(s) Oral at bedtime  predniSONE   Tablet 5 milliGRAM(s) Oral daily  rivaroxaban 15 milliGRAM(s) Oral two times a day with meals  traZODone 100 milliGRAM(s) Oral at bedtime  venlafaxine XR. 75 milliGRAM(s) Oral daily                            11.4   7.94  )-----------( 114      ( 15 Jul 2022 09:07 )             34.9     07-15    137  |  103  |  14.1  ----------------------------<  93  3.5   |  26.0  |  0.76    Ca    7.9<L>      15 Jul 2022 09:07    TPro  4.8<L>  /  Alb  2.4<L>  /  TBili  0.6  /  DBili  x   /  AST  13  /  ALT  11  /  AlkPhos  63  07-15    RECENT CULTURES:  07-15 @ 07:02    RVP  NotDetec     @ 23:07 Clean Catch Clean Catch (Midstream)     No growth     @ 11:58    RVP  NotDetec     @ 01:04 Clean Catch Clean Catch (Midstream)     No growth      WBC Count: 7.94 K/uL (07-15-22 @ 09:07)  WBC Count: 11.65 K/uL (22 @ 17:53)    Creatinine, Serum: 0.76 mg/dL (07-15-22 @ 09:07)  Creatinine, Serum: 1.11 mg/dL (22 @ 17:53)    SARS-CoV-2: NotDetec (07-15-22 @ 07:02)  SARS-CoV-2: NotDetec (22 @ 11:58)  COVID-19 PCR: NotDetec (22 @ 08:15)  COVID-19 PCR: Detected (22 @ 13:00)    Urinalysis Basic - ( 15 Jul 2022 08:10 )    Color: Yellow / Appearance: Clear / S.010 / pH: x  Gluc: x / Ketone: Negative  / Bili: Negative / Urobili: Negative mg/dL   Blood: x / Protein: 15 / Nitrite: Negative   Leuk Esterase: Small / RBC: 3-5 /HPF / WBC 6-10 /HPF   Sq Epi: x / Non Sq Epi: Occasional / Bacteria: Occasional        < from: CT Upper Extremity w/ IV Cont, Right (07.15.22 @ 04:53) >  ACC: [id number] EXAM:  CT UPR EXT IC RT                          PROCEDURE DATE:  07/15/2022      INTERPRETATION:  CLINICAL INFORMATION: Right hand wound with right arm   cellulitis. Osteomyelitis is suspected.    TECHNIQUE: CT of the right upperextremity from the shoulder to the   fingers was performed in bone and soft tissue windows with coronal and   sagittal reformats after the uncomplicated administration of intravenous   contrast. 80 cc of Omnipaque 350 was administered and 10 cc was discarded.    COMPARISON: No similar prior studies are available for comparison.    FINDINGS:    Bone: No fracture or dislocation is demonstrated. 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 No osseous destruction or   periosteal reaction is seen to suggest a ananth osteomyelitis.    Soft tissues: Moderate subcutaneous inflammatory change and soft tissue   swelling is seen in the posterior distal half of the upper arm extending   circumferentially into the elbow, forearm and wrist which could be due to   subcutaneous edema versus a cellulitis. No associated rim-enhancing fluid   collection is seen to suggest an abscess. No subcutaneous gas is present.    Soleus: Subsegmental atelectasis is noted in the right lung base.    IMPRESSION:    1. No osseous destruction or periosteal reaction to suggest a ananth   osteomyelitis. If clinical suspicion persists for osteomyelitis, a   follow-up MRI would be a more sensitive and specific examination.  2. Moderate subcutaneous inflammatory change and soft tissue swelling in   the posterior distal half of the right upper arm extending   circumferentially into the elbow, forearm and wrist which could be due to   subcutaneous edema versus a cellulitis. No associated rim-enhancing fluid   collection to suggest an abscess. No subcutaneous gas.    --- End of Report ---  < end of copied text >

## 2022-07-15 NOTE — H&P ADULT - NSICDXPASTMEDICALHX_GEN_ALL_CORE_FT
PAST MEDICAL HISTORY:  Dementia     H/O: depression     HLD (hyperlipidemia)     [place] (hard of hearing)     HTN (hypertension)     Prostate cancer

## 2022-07-15 NOTE — CONSULT NOTE ADULT - ASSESSMENT
89y  Male with h/o Dementia, HTN, HLD, prostate CA, RA and hard of hearing. Patient presents from [facility] for right arm swelling and cellulitis. Overall patient has been declining he was admitted with COVID on 5/27 and then with pneumonia on 6/16 as well as fall on 7/4. Initially he was in the assisted living portion of the facility and now has been moved to the memory unit. While at the nursing home they noticed redness and swelling of his right arm with draining serosanguinous in nature. Patient was started on doxycycline without any improvement.  They do not describe any fevers at the facility. Sent to the ER. In the ER patient is afebrile, no leukocytosis. Started on Vancomycin.       Cellulitis   Leukocytosis       - Blood cultures pending  - RVP/COVID 19 PCR 7/15 negative   - Urine for legionella  - CT RUE reporting cellulitis   - UA negative for UTI   - Continue Vancomycin  - Monitor trough  - Monitor for Vancomycin toxicity   - Vancomycin required at this time pending culture results  - Follow up cultures  - Trend Fever  - Trend WBC      Thank you for allowing me to participate in the care of your patient.   Will Follow    d/w Dr Velez

## 2022-07-15 NOTE — ED ADULT NURSE REASSESSMENT NOTE - NSIMPLEMENTINTERV_GEN_ALL_ED
Implemented All Fall with Harm Risk Interventions:  [place] to call system. Call bell, personal items and telephone within reach. Instruct patient to call for assistance. Room bathroom lighting operational. Non-slip footwear when patient is off stretcher. Physically safe environment: no spills, clutter or unnecessary equipment. Stretcher in lowest position, wheels locked, appropriate side rails in place. Provide visual cue, wrist band, yellow gown, etc. Monitor gait and stability. Monitor for mental status changes and reorient to person, place, and time. Review medications for side effects contributing to fall risk. Reinforce activity limits and safety measures with patient and family. Provide visual clues: red socks.

## 2022-07-15 NOTE — H&P ADULT - PROBLEM SELECTOR PLAN 8
Will need PT evaluation  Patient at memory care unit over at [facility]   Patient is DNI but not DNR as per Mol[facility] form filled out on previous admission.

## 2022-07-15 NOTE — H&P ADULT - ASSESSMENT
89 yr old M w/a PMH of Dementia, HTN, HLD, prostate CA, RA and hard of hearing  presents from [facility] for right arm swelling and cellulitis

## 2022-07-15 NOTE — H&P ADULT - PROBLEM SELECTOR PLAN 2
Patient appears to have subsequent cellulitis along with numerous wounds  R hand wound between the 2nd and 3rd digits with drainage, redness extending up the forearm, warm to touch    Blood cultures collected  Spoke to ID will start Vanco for gram positive coverage/weight based

## 2022-07-15 NOTE — H&P ADULT - PROBLEM SELECTOR PLAN 3
Multiple skin tears on the right shoulder on chest wall  As per Bristal from lead placement   wound care consult ordered.

## 2022-07-15 NOTE — ED ADULT NURSE REASSESSMENT NOTE - NS ED NURSE REASSESS COMMENT FT1
Duc gutierrez'branden as ordered
pt noted to have right arm skin tear, also noted to have wound between right 2nd and third fingers. Wound noted on right chest covered with dressing unable to evaluate dressing in place. Provider Agustin Wang aware at bedside evaluated wounds
report given  to and accepted by  BRUCE HUSTON Pt transferred to CDU 1L:
received pt @ 730am, heparin drp ongoing pt on  monitoring. pt is alert to person, heparin drip ongoing 17ml/hr, repeat ptt @ 9:30 am, fall precautions in place

## 2022-07-16 LAB
ANION GAP SERPL CALC-SCNC: 8 MMOL/L — SIGNIFICANT CHANGE UP (ref 5–17)
BUN SERPL-MCNC: 16.3 MG/DL — SIGNIFICANT CHANGE UP (ref 8–20)
CALCIUM SERPL-MCNC: 8.1 MG/DL — LOW (ref 8.6–10.2)
CHLORIDE SERPL-SCNC: 105 MMOL/L — SIGNIFICANT CHANGE UP (ref 98–107)
CO2 SERPL-SCNC: 26 MMOL/L — SIGNIFICANT CHANGE UP (ref 22–29)
CREAT SERPL-MCNC: 0.86 MG/DL — SIGNIFICANT CHANGE UP (ref 0.5–1.3)
EGFR: 83 ML/MIN/1.73M2 — SIGNIFICANT CHANGE UP
GLUCOSE SERPL-MCNC: 126 MG/DL — HIGH (ref 70–99)
HCT VFR BLD CALC: 34.9 % — LOW (ref 39–50)
HGB BLD-MCNC: 11.2 G/DL — LOW (ref 13–17)
MCHC RBC-ENTMCNC: 30.9 PG — SIGNIFICANT CHANGE UP (ref 27–34)
MCHC RBC-ENTMCNC: 32.1 GM/DL — SIGNIFICANT CHANGE UP (ref 32–36)
MCV RBC AUTO: 96.4 FL — SIGNIFICANT CHANGE UP (ref 80–100)
PLATELET # BLD AUTO: 145 K/UL — LOW (ref 150–400)
POTASSIUM SERPL-MCNC: 4.5 MMOL/L — SIGNIFICANT CHANGE UP (ref 3.5–5.3)
POTASSIUM SERPL-SCNC: 4.5 MMOL/L — SIGNIFICANT CHANGE UP (ref 3.5–5.3)
RBC # BLD: 3.62 M/UL — LOW (ref 4.2–5.8)
RBC # FLD: 15.1 % — HIGH (ref 10.3–14.5)
SODIUM SERPL-SCNC: 139 MMOL/L — SIGNIFICANT CHANGE UP (ref 135–145)
WBC # BLD: 8.14 K/UL — SIGNIFICANT CHANGE UP (ref 3.8–10.5)
WBC # FLD AUTO: 8.14 K/UL — SIGNIFICANT CHANGE UP (ref 3.8–10.5)

## 2022-07-16 PROCEDURE — 99233 SBSQ HOSP IP/OBS HIGH 50: CPT

## 2022-07-16 RX ADMIN — DONEPEZIL HYDROCHLORIDE 10 MILLIGRAM(S): 10 TABLET, FILM COATED ORAL at 21:33

## 2022-07-16 RX ADMIN — MIRTAZAPINE 7.5 MILLIGRAM(S): 45 TABLET, ORALLY DISINTEGRATING ORAL at 21:32

## 2022-07-16 RX ADMIN — Medication 250 MILLIGRAM(S): at 00:36

## 2022-07-16 RX ADMIN — RIVAROXABAN 15 MILLIGRAM(S): KIT at 10:15

## 2022-07-16 RX ADMIN — Medication 100 MILLIGRAM(S): at 21:33

## 2022-07-16 RX ADMIN — Medication 75 MILLIGRAM(S): at 11:56

## 2022-07-16 RX ADMIN — Medication 250 MILLIGRAM(S): at 13:34

## 2022-07-16 RX ADMIN — Medication 5 MILLIGRAM(S): at 06:20

## 2022-07-16 RX ADMIN — Medication 50 MILLIGRAM(S): at 06:20

## 2022-07-16 RX ADMIN — ATORVASTATIN CALCIUM 20 MILLIGRAM(S): 80 TABLET, FILM COATED ORAL at 21:33

## 2022-07-16 RX ADMIN — Medication 60 MILLIGRAM(S): at 17:37

## 2022-07-16 RX ADMIN — RIVAROXABAN 15 MILLIGRAM(S): KIT at 17:36

## 2022-07-16 RX ADMIN — Medication 60 MILLIGRAM(S): at 00:36

## 2022-07-16 RX ADMIN — Medication 60 MILLIGRAM(S): at 11:56

## 2022-07-16 RX ADMIN — Medication 60 MILLIGRAM(S): at 06:20

## 2022-07-16 RX ADMIN — Medication 1 APPLICATION(S): at 11:57

## 2022-07-16 NOTE — PROGRESS NOTE ADULT - SUBJECTIVE AND OBJECTIVE BOX
Patient is a 89y old  Male who presents with a chief complaint of Redness/swelling (15 Jul 2022 14:21)      INTERVAL HPI/OVERNIGHT EVENTS: seen and examined. He is [place] but able to answer simple questions, not able to provide ROS     MEDICATIONS  (STANDING):  atorvastatin 20 milliGRAM(s) Oral at bedtime  BACItracin   Ointment 1 Application(s) Topical daily  diltiazem    Tablet 60 milliGRAM(s) Oral four times a day  donepezil 10 milliGRAM(s) Oral at bedtime  metoprolol succinate ER 50 milliGRAM(s) Oral daily  mirtazapine 7.5 milliGRAM(s) Oral at bedtime  predniSONE   Tablet 5 milliGRAM(s) Oral daily  rivaroxaban 15 milliGRAM(s) Oral two times a day with meals  traZODone 100 milliGRAM(s) Oral at bedtime  vancomycin  IVPB 1000 milliGRAM(s) IV Intermittent every 12 hours  venlafaxine XR. 75 milliGRAM(s) Oral daily    MEDICATIONS  (PRN):  acetaminophen     Tablet .. 650 milliGRAM(s) Oral every 6 hours PRN Temp greater or equal to 38C (100.4F), Mild Pain (1 - 3)  morphine  - Injectable 1 milliGRAM(s) IV Push every 8 hours PRN Severe Pain (7 - 10)      Allergies    penicillin (Hives)    Intolerances        REVIEW OF SYSTEMS:  CONSTITUTIONAL: No fever, weight loss, or fatigue  RESPIRATORY: No cough, wheezing, chills or hemoptysis; No shortness of breath  CARDIOVASCULAR: No chest pain, palpitations, dizziness, or leg swelling  GASTROINTESTINAL: No abdominal or epigastric pain. No nausea, vomiting, or hematemesis; No diarrhea or constipation. No melena or hematochezia.  NEUROLOGICAL: No headaches, memory loss, loss of strength, numbness, or tremors  MUSCULOSKELETAL: No joint pain or swelling; No muscle, back, or extremity pain      Vital Signs Last 24 Hrs  T(C): 36.4 (2022 08:53), Max: 37 (15 Jul 2022 23:30)  T(F): 97.5 (2022 08:53), Max: 98.6 (15 Jul 2022 23:30)  HR: 69 (2022 08:53) (69 - 92)  BP: 136/79 (2022 08:53) (132/64 - 159/83)  BP(mean): --  RR: 18 (2022 08:53) (16 - 20)  SpO2: 92% (2022 08:53) (92% - 98%)    Parameters below as of 2022 08:53  Patient On (Oxygen Delivery Method): nasal cannula        PHYSICAL EXAM:  GENERAL: NAD, laying in bed, HOD   HEAD:  Atraumatic, Normocephalic  EYES: EOMI, PERRLA, conjunctiva and sclera clear  NECK: Supple, No JVD  NERVOUS SYSTEM:  Alert & Oriented X0, No gross focal deficits  CHEST/LUNG: Clear to percussion bilaterally; No rales, rhonchi, wheezing, or rubs  HEART: Regular rate and rhythm; No murmurs, rubs, or gallops  ABDOMEN: Soft, Nontender, Nondistended; Bowel sounds present  EXTREMITIES:  extensive weeping skin on both arms, multiple skin tears, right arm read and warm to touch       LABS:                        11.2   8.14  )-----------( 145      ( 2022 07:08 )             34.9     07-16    139  |  105  |  16.3  ----------------------------<  126<H>  4.5   |  26.0  |  0.86    Ca    8.1<L>      2022 07:08    TPro  4.8<L>  /  Alb  2.4<L>  /  TBili  0.6  /  DBili  x   /  AST  13  /  ALT  11  /  AlkPhos  63  07-15    PT/INR - ( 15 Jul 2022 02:29 )   PT: 12.0 sec;   INR: 1.03 ratio         PTT - ( 15 Jul 2022 09:08 )  PTT:164.3 sec  Urinalysis Basic - ( 15 Jul 2022 08:10 )    Color: Yellow / Appearance: Clear / S.010 / pH: x  Gluc: x / Ketone: Negative  / Bili: Negative / Urobili: Negative mg/dL   Blood: x / Protein: 15 / Nitrite: Negative   Leuk Esterase: Small / RBC: 3-5 /HPF / WBC 6-10 /HPF   Sq Epi: x / Non Sq Epi: Occasional / Bacteria: Occasional      CAPILLARY BLOOD GLUCOSE          RADIOLOGY & ADDITIONAL TESTS:    Imaging Personally Reviewed:  [ ] YES  [ ] NO    Consultant(s) Notes Reviewed:  [ ] YES  [ ] NO    Care Discussed with Consultants/Other Providers [ ] YES  [ ] NO    Plan of Care discussed with Housestaff [x ]YES [ ] NO

## 2022-07-16 NOTE — PATIENT PROFILE ADULT - FALL HARM RISK - HARM RISK INTERVENTIONS

## 2022-07-17 LAB — VANCOMYCIN TROUGH SERPL-MCNC: 17.9 UG/ML — SIGNIFICANT CHANGE UP (ref 10–20)

## 2022-07-17 PROCEDURE — 99233 SBSQ HOSP IP/OBS HIGH 50: CPT

## 2022-07-17 RX ADMIN — MIRTAZAPINE 7.5 MILLIGRAM(S): 45 TABLET, ORALLY DISINTEGRATING ORAL at 22:20

## 2022-07-17 RX ADMIN — DONEPEZIL HYDROCHLORIDE 10 MILLIGRAM(S): 10 TABLET, FILM COATED ORAL at 22:21

## 2022-07-17 RX ADMIN — Medication 5 MILLIGRAM(S): at 05:32

## 2022-07-17 RX ADMIN — Medication 60 MILLIGRAM(S): at 18:33

## 2022-07-17 RX ADMIN — Medication 50 MILLIGRAM(S): at 05:32

## 2022-07-17 RX ADMIN — Medication 60 MILLIGRAM(S): at 12:56

## 2022-07-17 RX ADMIN — Medication 250 MILLIGRAM(S): at 00:18

## 2022-07-17 RX ADMIN — RIVAROXABAN 15 MILLIGRAM(S): KIT at 08:49

## 2022-07-17 RX ADMIN — Medication 1 APPLICATION(S): at 12:53

## 2022-07-17 RX ADMIN — Medication 250 MILLIGRAM(S): at 12:58

## 2022-07-17 RX ADMIN — Medication 60 MILLIGRAM(S): at 00:18

## 2022-07-17 RX ADMIN — Medication 60 MILLIGRAM(S): at 05:32

## 2022-07-17 RX ADMIN — ATORVASTATIN CALCIUM 20 MILLIGRAM(S): 80 TABLET, FILM COATED ORAL at 22:21

## 2022-07-17 RX ADMIN — Medication 75 MILLIGRAM(S): at 12:56

## 2022-07-17 RX ADMIN — Medication 100 MILLIGRAM(S): at 22:26

## 2022-07-17 RX ADMIN — RIVAROXABAN 15 MILLIGRAM(S): KIT at 18:34

## 2022-07-17 NOTE — PHYSICAL THERAPY INITIAL EVALUATION ADULT - PERTINENT HX OF CURRENT PROBLEM, REHAB EVAL
89 yr old M w/a PMH of Dementia, HTN, HLD, prostate CA, RA and hard of hearing  presents from [facility] for right arm swelling and cellulitis, found to have acute deep vein thrombosis (DVT) of right upper extremity.

## 2022-07-17 NOTE — PROGRESS NOTE ADULT - SUBJECTIVE AND OBJECTIVE BOX
Patient is a 89y old  Male who presents with a chief complaint of Redness/swelling (16 Jul 2022 12:18)      INTERVAL HPI/OVERNIGHT EVENTS: seen and examined.     MEDICATIONS  (STANDING):  atorvastatin 20 milliGRAM(s) Oral at bedtime  BACItracin   Ointment 1 Application(s) Topical daily  diltiazem    Tablet 60 milliGRAM(s) Oral four times a day  donepezil 10 milliGRAM(s) Oral at bedtime  metoprolol succinate ER 50 milliGRAM(s) Oral daily  mirtazapine 7.5 milliGRAM(s) Oral at bedtime  predniSONE   Tablet 5 milliGRAM(s) Oral daily  rivaroxaban 15 milliGRAM(s) Oral two times a day with meals  traZODone 100 milliGRAM(s) Oral at bedtime  vancomycin  IVPB 1000 milliGRAM(s) IV Intermittent every 12 hours  venlafaxine XR. 75 milliGRAM(s) Oral daily    MEDICATIONS  (PRN):  acetaminophen     Tablet .. 650 milliGRAM(s) Oral every 6 hours PRN Temp greater or equal to 38C (100.4F), Mild Pain (1 - 3)  morphine  - Injectable 1 milliGRAM(s) IV Push every 8 hours PRN Severe Pain (7 - 10)      Allergies    penicillin (Hives)    Intolerances        REVIEW OF SYSTEMS:  CONSTITUTIONAL: No fever, weight loss, or fatigue  RESPIRATORY: No cough, wheezing, chills or hemoptysis; No shortness of breath  CARDIOVASCULAR: No chest pain, palpitations, dizziness, or leg swelling  GASTROINTESTINAL: No abdominal or epigastric pain. No nausea, vomiting, or hematemesis; No diarrhea or constipation. No melena or hematochezia.  NEUROLOGICAL: No headaches, memory loss, loss of strength, numbness, or tremors  MUSCULOSKELETAL: No joint pain or swelling; No muscle, back, or extremity pain      Vital Signs Last 24 Hrs  T(C): 36.5 (17 Jul 2022 11:14), Max: 36.7 (16 Jul 2022 15:43)  T(F): 97.7 (17 Jul 2022 11:14), Max: 98 (16 Jul 2022 15:43)  HR: 68 (17 Jul 2022 11:14) (65 - 68)  BP: 155/75 (17 Jul 2022 11:14) (116/75 - 155/75)  BP(mean): --  RR: 19 (17 Jul 2022 11:14) (18 - 19)  SpO2: 98% (17 Jul 2022 11:14) (96% - 98%)    Parameters below as of 17 Jul 2022 11:14  Patient On (Oxygen Delivery Method): nasal cannula        PHYSICAL EXAM:  GENERAL: NAD,   HEAD:  Atraumatic, Normocephalic  EYES: EOMI, PERRLA, conjunctiva and sclera clear  NECK: Supple, No JVD, Normal thyroid  NERVOUS SYSTEM:  Alert & Oriented X3, No gross focal deficits  CHEST/LUNG: Clear to percussion bilaterally; No rales, rhonchi, wheezing, or rubs  HEART: Regular rate and rhythm; No murmurs, rubs, or gallops  ABDOMEN: Soft, Nontender, Nondistended; Bowel sounds present  EXTREMITIES:  No clubbing, cyanosis, or edema  SKIN: No rashes or lesions    LABS:                        11.2   8.14  )-----------( 145      ( 16 Jul 2022 07:08 )             34.9     07-16    139  |  105  |  16.3  ----------------------------<  126<H>  4.5   |  26.0  |  0.86    Ca    8.1<L>      16 Jul 2022 07:08          CAPILLARY BLOOD GLUCOSE          RADIOLOGY & ADDITIONAL TESTS:    Imaging Personally Reviewed:  [ ] YES  [ ] NO    Consultant(s) Notes Reviewed:  [ ] YES  [ ] NO    Care Discussed with Consultants/Other Providers [ ] YES  [ ] NO    Plan of Care discussed with Housestaff [ ]YES [ ] NO Patient is a 89y old  Male who presents with a chief complaint of Redness/swelling (16 Jul 2022 12:18)      INTERVAL HPI/OVERNIGHT EVENTS: seen and examined. Today he is more alert and talkative. Denies any pain or discomfort     MEDICATIONS  (STANDING):  atorvastatin 20 milliGRAM(s) Oral at bedtime  BACItracin   Ointment 1 Application(s) Topical daily  diltiazem    Tablet 60 milliGRAM(s) Oral four times a day  donepezil 10 milliGRAM(s) Oral at bedtime  metoprolol succinate ER 50 milliGRAM(s) Oral daily  mirtazapine 7.5 milliGRAM(s) Oral at bedtime  predniSONE   Tablet 5 milliGRAM(s) Oral daily  rivaroxaban 15 milliGRAM(s) Oral two times a day with meals  traZODone 100 milliGRAM(s) Oral at bedtime  vancomycin  IVPB 1000 milliGRAM(s) IV Intermittent every 12 hours  venlafaxine XR. 75 milliGRAM(s) Oral daily    MEDICATIONS  (PRN):  acetaminophen     Tablet .. 650 milliGRAM(s) Oral every 6 hours PRN Temp greater or equal to 38C (100.4F), Mild Pain (1 - 3)  morphine  - Injectable 1 milliGRAM(s) IV Push every 8 hours PRN Severe Pain (7 - 10)      Allergies    penicillin (Hives)    Intolerances        REVIEW OF SYSTEMS:  CONSTITUTIONAL: No fever, weight loss, or fatigue  RESPIRATORY: No cough, wheezing, chills or hemoptysis; No shortness of breath  CARDIOVASCULAR: No chest pain, palpitations, dizziness, or leg swelling  GASTROINTESTINAL: No abdominal or epigastric pain. No nausea, vomiting, or hematemesis; No diarrhea or constipation. No melena or hematochezia.  NEUROLOGICAL: No headaches, memory loss, loss of strength, numbness, or tremors  MUSCULOSKELETAL: No joint pain or swelling; No muscle, back, or extremity pain      Vital Signs Last 24 Hrs  T(C): 36.5 (17 Jul 2022 11:14), Max: 36.7 (16 Jul 2022 15:43)  T(F): 97.7 (17 Jul 2022 11:14), Max: 98 (16 Jul 2022 15:43)  HR: 68 (17 Jul 2022 11:14) (65 - 68)  BP: 155/75 (17 Jul 2022 11:14) (116/75 - 155/75)  BP(mean): --  RR: 19 (17 Jul 2022 11:14) (18 - 19)  SpO2: 98% (17 Jul 2022 11:14) (96% - 98%)    Parameters below as of 17 Jul 2022 11:14  Patient On (Oxygen Delivery Method): nasal cannula        PHYSICAL EXAM:  GENERAL: unkept elderly male, laying in bed, NAD   HEAD:  Atraumatic, Normocephalic  EYES: EOMI, PERRLA, conjunctiva and sclera clear  NECK: Supple, No JVD, Normal thyroid  NERVOUS SYSTEM:  Alert & Oriented X1, No gross focal deficits  CHEST/LUNG: Clear to percussion bilaterally; No rales, rhonchi, wheezing, or rubs  HEART: Regular rate and rhythm; No murmurs, rubs, or gallops  ABDOMEN: Soft, Nontender, Nondistended; Bowel sounds present  EXTREMITIES:  No clubbing, cyanosis, or edema  SKIN: multiple skin tears on the chest and arms     LABS:                        11.2   8.14  )-----------( 145      ( 16 Jul 2022 07:08 )             34.9     07-16    139  |  105  |  16.3  ----------------------------<  126<H>  4.5   |  26.0  |  0.86    Ca    8.1<L>      16 Jul 2022 07:08          CAPILLARY BLOOD GLUCOSE          RADIOLOGY & ADDITIONAL TESTS:    Imaging Personally Reviewed:  [ ] YES  [ ] NO    Consultant(s) Notes Reviewed:  [ x] YES  [ ] NO    Care Discussed with Consultants/Other Providers [ ] YES  [ ] NO    Plan of Care discussed with Housestaff [x ]YES [ ] NO

## 2022-07-17 NOTE — PHYSICAL THERAPY INITIAL EVALUATION ADULT - ADDITIONAL COMMENTS
Pt poor historian, as per chart pt lives at [facility]. Pt reports living alone in a house with 0 FREDY, main floor bedroom. Pt reports amb without device and independent with functional mobility, ADLs, and IADL. Pt reports driving and retired.

## 2022-07-18 ENCOUNTER — TRANSCRIPTION ENCOUNTER (OUTPATIENT)
Age: 87
End: 2022-07-18

## 2022-07-18 LAB
ANION GAP SERPL CALC-SCNC: 9 MMOL/L — SIGNIFICANT CHANGE UP (ref 5–17)
BASOPHILS # BLD AUTO: 0.02 K/UL — SIGNIFICANT CHANGE UP (ref 0–0.2)
BASOPHILS NFR BLD AUTO: 0.3 % — SIGNIFICANT CHANGE UP (ref 0–2)
BUN SERPL-MCNC: 17.8 MG/DL — SIGNIFICANT CHANGE UP (ref 8–20)
CALCIUM SERPL-MCNC: 8 MG/DL — LOW (ref 8.6–10.2)
CHLORIDE SERPL-SCNC: 108 MMOL/L — HIGH (ref 98–107)
CO2 SERPL-SCNC: 25 MMOL/L — SIGNIFICANT CHANGE UP (ref 22–29)
CREAT SERPL-MCNC: 0.9 MG/DL — SIGNIFICANT CHANGE UP (ref 0.5–1.3)
EGFR: 82 ML/MIN/1.73M2 — SIGNIFICANT CHANGE UP
EOSINOPHIL # BLD AUTO: 0.38 K/UL — SIGNIFICANT CHANGE UP (ref 0–0.5)
EOSINOPHIL NFR BLD AUTO: 4.9 % — SIGNIFICANT CHANGE UP (ref 0–6)
GLUCOSE SERPL-MCNC: 90 MG/DL — SIGNIFICANT CHANGE UP (ref 70–99)
HCT VFR BLD CALC: 36.8 % — LOW (ref 39–50)
HGB BLD-MCNC: 11.9 G/DL — LOW (ref 13–17)
IMM GRANULOCYTES NFR BLD AUTO: 0.4 % — SIGNIFICANT CHANGE UP (ref 0–1.5)
LYMPHOCYTES # BLD AUTO: 0.99 K/UL — LOW (ref 1–3.3)
LYMPHOCYTES # BLD AUTO: 12.9 % — LOW (ref 13–44)
MAGNESIUM SERPL-MCNC: 2 MG/DL — SIGNIFICANT CHANGE UP (ref 1.6–2.6)
MCHC RBC-ENTMCNC: 31.2 PG — SIGNIFICANT CHANGE UP (ref 27–34)
MCHC RBC-ENTMCNC: 32.3 GM/DL — SIGNIFICANT CHANGE UP (ref 32–36)
MCV RBC AUTO: 96.6 FL — SIGNIFICANT CHANGE UP (ref 80–100)
MONOCYTES # BLD AUTO: 0.81 K/UL — SIGNIFICANT CHANGE UP (ref 0–0.9)
MONOCYTES NFR BLD AUTO: 10.5 % — SIGNIFICANT CHANGE UP (ref 2–14)
NEUTROPHILS # BLD AUTO: 5.45 K/UL — SIGNIFICANT CHANGE UP (ref 1.8–7.4)
NEUTROPHILS NFR BLD AUTO: 71 % — SIGNIFICANT CHANGE UP (ref 43–77)
PLATELET # BLD AUTO: 177 K/UL — SIGNIFICANT CHANGE UP (ref 150–400)
POTASSIUM SERPL-MCNC: 3.8 MMOL/L — SIGNIFICANT CHANGE UP (ref 3.5–5.3)
POTASSIUM SERPL-SCNC: 3.8 MMOL/L — SIGNIFICANT CHANGE UP (ref 3.5–5.3)
RBC # BLD: 3.81 M/UL — LOW (ref 4.2–5.8)
RBC # FLD: 15.1 % — HIGH (ref 10.3–14.5)
SODIUM SERPL-SCNC: 142 MMOL/L — SIGNIFICANT CHANGE UP (ref 135–145)
VANCOMYCIN TROUGH SERPL-MCNC: 22.4 UG/ML — HIGH (ref 10–20)
WBC # BLD: 7.68 K/UL — SIGNIFICANT CHANGE UP (ref 3.8–10.5)
WBC # FLD AUTO: 7.68 K/UL — SIGNIFICANT CHANGE UP (ref 3.8–10.5)

## 2022-07-18 PROCEDURE — 99232 SBSQ HOSP IP/OBS MODERATE 35: CPT

## 2022-07-18 PROCEDURE — 99233 SBSQ HOSP IP/OBS HIGH 50: CPT

## 2022-07-18 RX ORDER — VANCOMYCIN HCL 1 G
1000 VIAL (EA) INTRAVENOUS EVERY 24 HOURS
Refills: 0 | Status: COMPLETED | OUTPATIENT
Start: 2022-07-18 | End: 2022-07-21

## 2022-07-18 RX ADMIN — DONEPEZIL HYDROCHLORIDE 10 MILLIGRAM(S): 10 TABLET, FILM COATED ORAL at 21:21

## 2022-07-18 RX ADMIN — MIRTAZAPINE 7.5 MILLIGRAM(S): 45 TABLET, ORALLY DISINTEGRATING ORAL at 21:21

## 2022-07-18 RX ADMIN — Medication 60 MILLIGRAM(S): at 23:49

## 2022-07-18 RX ADMIN — ATORVASTATIN CALCIUM 20 MILLIGRAM(S): 80 TABLET, FILM COATED ORAL at 21:21

## 2022-07-18 RX ADMIN — Medication 75 MILLIGRAM(S): at 13:29

## 2022-07-18 RX ADMIN — Medication 250 MILLIGRAM(S): at 22:11

## 2022-07-18 RX ADMIN — Medication 60 MILLIGRAM(S): at 17:54

## 2022-07-18 RX ADMIN — Medication 100 MILLIGRAM(S): at 21:21

## 2022-07-18 RX ADMIN — Medication 60 MILLIGRAM(S): at 00:13

## 2022-07-18 RX ADMIN — RIVAROXABAN 15 MILLIGRAM(S): KIT at 07:53

## 2022-07-18 RX ADMIN — Medication 5 MILLIGRAM(S): at 05:24

## 2022-07-18 RX ADMIN — Medication 1 APPLICATION(S): at 13:30

## 2022-07-18 RX ADMIN — Medication 50 MILLIGRAM(S): at 05:24

## 2022-07-18 RX ADMIN — RIVAROXABAN 15 MILLIGRAM(S): KIT at 17:54

## 2022-07-18 RX ADMIN — Medication 60 MILLIGRAM(S): at 12:30

## 2022-07-18 RX ADMIN — Medication 250 MILLIGRAM(S): at 00:13

## 2022-07-18 RX ADMIN — Medication 60 MILLIGRAM(S): at 05:23

## 2022-07-18 NOTE — PROGRESS NOTE ADULT - SUBJECTIVE AND OBJECTIVE BOX
Patient is a 89y old  Male who presents with a chief complaint of Redness/swelling (18 Jul 2022 07:58)      INTERVAL HPI/OVERNIGHT EVENTS: pt seen and examined. No complains. No events.       MEDICATIONS  (STANDING):  atorvastatin 20 milliGRAM(s) Oral at bedtime  BACItracin   Ointment 1 Application(s) Topical daily  diltiazem    Tablet 60 milliGRAM(s) Oral four times a day  donepezil 10 milliGRAM(s) Oral at bedtime  metoprolol succinate ER 50 milliGRAM(s) Oral daily  mirtazapine 7.5 milliGRAM(s) Oral at bedtime  predniSONE   Tablet 5 milliGRAM(s) Oral daily  rivaroxaban 15 milliGRAM(s) Oral two times a day with meals  traZODone 100 milliGRAM(s) Oral at bedtime  vancomycin  IVPB 1000 milliGRAM(s) IV Intermittent every 12 hours  venlafaxine XR. 75 milliGRAM(s) Oral daily    MEDICATIONS  (PRN):  acetaminophen     Tablet .. 650 milliGRAM(s) Oral every 6 hours PRN Temp greater or equal to 38C (100.4F), Mild Pain (1 - 3)  morphine  - Injectable 1 milliGRAM(s) IV Push every 8 hours PRN Severe Pain (7 - 10)      Allergies    penicillin (Hives)    Intolerances        REVIEW OF SYSTEMS:  CONSTITUTIONAL: No fever, weight loss, or fatigue  RESPIRATORY: No cough, wheezing, chills or hemoptysis; No shortness of breath  CARDIOVASCULAR: No chest pain, palpitations, dizziness, or leg swelling  GASTROINTESTINAL: No abdominal or epigastric pain. No nausea, vomiting, or hematemesis; No diarrhea or constipation. No melena or hematochezia.  NEUROLOGICAL: No headaches, memory loss, loss of strength, numbness, or tremors  MUSCULOSKELETAL: No joint pain or swelling; No muscle, back, or extremity pain      Vital Signs Last 24 Hrs  T(C): 36.8 (18 Jul 2022 10:11), Max: 36.9 (17 Jul 2022 22:32)  T(F): 98.2 (18 Jul 2022 10:11), Max: 98.4 (17 Jul 2022 22:32)  HR: 76 (18 Jul 2022 10:11) (68 - 76)  BP: 125/82 (18 Jul 2022 10:11) (125/82 - 159/75)  BP(mean): --  RR: 18 (18 Jul 2022 10:11) (18 - 20)  SpO2: 99% (18 Jul 2022 10:11) (96% - 99%)    Parameters below as of 18 Jul 2022 10:11  Patient On (Oxygen Delivery Method): nasal cannula        PHYSICAL EXAM:  GENERAL: NAD, laying in bed.   HEAD:  Atraumatic, Normocephalic  EYES: EOMI, PERRLA, conjunctiva and sclera clear  NECK: Supple, No JVD, Normal thyroid  NERVOUS SYSTEM:  Alert & Oriented X1 No gross focal deficits  CHEST/LUNG: Clear to percussion bilaterally; No rales, rhonchi, wheezing, or rubs  HEART: Regular rate and rhythm; No murmurs, rubs, or gallops  ABDOMEN: Soft, Nontender, Nondistended; Bowel sounds present  EXTREMITIES:  No clubbing, cyanosis, or edema  SKIN: thin skin, multiple skin tears on the torse and arms     LABS:                        11.9   7.68  )-----------( 177      ( 18 Jul 2022 06:28 )             36.8     07-18    142  |  108<H>  |  17.8  ----------------------------<  90  3.8   |  25.0  |  0.90    Ca    8.0<L>      18 Jul 2022 06:28  Mg     2.0     07-18          CAPILLARY BLOOD GLUCOSE          RADIOLOGY & ADDITIONAL TESTS:    Imaging Personally Reviewed:  [ ] YES  [ ] NO    Consultant(s) Notes Reviewed:  [ ] YES  [ ] NO    Care Discussed with Consultants/Other Providers [ ] YES  [ ] NO    Plan of Care discussed with Housestaff [ ]YES [ ] NO

## 2022-07-18 NOTE — DISCHARGE NOTE PROVIDER - ATTENDING DISCHARGE PHYSICAL EXAMINATION:
Yes
Seen and examined by myself. Very pleasant. Oriented only to self. Denies any complains. No overnight events. Cellulitis improved   ROS: negative   PE: laying in bed, NAD   Head and neck; normocephalic, no JVD   ENT: normal   Cardio: regular rate and rhythm, no murmur  Pulm: clear breath sounds   GI: abdomen is soft, BS (+_   Extr: no edema   Neuro: AOx1 to self only, no focal weakness   Skin: multiple skin tears. right arm slightly red

## 2022-07-18 NOTE — DISCHARGE NOTE PROVIDER - NSDCCPCAREPLAN_GEN_ALL_CORE_FT
PRINCIPAL DISCHARGE DIAGNOSIS  Diagnosis: Cellulitis  Assessment and Plan of Treatment: - Treated and improving with antibiotics. Blood cultures negative, remained afebrile.   - Please take all medications as prescribed.      SECONDARY DISCHARGE DIAGNOSES  Diagnosis: Acute deep vein thrombosis (DVT) of right upper extremity  Assessment and Plan of Treatment: - Noted to have a blood clot in your right upper arm. Treated with anticoagulation. Please continue with all medications as prescribed and follow up with your PCP for monitoring and medication optimization.    Diagnosis: Multiple skin tears  Assessment and Plan of Treatment: - Evaluated by wound care. Please continue with wound care as directed.    Diagnosis: Rheumatoid arthritis  Assessment and Plan of Treatment: - Continued on home prednisone.    Diagnosis: HTN (hypertension)  Assessment and Plan of Treatment: - Blood pressures monitored closely and remained stable.    Diagnosis: Dementia  Assessment and Plan of Treatment: - Continued on home medications.     PRINCIPAL DISCHARGE DIAGNOSIS  Diagnosis: Cellulitis  Assessment and Plan of Treatment: - Treated and improving with antibiotics. Blood cultures negative, remained afebrile.   Continue with Doxy for 3 more days      SECONDARY DISCHARGE DIAGNOSES  Diagnosis: Acute deep vein thrombosis (DVT) of right upper extremity  Assessment and Plan of Treatment: - Noted to have a blood clot in your right upper arm. Treated with anticoagulation. Please continue with all medications as prescribed and follow up with your PCP for monitoring and medication optimization.    Diagnosis: Multiple skin tears  Assessment and Plan of Treatment: - Evaluated by wound care. Please continue with wound care as directed.    Diagnosis: Rheumatoid arthritis  Assessment and Plan of Treatment: - Continued on home prednisone.    Diagnosis: HTN (hypertension)  Assessment and Plan of Treatment: - Blood pressures monitored closely and remained stable.    Diagnosis: Dementia  Assessment and Plan of Treatment: - Continued on home medications.     PRINCIPAL DISCHARGE DIAGNOSIS  Diagnosis: Cellulitis  Assessment and Plan of Treatment: - Treated and improving with antibiotics. Blood cultures negative, remained afebrile.   - Continue with Doxycycline for 1 more day, end date 7/21      SECONDARY DISCHARGE DIAGNOSES  Diagnosis: Acute deep vein thrombosis (DVT) of right upper extremity  Assessment and Plan of Treatment: - Noted to have a blood clot in your right upper arm. Treated with anticoagulation. Please continue with all medications as prescribed and follow up with your PCP for monitoring and medication optimization.    Diagnosis: Multiple skin tears  Assessment and Plan of Treatment: - Evaluated by wound care. Please continue with wound care as directed.    Diagnosis: Rheumatoid arthritis  Assessment and Plan of Treatment: - Continued on home prednisone.    Diagnosis: HTN (hypertension)  Assessment and Plan of Treatment: - Blood pressures monitored closely and remained stable.    Diagnosis: Dementia  Assessment and Plan of Treatment: - Continued on home medications.     PRINCIPAL DISCHARGE DIAGNOSIS  Diagnosis: Cellulitis  Assessment and Plan of Treatment: - Treated and improving with antibiotics. Blood cultures negative, remained afebrile.   Completed course of antibiotics      SECONDARY DISCHARGE DIAGNOSES  Diagnosis: Acute deep vein thrombosis (DVT) of right upper extremity  Assessment and Plan of Treatment: - Noted to have a blood clot in your right upper arm. Treated with anticoagulation. Please continue with all medications as prescribed and follow up with your PCP for monitoring and medication optimization.    Diagnosis: Multiple skin tears  Assessment and Plan of Treatment: - Evaluated by wound care. Please continue with wound care as directed.    Diagnosis: Rheumatoid arthritis  Assessment and Plan of Treatment: - Continued on home prednisone.    Diagnosis: HTN (hypertension)  Assessment and Plan of Treatment: - Blood pressures monitored closely and remained stable.    Diagnosis: Dementia  Assessment and Plan of Treatment: - Continued on home medications.  Can continue with regular food with thin liquid

## 2022-07-18 NOTE — DISCHARGE NOTE PROVIDER - NSDCFUADDINST_GEN_ALL_CORE_FT
Please seek medical attention if you develop new or worsening symptoms.  Please seek medical attention if you develop new or worsening symptoms.     Wound Care Instructions:  Skin Tears to Chest Wall - Cleanse with normal saline, Pat dry, paint periwound with cavilon, cover wound with Aquacel, abd pad, secure with paper tape (minimal tape to skin).  Skin Tears to Right Forearm -  Cleanse with normal saline, Pat dry, paint periwound with cavilon, cover wound with Aquacel, abd pad, wrap with Kerlix.      -Continue turning/positioning patient from side-to-side q2h while in bed, q1h when/if OOB chair, or in accordance w/ pt's plan of care. Utilize pillows and/or Spry positioner pillow to assist w/ turning/positioning. When/if OOB chair, utilize pillows or chair cushion to offload pressure.   -Continue to offload heels from bed surface with soft pillow under calfs or by applying offloading boots to BLEs.   -Continue applying Coloplast Joce Protect moisture barrier cream to buttock and perineal area daily and prn after each incontinent episode.    -Continue utilizing one underpad underneath patient to contain incontinence episodes; change pad when saturated/soiled.   -Consider utilizing condom catheter (if patient candidate) to contain any urinary incontinence.   -Consider utilizing external fecal  (if patient candidate) or fecal management system/rectal tube/DigniShield (if patient candidate; MD order required) to contain loose fecal incontinence.   -Continue nutrition consult for optimal wound healing & nutritional status.   -Assess skin/wound qshift, report changes to primary provider.

## 2022-07-18 NOTE — DISCHARGE NOTE PROVIDER - HOSPITAL COURSE
89 year old male with PMHx of Dementia, HTN, HLD, prostate CA, RA, hard of hearing, with multiple recent hospitalizations, who presented to [facility] with c/o right arm swelling and cellulitis associated with serosanguinous drainage. In the ED, US RUE revealed acute DVT of the brachial vein and patient was restarted on Xarelto. Patient was started on IV antibiotics for RUE cellulitis, and admitted for management of acute DVT with cellulitis. Blood cultures returned no growth to date, and patient remained afebrile without leukocytosis. Imaging from recent hospitalization reviewed to evaluate for source of DVT, though no clear source identified. During admission, patient evaluated by wound care for pre-existing skin tears. Patient evaluated by PT, who recommend discharge to [facility].     pending improvement in cellulitis    89 year old male with PMHx of Dementia, HTN, HLD, prostate CA, RA, hard of hearing, with multiple recent hospitalizations, who presented to [facility] ED with c/o right arm swelling and cellulitis associated with serosanguinous drainage. In the ED, US RUE revealed acute DVT of the brachial vein and patient was restarted on Xarelto. Patient was started on IV antibiotics for RUE cellulitis, and admitted for management of acute DVT with cellulitis. Blood cultures returned no growth to date, and patient remained afebrile without leukocytosis. Imaging from recent hospitalization reviewed to evaluate for source of DVT, though no clear source identified. During admission, patient evaluated by wound care for pre-existing skin tears. Patient evaluated by PT, who recommend discharge to [facility].   Stable for discharge and to continue with oral antibiotics for 3 more days    89 year old male with PMHx of Dementia, HTN, HLD, prostate CA, RA, hard of hearing, with multiple recent hospitalizations, who presented to [facility] ED with c/o right arm swelling and cellulitis associated with serosanguinous drainage. In the ED, US RUE revealed acute DVT of the brachial vein and patient was restarted on Xarelto. Patient was started on IV antibiotics for RUE cellulitis, and admitted for management of acute DVT with cellulitis. Blood cultures returned no growth to date, and patient remained afebrile without leukocytosis. Imaging from recent hospitalization reviewed to evaluate for source of DVT, though no clear source identified. During admission, patient evaluated by wound care for pre-existing skin tears. Patient evaluated by PT, who recommend discharge to [facility].

## 2022-07-18 NOTE — PROGRESS NOTE ADULT - SUBJECTIVE AND OBJECTIVE BOX
[facility]  INFECTIOUS DISEASES at [place] and [place]  =======================================================                               Deandre Chow MD#  Ezekiel Braun MD*                                     Nico Coffey MD*    Kaylie Hooper MD*            Diplomates American Board of Internal Medicine & Infectious Diseases                # [place] Office - Appt - Tel  [phone] Fax [phone]                * [place] Office - Appt - Tel [phone] Fax [phone]                                  Hospital Consult line:  [phone]  =======================================================    DOMONIQUE MAST [id number]    Follow up: Cellulitis    No fevers       Allergies:  penicillin (Hives)      REVIEW OF SYSTEMS:  Limited. Patient is oriented to self.       Physical Exam:  GEN: NAD  HEENT: normocephalic and atraumatic. EOMI. PERRL.    NECK: Supple.   LUNGS: CTA B/L.  HEART: RRR  ABDOMEN: Soft, NT, ND.  +BS.    : No CVA tenderness  EXTREMITIES: + edema.  MSK: No joint swelling  NEUROLOGIC: No Focal Deficits   PSYCHIATRIC: Confused   SKIN: LUE with cellulitis, open wound, skin tear on chest, LUE with swelling       Vitals:  T(F): 98.2 (18 Jul 2022 10:11), Max: 98.4 (17 Jul 2022 22:32)  HR: 76 (18 Jul 2022 10:11)  BP: 125/82 (18 Jul 2022 10:11)  RR: 18 (18 Jul 2022 10:11)  SpO2: 99% (18 Jul 2022 10:11) (96% - 99%)  temp max in last 48H T(F): , Max: 98.4 (07-17-22 @ 22:32)      Current Antibiotics:  vancomycin  IVPB 1000 milliGRAM(s) IV Intermittent every 12 hours    Other medications:  atorvastatin 20 milliGRAM(s) Oral at bedtime  BACItracin   Ointment 1 Application(s) Topical daily  diltiazem    Tablet 60 milliGRAM(s) Oral four times a day  donepezil 10 milliGRAM(s) Oral at bedtime  metoprolol succinate ER 50 milliGRAM(s) Oral daily  mirtazapine 7.5 milliGRAM(s) Oral at bedtime  predniSONE   Tablet 5 milliGRAM(s) Oral daily  rivaroxaban 15 milliGRAM(s) Oral two times a day with meals  traZODone 100 milliGRAM(s) Oral at bedtime  venlafaxine XR. 75 milliGRAM(s) Oral daily                            11.9   7.68  )-----------( 177      ( 18 Jul 2022 06:28 )             36.8     07-18    142  |  108<H>  |  17.8  ----------------------------<  90  3.8   |  25.0  |  0.90    Ca    8.0<L>      18 Jul 2022 06:28  Mg     2.0     07-18      RECENT CULTURES:  07-15 @ 07:02    [facility]    07-14 @ 22:04 .Blood Blood-Peripheral     No growth to date.    07-14 @ 22:03 .Blood Blood-Peripheral     No growth to date.    07-05 @ 23:07 Clean Catch Clean Catch (Midstream)     No growth    07-05 @ 01:04 Clean Catch Clean Catch (Midstream)     No growth        WBC Count: 7.68 K/uL (07-18-22 @ 06:28)  WBC Count: 8.14 K/uL (07-16-22 @ 07:08)  WBC Count: 7.94 K/uL (07-15-22 @ 09:07)  WBC Count: 11.65 K/uL (07-14-22 @ 17:53)    Creatinine, Serum: 0.90 mg/dL (07-18-22 @ 06:28)  Creatinine, Serum: 0.86 mg/dL (07-16-22 @ 07:08)  Creatinine, Serum: 0.76 mg/dL (07-15-22 @ 09:07)  Creatinine, Serum: 1.11 mg/dL (07-14-22 @ 17:53)     SARS-CoV-2: NotDetec (07-15-22 @ 07:02)  SARS-CoV-2: NotDetec (07-05-22 @ 11:58)  COVID-19 PCR: NotDetec (06-22-22 @ 08:15)  COVID-19 PCR: Detected (06-20-22 @ 13:00)

## 2022-07-18 NOTE — DISCHARGE NOTE PROVIDER - DETAILS OF MALNUTRITION DIAGNOSIS/DIAGNOSES
This patient has been assessed with a concern for Malnutrition and was treated during this hospitalization for the following Nutrition diagnosis/diagnoses:     -  07/21/2022: Moderate protein-calorie malnutrition

## 2022-07-18 NOTE — DISCHARGE NOTE PROVIDER - NSDCMRMEDTOKEN_GEN_ALL_CORE_FT
albuterol 90 mcg/inh inhalation aerosol: 2 puff(s) inhaled 4 times a day, As needed, Shortness of Breath and/or Wheezing  atorvastatin 20 mg oral tablet: 1 tab(s) orally once a day  Cardizem 60 mg oral tablet: 1 tab(s) orally 4 times a day  donepezil 10 mg oral tablet: 1 tab(s) orally once a day (at bedtime)  Gemtesa 75 mg oral tablet: 1 tab(s) orally once a day  Metoprolol Succinate ER 50 mg oral tablet, extended release: 1 tab(s) orally once a day  mirtazapine 7.5 mg oral tablet: 1 tab(s) orally once a day (at bedtime)  predniSONE 5 mg oral tablet: 1 tab(s) orally once a day  traZODone 100 mg oral tablet: 1 tab(s) orally once a day (at bedtime)  venlafaxine 75 mg oral capsule, extended release: 1 cap(s) orally once a day   albuterol 90 mcg/inh inhalation aerosol: 2 puff(s) inhaled 4 times a day, As needed, Shortness of Breath and/or Wheezing  atorvastatin 20 mg oral tablet: 1 tab(s) orally once a day  bacitracin 500 units/g topical ointment: 1 application topically once a day  Cardizem 60 mg oral tablet: 1 tab(s) orally 4 times a day  donepezil 10 mg oral tablet: 1 tab(s) orally once a day (at bedtime)  doxycycline hyclate 100 mg oral capsule: 1 cap(s) orally 2 times a day for 3 more days   Metoprolol Succinate ER 50 mg oral tablet, extended release: 1 tab(s) orally once a day  mirtazapine 7.5 mg oral tablet: 1 tab(s) orally once a day (at bedtime)  predniSONE 5 mg oral tablet: 1 tab(s) orally once a day  traZODone 100 mg oral tablet: 1 tab(s) orally once a day (at bedtime)  venlafaxine 75 mg oral capsule, extended release: 1 cap(s) orally once a day   albuterol 90 mcg/inh inhalation aerosol: 2 puff(s) inhaled 4 times a day, As needed, Shortness of Breath and/or Wheezing  atorvastatin 20 mg oral tablet: 1 tab(s) orally once a day  bacitracin 500 units/g topical ointment: 1 application topically once a day  Cardizem 60 mg oral tablet: 1 tab(s) orally 4 times a day  donepezil 10 mg oral tablet: 1 tab(s) orally once a day (at bedtime)  doxycycline hyclate 100 mg oral capsule: 1 cap(s) orally 2 times a day for 3 more days   Metoprolol Succinate ER 50 mg oral tablet, extended release: 1 tab(s) orally once a day  mirtazapine 7.5 mg oral tablet: 1 tab(s) orally once a day (at bedtime)  predniSONE 5 mg oral tablet: 1 tab(s) orally once a day  rivaroxaban 15 mg oral tablet: 1 tab(s) orally 2 times a day (with meals)  traZODone 100 mg oral tablet: 1 tab(s) orally once a day (at bedtime)  venlafaxine 75 mg oral capsule, extended release: 1 cap(s) orally once a day   albuterol 90 mcg/inh inhalation aerosol: 2 puff(s) inhaled 4 times a day, As needed, Shortness of Breath and/or Wheezing  atorvastatin 20 mg oral tablet: 1 tab(s) orally once a day  bacitracin 500 units/g topical ointment: 1 application topically once a day  Cardizem 60 mg oral tablet: 1 tab(s) orally 4 times a day  donepezil 10 mg oral tablet: 1 tab(s) orally once a day (at bedtime)  Metoprolol Succinate ER 50 mg oral tablet, extended release: 1 tab(s) orally once a day  mirtazapine 7.5 mg oral tablet: 1 tab(s) orally once a day (at bedtime)  predniSONE 5 mg oral tablet: 1 tab(s) orally once a day  rivaroxaban 15 mg oral tablet: 1 tab(s) orally 2 times a day (with meals)  traZODone 100 mg oral tablet: 1 tab(s) orally once a day (at bedtime)  venlafaxine 75 mg oral capsule, extended release: 1 cap(s) orally once a day

## 2022-07-18 NOTE — DISCHARGE NOTE PROVIDER - CARE PROVIDER_API CALL
Bello Roe)  Medicine  [address] [place] [address]  Phone: [phone]  Fax: [phone]  Follow Up Time: 1-3 days

## 2022-07-19 ENCOUNTER — TRANSCRIPTION ENCOUNTER (OUTPATIENT)
Age: 87
End: 2022-07-19

## 2022-07-19 LAB
CULTURE RESULTS: SIGNIFICANT CHANGE UP
CULTURE RESULTS: SIGNIFICANT CHANGE UP
SARS-COV-2 RNA SPEC QL NAA+PROBE: SIGNIFICANT CHANGE UP
SPECIMEN SOURCE: SIGNIFICANT CHANGE UP
SPECIMEN SOURCE: SIGNIFICANT CHANGE UP

## 2022-07-19 PROCEDURE — 99232 SBSQ HOSP IP/OBS MODERATE 35: CPT

## 2022-07-19 RX ORDER — VIBEGRON 75 MG/1
1 TABLET, FILM COATED ORAL
Qty: 0 | Refills: 0 | DISCHARGE

## 2022-07-19 RX ORDER — RIVAROXABAN 15 MG-20MG
1 KIT ORAL
Qty: 42 | Refills: 0
Start: 2022-07-19 | End: 2022-08-08

## 2022-07-19 RX ORDER — BACITRACIN ZINC 500 UNIT/G
1 OINTMENT IN PACKET (EA) TOPICAL
Qty: 0 | Refills: 0 | DISCHARGE
Start: 2022-07-19

## 2022-07-19 RX ADMIN — Medication 50 MILLIGRAM(S): at 05:54

## 2022-07-19 RX ADMIN — DONEPEZIL HYDROCHLORIDE 10 MILLIGRAM(S): 10 TABLET, FILM COATED ORAL at 21:40

## 2022-07-19 RX ADMIN — RIVAROXABAN 15 MILLIGRAM(S): KIT at 17:23

## 2022-07-19 RX ADMIN — Medication 5 MILLIGRAM(S): at 05:54

## 2022-07-19 RX ADMIN — ATORVASTATIN CALCIUM 20 MILLIGRAM(S): 80 TABLET, FILM COATED ORAL at 21:39

## 2022-07-19 RX ADMIN — Medication 250 MILLIGRAM(S): at 21:42

## 2022-07-19 RX ADMIN — RIVAROXABAN 15 MILLIGRAM(S): KIT at 07:57

## 2022-07-19 RX ADMIN — Medication 60 MILLIGRAM(S): at 21:39

## 2022-07-19 RX ADMIN — Medication 60 MILLIGRAM(S): at 05:53

## 2022-07-19 RX ADMIN — Medication 75 MILLIGRAM(S): at 11:52

## 2022-07-19 RX ADMIN — Medication 60 MILLIGRAM(S): at 11:52

## 2022-07-19 RX ADMIN — Medication 100 MILLIGRAM(S): at 21:40

## 2022-07-19 RX ADMIN — Medication 1 APPLICATION(S): at 11:52

## 2022-07-19 RX ADMIN — Medication 60 MILLIGRAM(S): at 17:24

## 2022-07-19 RX ADMIN — MIRTAZAPINE 7.5 MILLIGRAM(S): 45 TABLET, ORALLY DISINTEGRATING ORAL at 21:40

## 2022-07-19 NOTE — DISCHARGE NOTE NURSING/CASE MANAGEMENT/SOCIAL WORK - NSDCFUADDAPPT_GEN_ALL_CORE_FT
[facility] patient last visit June 2022.  This  admission is for Cellulitis- [facility] referral sent as needed. Pt's meds and f/u appointments managed by LITO and family. No concerns reported.

## 2022-07-19 NOTE — DISCHARGE NOTE NURSING/CASE MANAGEMENT/SOCIAL WORK - NSDCPEFALRISK_GEN_ALL_CORE
For information on Fall & Injury Prevention, visit: [email or web address] OR  [email or web address] OR  https://www.cdc.gov/steadi/patient.html

## 2022-07-19 NOTE — DISCHARGE NOTE NURSING/CASE MANAGEMENT/SOCIAL WORK - NSPROEXTENSIONSOFSELF_GEN_A_NUR
CARDIOVASCULAR - ADULT    • Maintains optimal cardiac output and hemodynamic stability Not Progressing    • Absence of cardiac arrhythmias or at baseline Not Progressing        METABOLIC/FLUID AND ELECTROLYTES - ADULT    • Glucose maintained within prescri none

## 2022-07-19 NOTE — DISCHARGE NOTE NURSING/CASE MANAGEMENT/SOCIAL WORK - PATIENT PORTAL LINK FT
You can access the FollowMyHealth Patient Portal offered by [facility] by registering at the following website: [email or web address]. By joining [facility]’s FollowMyHealth portal, you will also be able to view your health information using other applications (apps) compatible with our system.

## 2022-07-19 NOTE — ADVANCED PRACTICE NURSE CONSULT - ASSESSMENT
Received patient laying supine in bed, HOB elevated 30 degrees. Pt awake, A&Ox2-3, made aware of purpose of WOCN visit, agreeable to consult.  Patient with limited mobility in bed. 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  Ordered for regular diet, current Phill score of 12. With assistance, patient turned patient to side for skin assessment. History of skin tears PTA due to lead placement. Skin assessment as below:    ·	Right Medial Chest Wall (2.5x6cm) - fully approximated skin tear (type 3) - pink red friable wound bed with peeling irregular edges, periwound fragile, red, blanching, intact, warm    ·	Right Lateral Chest Wall (4x2.5cm) - fully approximated skin tear (type 3) - pink red friable wound bed with peeling irregular edges, periwound fragile, red, blanching, intact, warm      ·	Left Chest Wall (0.5x2.5cm) -  fully approximated skin tear (type 3) - pink red friable wound bed with peeling irregular edges, periwound fragile, red, blanching, intact, warm      ·	Right Forearm proximal aspect (6x3cm) - fully approximated skin tear (type 3) - pink red friable wound bed with peeling irregular edges, periwound fragile, red, blanching, intact, warm      ·	Right Forearm distal aspect (6x3cm) - fully approximated skin tear (type 3) - pink red friable wound bed with peeling irregular edges, periwound fragile, red, blanching, intact, warm

## 2022-07-19 NOTE — ADVANCED PRACTICE NURSE CONSULT - RECOMMEDATIONS
·	Skin Tears to Chest Wall - Cleanse with normal saline, Pat dry, paint periwound with cavilon, cover wound with Aquacel, abd pad, secure with paper tape (minimal tape to skin).  ·	Skin Tears to Right Forearm -  Cleanse with normal saline, Pat dry, paint periwound with cavilon, cover wound with Aquacel, abd pad, wrap with Kerlix.      -Continue turning/positioning patient from side-to-side q2h while in bed, q1h when/if OOB chair, or in accordance w/ pt's plan of care. Utilize pillows and/or Spry positioner pillow to assist w/ turning/positioning. When/if OOB chair, utilize pillows or chair cushion to offload pressure.   -Continue to offload heels from bed surface with soft pillow under calfs or by applying offloading boots to BLEs.   -Continue applying Coloplast Joce Protect moisture barrier cream to buttock and perineal area daily and prn after each incontinent episode.    -Continue utilizing one underpad underneath patient to contain incontinence episodes; change pad when saturated/soiled.   -Consider utilizing condom catheter (if patient candidate) to contain any urinary incontinence.   -Consider utilizing external fecal  (if patient candidate) or fecal management system/rectal tube/DigniShield (if patient candidate; MD order required) to contain loose fecal incontinence.   -Continue nutrition consult for optimal wound healing & nutritional status.   -Assess skin/wound qshift, report changes to primary provider.     Plan of Care: Primary RN made aware of above recs. Spoke w/ covering GENNARO Puente in regards to above. No further needs/recs from [facility] service at this time. Staff RN to perform routine skin/wound assessment and manage wound care. Questions or concerns or if wound worsens and reconsult needed, please contact [facility]

## 2022-07-20 LAB — VANCOMYCIN TROUGH SERPL-MCNC: 10.6 UG/ML — SIGNIFICANT CHANGE UP (ref 10–20)

## 2022-07-20 PROCEDURE — 99232 SBSQ HOSP IP/OBS MODERATE 35: CPT

## 2022-07-20 RX ADMIN — Medication 75 MILLIGRAM(S): at 12:19

## 2022-07-20 RX ADMIN — Medication 60 MILLIGRAM(S): at 23:26

## 2022-07-20 RX ADMIN — Medication 60 MILLIGRAM(S): at 18:26

## 2022-07-20 RX ADMIN — RIVAROXABAN 15 MILLIGRAM(S): KIT at 18:27

## 2022-07-20 RX ADMIN — Medication 60 MILLIGRAM(S): at 05:47

## 2022-07-20 RX ADMIN — Medication 60 MILLIGRAM(S): at 12:19

## 2022-07-20 RX ADMIN — Medication 50 MILLIGRAM(S): at 05:47

## 2022-07-20 RX ADMIN — DONEPEZIL HYDROCHLORIDE 10 MILLIGRAM(S): 10 TABLET, FILM COATED ORAL at 21:11

## 2022-07-20 RX ADMIN — ATORVASTATIN CALCIUM 20 MILLIGRAM(S): 80 TABLET, FILM COATED ORAL at 21:11

## 2022-07-20 RX ADMIN — Medication 5 MILLIGRAM(S): at 05:47

## 2022-07-20 RX ADMIN — Medication 250 MILLIGRAM(S): at 23:26

## 2022-07-20 RX ADMIN — RIVAROXABAN 15 MILLIGRAM(S): KIT at 08:49

## 2022-07-20 RX ADMIN — Medication 1 APPLICATION(S): at 13:02

## 2022-07-20 RX ADMIN — Medication 100 MILLIGRAM(S): at 21:11

## 2022-07-20 RX ADMIN — MIRTAZAPINE 7.5 MILLIGRAM(S): 45 TABLET, ORALLY DISINTEGRATING ORAL at 21:11

## 2022-07-20 NOTE — PHARMACOTHERAPY INTERVENTION NOTE - NSPHARMCOMMASP
ASP - Lab/ test recommended
ASP - Dose optimization/Non-Renal dose adjustment

## 2022-07-21 LAB
ANION GAP SERPL CALC-SCNC: 8 MMOL/L — SIGNIFICANT CHANGE UP (ref 5–17)
BASOPHILS # BLD AUTO: 0.03 K/UL — SIGNIFICANT CHANGE UP (ref 0–0.2)
BASOPHILS NFR BLD AUTO: 0.4 % — SIGNIFICANT CHANGE UP (ref 0–2)
BUN SERPL-MCNC: 22.8 MG/DL — HIGH (ref 8–20)
CALCIUM SERPL-MCNC: 7.8 MG/DL — LOW (ref 8.6–10.2)
CHLORIDE SERPL-SCNC: 109 MMOL/L — HIGH (ref 98–107)
CO2 SERPL-SCNC: 25 MMOL/L — SIGNIFICANT CHANGE UP (ref 22–29)
CREAT SERPL-MCNC: 0.97 MG/DL — SIGNIFICANT CHANGE UP (ref 0.5–1.3)
EGFR: 75 ML/MIN/1.73M2 — SIGNIFICANT CHANGE UP
EOSINOPHIL # BLD AUTO: 0.33 K/UL — SIGNIFICANT CHANGE UP (ref 0–0.5)
EOSINOPHIL NFR BLD AUTO: 4.1 % — SIGNIFICANT CHANGE UP (ref 0–6)
GLUCOSE SERPL-MCNC: 91 MG/DL — SIGNIFICANT CHANGE UP (ref 70–99)
HCT VFR BLD CALC: 33.9 % — LOW (ref 39–50)
HGB BLD-MCNC: 11.2 G/DL — LOW (ref 13–17)
IMM GRANULOCYTES NFR BLD AUTO: 0.6 % — SIGNIFICANT CHANGE UP (ref 0–1.5)
LYMPHOCYTES # BLD AUTO: 1.18 K/UL — SIGNIFICANT CHANGE UP (ref 1–3.3)
LYMPHOCYTES # BLD AUTO: 14.5 % — SIGNIFICANT CHANGE UP (ref 13–44)
MAGNESIUM SERPL-MCNC: 2.1 MG/DL — SIGNIFICANT CHANGE UP (ref 1.8–2.6)
MCHC RBC-ENTMCNC: 31.5 PG — SIGNIFICANT CHANGE UP (ref 27–34)
MCHC RBC-ENTMCNC: 33 GM/DL — SIGNIFICANT CHANGE UP (ref 32–36)
MCV RBC AUTO: 95.5 FL — SIGNIFICANT CHANGE UP (ref 80–100)
MONOCYTES # BLD AUTO: 0.66 K/UL — SIGNIFICANT CHANGE UP (ref 0–0.9)
MONOCYTES NFR BLD AUTO: 8.1 % — SIGNIFICANT CHANGE UP (ref 2–14)
NEUTROPHILS # BLD AUTO: 5.89 K/UL — SIGNIFICANT CHANGE UP (ref 1.8–7.4)
NEUTROPHILS NFR BLD AUTO: 72.3 % — SIGNIFICANT CHANGE UP (ref 43–77)
PLATELET # BLD AUTO: 203 K/UL — SIGNIFICANT CHANGE UP (ref 150–400)
POTASSIUM SERPL-MCNC: 3.9 MMOL/L — SIGNIFICANT CHANGE UP (ref 3.5–5.3)
POTASSIUM SERPL-SCNC: 3.9 MMOL/L — SIGNIFICANT CHANGE UP (ref 3.5–5.3)
RBC # BLD: 3.55 M/UL — LOW (ref 4.2–5.8)
RBC # FLD: 14.9 % — HIGH (ref 10.3–14.5)
SODIUM SERPL-SCNC: 142 MMOL/L — SIGNIFICANT CHANGE UP (ref 135–145)
WBC # BLD: 8.14 K/UL — SIGNIFICANT CHANGE UP (ref 3.8–10.5)
WBC # FLD AUTO: 8.14 K/UL — SIGNIFICANT CHANGE UP (ref 3.8–10.5)

## 2022-07-21 PROCEDURE — 99232 SBSQ HOSP IP/OBS MODERATE 35: CPT

## 2022-07-21 RX ADMIN — DONEPEZIL HYDROCHLORIDE 10 MILLIGRAM(S): 10 TABLET, FILM COATED ORAL at 21:48

## 2022-07-21 RX ADMIN — Medication 60 MILLIGRAM(S): at 11:53

## 2022-07-21 RX ADMIN — Medication 250 MILLIGRAM(S): at 21:49

## 2022-07-21 RX ADMIN — Medication 5 MILLIGRAM(S): at 05:05

## 2022-07-21 RX ADMIN — MIRTAZAPINE 7.5 MILLIGRAM(S): 45 TABLET, ORALLY DISINTEGRATING ORAL at 21:48

## 2022-07-21 RX ADMIN — Medication 1 APPLICATION(S): at 11:53

## 2022-07-21 RX ADMIN — RIVAROXABAN 15 MILLIGRAM(S): KIT at 08:33

## 2022-07-21 RX ADMIN — Medication 75 MILLIGRAM(S): at 11:53

## 2022-07-21 RX ADMIN — Medication 50 MILLIGRAM(S): at 05:05

## 2022-07-21 RX ADMIN — Medication 60 MILLIGRAM(S): at 16:39

## 2022-07-21 RX ADMIN — RIVAROXABAN 15 MILLIGRAM(S): KIT at 16:39

## 2022-07-21 RX ADMIN — ATORVASTATIN CALCIUM 20 MILLIGRAM(S): 80 TABLET, FILM COATED ORAL at 21:48

## 2022-07-21 RX ADMIN — Medication 100 MILLIGRAM(S): at 21:48

## 2022-07-21 RX ADMIN — Medication 60 MILLIGRAM(S): at 05:05

## 2022-07-21 NOTE — DIETITIAN INITIAL EVALUATION ADULT - PERTINENT LABORATORY DATA
07-21    142  |  109<H>  |  22.8<H>  ----------------------------<  91  3.9   |  25.0  |  0.97    Ca    7.8<L>      21 Jul 2022 05:19  Mg     2.1     07-21

## 2022-07-21 NOTE — DIETITIAN INITIAL EVALUATION ADULT - NSICDXPASTMEDICALHX_GEN_ALL_CORE_FT
PAST MEDICAL HISTORY:  Dementia     H/O: depression     HLD (hyperlipidemia)     [place] (hard of hearing)     HTN (hypertension)     Prostate cancer

## 2022-07-21 NOTE — PROGRESS NOTE ADULT - SUBJECTIVE AND OBJECTIVE BOX
Patient is a 89y old  Male who presents with a chief complaint of Redness/swelling (18 Jul 2022 10:42)      INTERVAL HPI/OVERNIGHT EVENTS:  Seen and examined. Comfortably sitting on the bed and eating. Pleasantly confused, but follows commands     MEDICATIONS  (STANDING):  atorvastatin 20 milliGRAM(s) Oral at bedtime  BACItracin   Ointment 1 Application(s) Topical daily  diltiazem    Tablet 60 milliGRAM(s) Oral four times a day  donepezil 10 milliGRAM(s) Oral at bedtime  metoprolol succinate ER 50 milliGRAM(s) Oral daily  mirtazapine 7.5 milliGRAM(s) Oral at bedtime  predniSONE   Tablet 5 milliGRAM(s) Oral daily  rivaroxaban 15 milliGRAM(s) Oral two times a day with meals  traZODone 100 milliGRAM(s) Oral at bedtime  vancomycin  IVPB 1000 milliGRAM(s) IV Intermittent every 24 hours  venlafaxine XR. 75 milliGRAM(s) Oral daily    MEDICATIONS  (PRN):  acetaminophen     Tablet .. 650 milliGRAM(s) Oral every 6 hours PRN Temp greater or equal to 38C (100.4F), Mild Pain (1 - 3)  morphine  - Injectable 1 milliGRAM(s) IV Push every 8 hours PRN Severe Pain (7 - 10)      Allergies    penicillin (Hives)    Intolerances        REVIEW OF SYSTEMS:  CONSTITUTIONAL: No fever, weight loss, or fatigue  RESPIRATORY: No cough, wheezing, chills or hemoptysis; No shortness of breath  CARDIOVASCULAR: No chest pain, palpitations, dizziness, or leg swelling  GASTROINTESTINAL: No abdominal or epigastric pain. No nausea, vomiting, or hematemesis; No diarrhea or constipation. No melena or hematochezia.  NEUROLOGICAL: No headaches, memory loss, loss of strength, numbness, or tremors  MUSCULOSKELETAL: No joint pain or swelling; No muscle, back, or extremity pain      Vital Signs Last 24 Hrs  T(C): 36.4 (21 Jul 2022 11:07), Max: 36.7 (20 Jul 2022 18:32)  T(F): 97.5 (21 Jul 2022 11:07), Max: 98 (20 Jul 2022 18:32)  HR: 72 (21 Jul 2022 11:07) (51 - 78)  BP: 159/82 (21 Jul 2022 11:07) (137/81 - 159/84)  BP(mean): --  RR: 18 (21 Jul 2022 11:07) (18 - 18)  SpO2: 93% (21 Jul 2022 11:07) (92% - 96%)    Parameters below as of 21 Jul 2022 11:07  Patient On (Oxygen Delivery Method): room air        PHYSICAL EXAM:  GENERAL: NAD, pleasantly confused   HEAD:  Atraumatic, Normocephalic  EYES: EOMI, PERRLA, conjunctiva and sclera clear  NECK: Supple, No JVD, Normal thyroid  NERVOUS SYSTEM:  Alert & Oriented X1, No gross focal deficits  CHEST/LUNG: Clear to percussion bilaterally; No rales, rhonchi, wheezing, or rubs  HEART: Regular rate and rhythm; No murmurs, rubs, or gallops  ABDOMEN: Soft, Nontender, Nondistended; Bowel sounds present  EXTREMITIES:  No clubbing, cyanosis, or edema  SKIN: dressing in the chest wall     LABS:                        11.2   8.14  )-----------( 203      ( 21 Jul 2022 05:19 )             33.9     07-21    142  |  109<H>  |  22.8<H>  ----------------------------<  91  3.9   |  25.0  |  0.97    Ca    7.8<L>      21 Jul 2022 05:19  Mg     2.1     07-21          CAPILLARY BLOOD GLUCOSE          RADIOLOGY & ADDITIONAL TESTS:    Imaging Personally Reviewed:  [ ] YES  [ ] NO    Consultant(s) Notes Reviewed:  [ ] YES  [ ] NO    Care Discussed with Consultants/Other Providers [ ] YES  [ ] NO    Plan of Care discussed with Housestaff [ ]YES [ ] NO

## 2022-07-21 NOTE — DIETITIAN NUTRITION RISK NOTIFICATION - ADDITIONAL COMMENTS/DIETITIAN RECOMMENDATIONS
1) Add Ensure Enlive BID  2) Rx MVI and vit C 500mg daily   3) Provide encouragement/assistance as needed during mealtimes to inc PO   4) Monitor weights daily for trend/accuracy   5) Encourage HBV protein sources

## 2022-07-21 NOTE — DIETITIAN INITIAL EVALUATION ADULT - ETIOLOGY
related to inability to meet sufficient protein-energy needs in setting of dementia, cellulitis, multiple hospitalizations

## 2022-07-21 NOTE — DIETITIAN INITIAL EVALUATION ADULT - PERTINENT MEDS FT
MEDICATIONS  (STANDING):  atorvastatin 20 milliGRAM(s) Oral at bedtime  BACItracin   Ointment 1 Application(s) Topical daily  diltiazem    Tablet 60 milliGRAM(s) Oral four times a day  donepezil 10 milliGRAM(s) Oral at bedtime  metoprolol succinate ER 50 milliGRAM(s) Oral daily  mirtazapine 7.5 milliGRAM(s) Oral at bedtime  predniSONE   Tablet 5 milliGRAM(s) Oral daily  rivaroxaban 15 milliGRAM(s) Oral two times a day with meals  traZODone 100 milliGRAM(s) Oral at bedtime  vancomycin  IVPB 1000 milliGRAM(s) IV Intermittent every 24 hours  venlafaxine XR. 75 milliGRAM(s) Oral daily    MEDICATIONS  (PRN):  acetaminophen     Tablet .. 650 milliGRAM(s) Oral every 6 hours PRN Temp greater or equal to 38C (100.4F), Mild Pain (1 - 3)  morphine  - Injectable 1 milliGRAM(s) IV Push every 8 hours PRN Severe Pain (7 - 10)

## 2022-07-21 NOTE — DIETITIAN INITIAL EVALUATION ADULT - OTHER INFO
89 yr old M w/a PMH of Dementia, HTN, HLD, prostate CA, RA and hard of hearing presents from [facility] for right arm swelling and cellulitis.  Overall patient has been declining he was admitted with COVID on 5/27 and then with pneumonia on 6/16 as well as fall on 7/4. Nurse states that yesterday they noticed redness and swelling of his right arm with draining serosanguinous in nature. Patient was started on doxycycline without any improvement. Patient at baseline is oriented to self.

## 2022-07-21 NOTE — DIETITIAN INITIAL EVALUATION ADULT - ADD RECOMMEND
Rx MVI and vit C 500mg daily; Add Ensure Enlive BID; Encourage HBV protein sources; Provide encouragement/assistance as needed during mealtimes to inc PO

## 2022-07-21 NOTE — SWALLOW BEDSIDE ASSESSMENT ADULT - SWALLOW EVAL: DIAGNOSIS
Oral & pharyngeal stage of swallow clinically unremarkable for puree, regular solids & thin fluids. No overt s/s aspiration observed post intake

## 2022-07-21 NOTE — DIETITIAN INITIAL EVALUATION ADULT - ORAL INTAKE PTA/DIET HISTORY
Pt with poor PO intake noted completing 50-75% of meals. Pt confused, unable to participate in interview at this time. Per previous admissions June 2022 Pt had continued poor PO intake with weight loss noted. Admission weight 200lbs, current RD bedscale weight 179lbs, will continue to monitor for accuracy.  Pt with poor PO intake noted completing 50-75% of meals. Pt confused, unable to participate in interview at this time. Per previous admissions June 2022 Pt had continued poor PO intake with weight loss noted. Admission weight 200lbs, current RD bedscale weight 179lbs, will continue to monitor for accuracy. Pt s/p SLP eval recommending regular/thin tolerated well.

## 2022-07-21 NOTE — SWALLOW BEDSIDE ASSESSMENT ADULT - COMMENTS
As per MD note: "89 yr old M w/a PMH of Dementia, HTN, HLD, prostate CA, RA and hard of hearing  presents from [facility] for right arm swelling and cellulitis"

## 2022-07-21 NOTE — SWALLOW BEDSIDE ASSESSMENT ADULT - SLP GENERAL OBSERVATIONS
Pt received & seen seated upright OOB in chair, awake/alert, decreased hearing acuity, 0/10 pain pre/post

## 2022-07-22 VITALS
RESPIRATION RATE: 18 BRPM | DIASTOLIC BLOOD PRESSURE: 71 MMHG | SYSTOLIC BLOOD PRESSURE: 151 MMHG | HEART RATE: 89 BPM | OXYGEN SATURATION: 98 % | TEMPERATURE: 98 F

## 2022-07-22 LAB — SARS-COV-2 RNA SPEC QL NAA+PROBE: SIGNIFICANT CHANGE UP

## 2022-07-22 PROCEDURE — U0005: CPT

## 2022-07-22 PROCEDURE — 96375 TX/PRO/DX INJ NEW DRUG ADDON: CPT

## 2022-07-22 PROCEDURE — 81001 URINALYSIS AUTO W/SCOPE: CPT

## 2022-07-22 PROCEDURE — 93971 EXTREMITY STUDY: CPT

## 2022-07-22 PROCEDURE — 73201 CT UPPER EXTREMITY W/DYE: CPT | Mod: ME

## 2022-07-22 PROCEDURE — 80053 COMPREHEN METABOLIC PANEL: CPT

## 2022-07-22 PROCEDURE — 96365 THER/PROPH/DIAG IV INF INIT: CPT

## 2022-07-22 PROCEDURE — 87040 BLOOD CULTURE FOR BACTERIA: CPT

## 2022-07-22 PROCEDURE — 85027 COMPLETE CBC AUTOMATED: CPT

## 2022-07-22 PROCEDURE — U0003: CPT

## 2022-07-22 PROCEDURE — 0225U NFCT DS DNA&RNA 21 SARSCOV2: CPT

## 2022-07-22 PROCEDURE — 85610 PROTHROMBIN TIME: CPT

## 2022-07-22 PROCEDURE — 83605 ASSAY OF LACTIC ACID: CPT

## 2022-07-22 PROCEDURE — 80048 BASIC METABOLIC PNL TOTAL CA: CPT

## 2022-07-22 PROCEDURE — 99285 EMERGENCY DEPT VISIT HI MDM: CPT | Mod: 25

## 2022-07-22 PROCEDURE — 82550 ASSAY OF CK (CPK): CPT

## 2022-07-22 PROCEDURE — 97163 PT EVAL HIGH COMPLEX 45 MIN: CPT

## 2022-07-22 PROCEDURE — 85025 COMPLETE CBC W/AUTO DIFF WBC: CPT

## 2022-07-22 PROCEDURE — 80202 ASSAY OF VANCOMYCIN: CPT

## 2022-07-22 PROCEDURE — 83735 ASSAY OF MAGNESIUM: CPT

## 2022-07-22 PROCEDURE — 99239 HOSP IP/OBS DSCHRG MGMT >30: CPT

## 2022-07-22 PROCEDURE — 85730 THROMBOPLASTIN TIME PARTIAL: CPT

## 2022-07-22 PROCEDURE — G1004: CPT

## 2022-07-22 PROCEDURE — 36415 COLL VENOUS BLD VENIPUNCTURE: CPT

## 2022-07-22 PROCEDURE — 92610 EVALUATE SWALLOWING FUNCTION: CPT

## 2022-07-22 PROCEDURE — 73090 X-RAY EXAM OF FOREARM: CPT

## 2022-07-22 PROCEDURE — 73110 X-RAY EXAM OF WRIST: CPT

## 2022-07-22 PROCEDURE — 73130 X-RAY EXAM OF HAND: CPT

## 2022-07-22 RX ADMIN — RIVAROXABAN 15 MILLIGRAM(S): KIT at 08:20

## 2022-07-22 RX ADMIN — Medication 75 MILLIGRAM(S): at 14:20

## 2022-07-22 RX ADMIN — Medication 5 MILLIGRAM(S): at 08:18

## 2022-07-22 RX ADMIN — Medication 60 MILLIGRAM(S): at 14:19

## 2022-07-22 RX ADMIN — Medication 1 APPLICATION(S): at 14:20

## 2022-07-22 RX ADMIN — Medication 60 MILLIGRAM(S): at 08:19

## 2022-07-22 RX ADMIN — Medication 50 MILLIGRAM(S): at 08:19

## 2022-07-22 NOTE — PROGRESS NOTE ADULT - PROBLEM SELECTOR PROBLEM 1
Acute deep vein thrombosis (DVT) of right upper extremity

## 2022-07-22 NOTE — PROGRESS NOTE ADULT - NUTRITIONAL ASSESSMENT
This patient has been assessed with a concern for Malnutrition and has been determined to have a diagnosis/diagnoses of Moderate protein-calorie malnutrition.    This patient is being managed with:   Diet Regular-  Entered: Jul 15 2022  9:08AM    
This patient has been assessed with a concern for Malnutrition and has been determined to have a diagnosis/diagnoses of Moderate protein-calorie malnutrition.    This patient is being managed with:   Diet Regular-  Entered: Jul 15 2022  9:08AM

## 2022-07-22 NOTE — PROGRESS NOTE ADULT - PROBLEM SELECTOR PLAN 1
Acute upper extremity DVT of the brachial vein   c/w Xarelto for 3 month
Acute upper extremity DVT of the brachial vein   c/w Xarelto
Acute upper extremity DVT of the brachial vein   c/w Xarelto
Acute upper extremity DVT of the brachial vein   c/w Xarelto  Etiology of the DVT is unclear, recent imaging performed at [facility] including CT angio does not reveal any suspicion lesions.  Could be catheter related.
Acute upper extremity DVT of the brachial vein   c/w Xarelto  Etiology of the DVT is unclear, recent imaging performed at [facility] including CT angio does not reveal any suspicion lesions.  Could be catheter related.
Acute upper extremity DVT of the brachial vein   c/w Xarelto    Etiology of the DVT is unclear, recent imaging performed at [facility] including CT angio does not reveal any suspicion lesions.  Could be catheter related.

## 2022-07-22 NOTE — PROGRESS NOTE ADULT - PROBLEM SELECTOR PROBLEM 2
Cellulitis
Speaking to mother    Fell off bed approx 2 hours ago, s/w friend that's a doctor, advised to just monitor. Up for 2 hours since accident but now pt is past bedtime but mom needs advice. Fall < 3 ft, normal size bed. Denies LOC, started crying, easily consoled.     Advised per protocol. VU and agreed. ED precautions given.     Reason for Disposition   [1] Transient pain or crying AND [2] no visible injury   Minor head injury (scalp swelling, bruise or tenderness)    Additional Information   Negative: [1] Major bleeding (actively dripping or spurting) AND [2] can't be stopped   Negative: [1] Large blood loss AND [2] fainted or too weak to stand   Negative: [1] ACUTE NEURO SYMPTOM AND [2] symptom persists  (DEFINITION: difficult to awaken or keep awake OR Altered Mental Status with confused thinking and talking OR slurred speech OR weakness of arms OR unsteady walking)   Negative: Seizure (convulsion) for > 1 minute   Negative: Knocked unconscious for > 1 minute   Negative: [1] Dangerous mechanism of  injury (e.g.,  MVA, diving, fall on trampoline, contact sports, fall > 10 feet, hanging) AND [2] NECK pain or stiffness present now AND [3] began < 1 hour after injury   Negative: Penetrating head injury (eg arrow, dart, pencil)   Negative: Sounds like a life-threatening emergency to the triager   Negative: [1] Neck pain (or shooting pains) OR neck stiffness (not moving neck normally) AND [2] follows any head injury   Negative: [1] Bleeding AND [2] won't stop after 10 minutes of direct pressure (using correct technique)   Negative: Skin is split open or gaping (if unsure, refer in if cut length > 1/4  inch or 6 mm on the face)   Negative: Can't remember what happened (amnesia)   Negative: Altered mental status suspected in young child (awake but not alert, not focused, slow to respond)   Negative: [1] Age 1- 2 years AND [2] swelling > 2 inches (5 cm) in size (Exception: forehead only location of hematoma, 
no need to see)   Negative: [1] Age < 12 months AND [2] swelling > 1 inch (2.5 cm)   Negative: Large dent in skull (especially if hit the edge of something)   Negative: Dangerous mechanism of injury caused by high speed (e.g., serious MVA), great height (e.g., over 10 feet) or severe blow from hard objects (e.g., golf club)   Negative: [1] Concerning falls (under 2 y o: over 3 feet; over 2 y o : over 5 feet; OR falls down stairways) AND [2] not acting normal after injury (Exception: crying less than 20 minutes immediately after injury)   Negative: Sounds like a serious injury to the triager   Negative: [1] ACUTE NEURO SYMPTOM AND [2] now fine (DEFINITION: difficult to awaken OR confused thinking and talking OR slurred speech OR weakness of arms OR unsteady walking)   Negative: [1] Seizure for < 1 minute AND [2] now fine   Negative: [1] Knocked unconscious < 1 minute AND [2] now fine   Negative: [1] Black eye(s) AND [2] onset within 48 hours of head injury   Negative: Age < 6 months (Exception: cried briefly, baby now acting normal, no physical findings, and minor-type injury with reasonable explanation)   Negative: [1] Age < 24 months AND [2] new onset of fussiness or pain lasts > 20 minutes AND [3] fussy now   Negative: [1] SEVERE headache (e.g., crying with pain) AND [2] not improved after 20 minutes of cold pack   Negative: Watery or blood-tinged fluid dripping from the NOSE or EARS now (Exception: tears from crying or nosebleed from nose injury)   Negative: [1] Vomited 2 or more times AND [2] within 24 hours of injury   Negative: [1] Blurred vision by child's report AND [2] persists > 5 minutes   Negative: Suspicious history for the injury (especially if not yet crawling)   Negative: High-risk child (e.g., bleeding disorder, V-P shunt, brain tumor, brain surgery, etc)   Negative: [1] Delayed onset of Neuro Symptom AND [2] begins within 3 days after head injury   Negative: [1] Concerning falls 
(under 2 y o: over 3 feet; over 2 y o: over 5 feet; OR falls down stairways) AND [2] acting completely normal now (Exception: if over 2 hours since injury, continue with triage)   Negative: [1] DIRTY minor wound AND [2] 2 or less tetanus shots (such as vaccine refusers)   Negative: [1] Concussion suspected by triager AND [2] NO Acute Neuro Symptoms   Negative: [1] Headache is main symptom AND [2] present > 24 hours (Exception: Only the injured scalp area is tender to touch with no generalized headache)   Negative: [1] Injury happened > 24 hours ago AND [2] child had reason to be seen urgently on day of injury BUT [3] wasn't seen and currently is improved or has no symptoms   Negative: [1] Scalp area tenderness is main symptom AND [2] persists > 3 days   Negative: [1] DIRTY cut or scrape AND [2] last tetanus shot > 5 years ago   Negative: [1] CLEAN cut or scrape AND [2] last tetanus shot > 10 years ago    Protocols used: HEAD INJURY-P-      
Cellulitis

## 2022-07-22 NOTE — PROGRESS NOTE ADULT - PROBLEM SELECTOR PLAN 4
BP controlled on Diltiazem and Metoprolol XL

## 2022-07-22 NOTE — PROGRESS NOTE ADULT - PROBLEM SELECTOR PLAN 3
Multiple skin tears on the right shoulder on chest wall  As per Bristal from lead placement   Local wound care
Multiple skin tears on the right shoulder on chest wall  As per Bristal from lead placement   wound care consult ordered.
Multiple skin tears on the right shoulder on chest wall  As per Bristal from lead placement   Local wound care
Multiple skin tears on the right shoulder on chest wall  As per Bristal from lead placement   wound care consult ordered.

## 2022-07-22 NOTE — PROGRESS NOTE ADULT - PROBLEM SELECTOR PROBLEM 3
Multiple skin tears

## 2022-07-22 NOTE — PROGRESS NOTE ADULT - PROBLEM SELECTOR PLAN 7
Continue prednisone 5

## 2022-07-22 NOTE — PROGRESS NOTE ADULT - REASON FOR ADMISSION
Redness/swelling

## 2022-07-22 NOTE — PROGRESS NOTE ADULT - PROBLEM SELECTOR PLAN 8
Stable for discharge. Pt is on regular diet with thin fluids.  Awaiting COVID PCR results
Will need PT evaluation  Patient at memory care unit over at [facility]   Patient is DNI but not DNR as per Molst form filled out on previous admission.
Stable for discharge. Pt is on regular diet with thin fluids.
Stable for discharge. Pt is on regular diet with thin fluids.
Will need PT evaluation  Patient at memory care unit over at [facility]   Patient is DNI but not DNR as per Molst form filled out on previous admission.    Spoke to patients son over the phone.
PT evaluation done   Patient at memory care unit over at [facility]   Patient is DNI but not DNR as per Molst form filled out on previous admission.    Spoke to patients son over the phone.

## 2022-07-22 NOTE — PROGRESS NOTE ADULT - ASSESSMENT
89y  Male with h/o Dementia, HTN, HLD, prostate CA, RA and hard of hearing. Patient presents from [facility] for right arm swelling and cellulitis. Overall patient has been declining he was admitted with COVID on 5/27 and then with pneumonia on 6/16 as well as fall on 7/4. Initially he was in the assisted living portion of the facility and now has been moved to the memory unit. While at the nursing home they noticed redness and swelling of his right arm with draining serosanguinous in nature. Patient was started on doxycycline without any improvement.  They do not describe any fevers at the facility. Sent to the ER. In the ER patient is afebrile, no leukocytosis. Started on Vancomycin.       Cellulitis   Leukocytosis       - Blood cultures 7/14 no growth   - RVP/COVID 19 PCR 7/15 negative   - CT RUE reporting cellulitis   - UA negative for UTI   - Continue Vancomycin  - Monitor trough  - Monitor for Vancomycin toxicity   - Plan for antibiotics till 7/21/22  - Follow up cultures  - Trend Fever  - Trend WBC      Will sign off. Please call PRN.         
89 yr old M w/a PMH of Dementia, HTN, HLD, prostate CA, RA and hard of hearing  presents from [facility] for right arm swelling and cellulitis
89 yr old M w/a PMH of Dementia, HTN, HLD, prostate CA, RA and hard of hearing  presents from [facility] for right arm swelling and cellulitis
89 yr old M w/a PMH of Dementia, HTN, HLD, prostate CA, RA and hard of hearing  presents from [facility] for right arm swelling and cellulitis
89 yr old M w/a PMH of Dementia, HTN, HLD, prostate CA, RA and hard of hearing  presents from [facility] for right arm swelling and cellulitis
89 yr old M w/a PMH of Dementia, HTN, HLD, prostate CA, RA and hard of hearing  presents from [facility] for right arm swelling and cellulitis
89 yr old M w/a PMH of Dementia, HTN, HLD, prostate CA, RA and hard of hearing  presents from [facility] for right arm swelling and cellulitis

## 2022-07-22 NOTE — PROGRESS NOTE ADULT - SUBJECTIVE AND OBJECTIVE BOX
Patient is a 89y old  Male who presents with a chief complaint of Redness/swelling (18 Jul 2022 10:42)      INTERVAL HPI/OVERNIGHT EVENTS:  Seen and examined. Comfortably sitting on the bed and eating. Pleasantly confused, but follows commands     MEDICATIONS  (STANDING):  atorvastatin 20 milliGRAM(s) Oral at bedtime  BACItracin   Ointment 1 Application(s) Topical daily  diltiazem    Tablet 60 milliGRAM(s) Oral four times a day  donepezil 10 milliGRAM(s) Oral at bedtime  metoprolol succinate ER 50 milliGRAM(s) Oral daily  mirtazapine 7.5 milliGRAM(s) Oral at bedtime  predniSONE   Tablet 5 milliGRAM(s) Oral daily  rivaroxaban 15 milliGRAM(s) Oral two times a day with meals  traZODone 100 milliGRAM(s) Oral at bedtime  vancomycin  IVPB 1000 milliGRAM(s) IV Intermittent every 24 hours  venlafaxine XR. 75 milliGRAM(s) Oral daily    MEDICATIONS  (PRN):  acetaminophen     Tablet .. 650 milliGRAM(s) Oral every 6 hours PRN Temp greater or equal to 38C (100.4F), Mild Pain (1 - 3)  morphine  - Injectable 1 milliGRAM(s) IV Push every 8 hours PRN Severe Pain (7 - 10)      Allergies    penicillin (Hives)    Intolerances        REVIEW OF SYSTEMS:  CONSTITUTIONAL: No fever, weight loss, or fatigue  RESPIRATORY: No cough, wheezing, chills or hemoptysis; No shortness of breath  CARDIOVASCULAR: No chest pain, palpitations, dizziness, or leg swelling  GASTROINTESTINAL: No abdominal or epigastric pain. No nausea, vomiting, or hematemesis; No diarrhea or constipation. No melena or hematochezia.  NEUROLOGICAL: No headaches, memory loss, loss of strength, numbness, or tremors  MUSCULOSKELETAL: No joint pain or swelling; No muscle, back, or extremity pain      Vital Signs Last 24 Hrs  T(C): 36.4 (21 Jul 2022 11:07), Max: 36.7 (20 Jul 2022 18:32)  T(F): 97.5 (21 Jul 2022 11:07), Max: 98 (20 Jul 2022 18:32)  HR: 72 (21 Jul 2022 11:07) (51 - 78)  BP: 159/82 (21 Jul 2022 11:07) (137/81 - 159/84)  BP(mean): --  RR: 18 (21 Jul 2022 11:07) (18 - 18)  SpO2: 93% (21 Jul 2022 11:07) (92% - 96%)    Parameters below as of 21 Jul 2022 11:07  Patient On (Oxygen Delivery Method): room air        PHYSICAL EXAM:  GENERAL: NAD, pleasantly confused   HEAD:  Atraumatic, Normocephalic  EYES: EOMI, PERRLA, conjunctiva and sclera clear  NECK: Supple, No JVD, Normal thyroid  NERVOUS SYSTEM:  Alert & Oriented X1, No gross focal deficits  CHEST/LUNG: Clear to percussion bilaterally; No rales, rhonchi, wheezing, or rubs  HEART: Regular rate and rhythm; No murmurs, rubs, or gallops  ABDOMEN: Soft, Nontender, Nondistended; Bowel sounds present  EXTREMITIES:  No clubbing, cyanosis, or edema  SKIN: multiple skin tears     LABS:                        11.2   8.14  )-----------( 203      ( 21 Jul 2022 05:19 )             33.9     07-21    142  |  109<H>  |  22.8<H>  ----------------------------<  91  3.9   |  25.0  |  0.97    Ca    7.8<L>      21 Jul 2022 05:19  Mg     2.1     07-21          CAPILLARY BLOOD GLUCOSE          RADIOLOGY & ADDITIONAL TESTS:    Imaging Personally Reviewed:  [ ] YES  [ ] NO    Consultant(s) Notes Reviewed:  [ ] YES  [ ] NO    Care Discussed with Consultants/Other Providers [ ] YES  [ ] NO    Plan of Care discussed with Housestaff [ ]YES [ ] NO

## 2022-07-22 NOTE — PROGRESS NOTE ADULT - PROBLEM SELECTOR PLAN 5
Continue with Lipitor 20

## 2022-07-22 NOTE — PROGRESS NOTE ADULT - PROBLEM SELECTOR PLAN 6
Donepezil, Mirtazapine and Venlafaxine

## 2022-07-22 NOTE — PROGRESS NOTE ADULT - SUBJECTIVE AND OBJECTIVE BOX
Patient is a 89y old  Male who presents with a chief complaint of Redness/swelling (18 Jul 2022 10:42)      INTERVAL HPI/OVERNIGHT EVENTS:  Seen and examined. Comfortably sitting on the bed and eating. Pleasantly confused, but follows commands     MEDICATIONS  (STANDING):  atorvastatin 20 milliGRAM(s) Oral at bedtime  BACItracin   Ointment 1 Application(s) Topical daily  diltiazem    Tablet 60 milliGRAM(s) Oral four times a day  donepezil 10 milliGRAM(s) Oral at bedtime  metoprolol succinate ER 50 milliGRAM(s) Oral daily  mirtazapine 7.5 milliGRAM(s) Oral at bedtime  predniSONE   Tablet 5 milliGRAM(s) Oral daily  rivaroxaban 15 milliGRAM(s) Oral two times a day with meals  traZODone 100 milliGRAM(s) Oral at bedtime  vancomycin  IVPB 1000 milliGRAM(s) IV Intermittent every 24 hours  venlafaxine XR. 75 milliGRAM(s) Oral daily    MEDICATIONS  (PRN):  acetaminophen     Tablet .. 650 milliGRAM(s) Oral every 6 hours PRN Temp greater or equal to 38C (100.4F), Mild Pain (1 - 3)  morphine  - Injectable 1 milliGRAM(s) IV Push every 8 hours PRN Severe Pain (7 - 10)      Allergies    penicillin (Hives)    Intolerances        REVIEW OF SYSTEMS:  CONSTITUTIONAL: No fever, weight loss, or fatigue  RESPIRATORY: No cough, wheezing, chills or hemoptysis; No shortness of breath  CARDIOVASCULAR: No chest pain, palpitations, dizziness, or leg swelling  GASTROINTESTINAL: No abdominal or epigastric pain. No nausea, vomiting, or hematemesis; No diarrhea or constipation. No melena or hematochezia.  NEUROLOGICAL: No headaches, memory loss, loss of strength, numbness, or tremors  MUSCULOSKELETAL: No joint pain or swelling; No muscle, back, or extremity pain      Vital Signs Last 24 Hrs  T(C): 36.4 (21 Jul 2022 11:07), Max: 36.7 (20 Jul 2022 18:32)  T(F): 97.5 (21 Jul 2022 11:07), Max: 98 (20 Jul 2022 18:32)  HR: 72 (21 Jul 2022 11:07) (51 - 78)  BP: 159/82 (21 Jul 2022 11:07) (137/81 - 159/84)  BP(mean): --  RR: 18 (21 Jul 2022 11:07) (18 - 18)  SpO2: 93% (21 Jul 2022 11:07) (92% - 96%)    Parameters below as of 21 Jul 2022 11:07  Patient On (Oxygen Delivery Method): room air        PHYSICAL EXAM:  GENERAL: NAD, pleasantly confused   HEAD:  Atraumatic, Normocephalic  EYES: EOMI, PERRLA, conjunctiva and sclera clear  NECK: Supple, No JVD, Normal thyroid  NERVOUS SYSTEM:  Alert & Oriented X1, No gross focal deficits  CHEST/LUNG: Clear to percussion bilaterally; No rales, rhonchi, wheezing, or rubs  HEART: Regular rate and rhythm; No murmurs, rubs, or gallops  ABDOMEN: Soft, Nontender, Nondistended; Bowel sounds present  EXTREMITIES:  No clubbing, cyanosis, or edema  SKIN: dressing in the chest wall, multiple skin tears     LABS:                        11.2   8.14  )-----------( 203      ( 21 Jul 2022 05:19 )             33.9     07-21    142  |  109<H>  |  22.8<H>  ----------------------------<  91  3.9   |  25.0  |  0.97    Ca    7.8<L>      21 Jul 2022 05:19  Mg     2.1     07-21          CAPILLARY BLOOD GLUCOSE          RADIOLOGY & ADDITIONAL TESTS:    Imaging Personally Reviewed:  [ ] YES  [ ] NO    Consultant(s) Notes Reviewed:  [ ] YES  [ ] NO    Care Discussed with Consultants/Other Providers [ ] YES  [ ] NO    Plan of Care discussed with Housestaff [ ]YES [ ] NO

## 2022-08-20 ENCOUNTER — INPATIENT (INPATIENT)
Facility: HOSPITAL | Age: 87
LOS: 10 days | Discharge: NURSING, REHAB OR LONG-TERM CARE | DRG: 871 | End: 2022-08-31
Attending: STUDENT IN AN ORGANIZED HEALTH CARE EDUCATION/TRAINING PROGRAM | Admitting: STUDENT IN AN ORGANIZED HEALTH CARE EDUCATION/TRAINING PROGRAM
Payer: MEDICARE

## 2022-08-20 VITALS
RESPIRATION RATE: 20 BRPM | OXYGEN SATURATION: 94 % | DIASTOLIC BLOOD PRESSURE: 88 MMHG | TEMPERATURE: 98 F | SYSTOLIC BLOOD PRESSURE: 151 MMHG | HEIGHT: 74 IN | WEIGHT: 199.08 LBS | HEART RATE: 111 BPM

## 2022-08-20 DIAGNOSIS — J12.9 VIRAL PNEUMONIA, UNSPECIFIED: ICD-10-CM

## 2022-08-20 DIAGNOSIS — R09.89 OTHER SPECIFIED SYMPTOMS AND SIGNS INVOLVING THE CIRCULATORY AND RESPIRATORY SYSTEMS: ICD-10-CM

## 2022-08-20 LAB
ALBUMIN SERPL ELPH-MCNC: 2.6 G/DL — LOW (ref 3.3–5.2)
ALP SERPL-CCNC: 68 U/L — SIGNIFICANT CHANGE UP (ref 40–120)
ALT FLD-CCNC: 13 U/L — SIGNIFICANT CHANGE UP
ANION GAP SERPL CALC-SCNC: 10 MMOL/L — SIGNIFICANT CHANGE UP (ref 5–17)
ANION GAP SERPL CALC-SCNC: 12 MMOL/L — SIGNIFICANT CHANGE UP (ref 5–17)
APPEARANCE UR: CLEAR — SIGNIFICANT CHANGE UP
APTT BLD: 29.3 SEC — SIGNIFICANT CHANGE UP (ref 27.5–35.5)
AST SERPL-CCNC: 36 U/L — SIGNIFICANT CHANGE UP
BASOPHILS # BLD AUTO: 0.02 K/UL — SIGNIFICANT CHANGE UP (ref 0–0.2)
BASOPHILS NFR BLD AUTO: 0.2 % — SIGNIFICANT CHANGE UP (ref 0–2)
BILIRUB SERPL-MCNC: 0.6 MG/DL — SIGNIFICANT CHANGE UP (ref 0.4–2)
BILIRUB UR-MCNC: NEGATIVE — SIGNIFICANT CHANGE UP
BUN SERPL-MCNC: 15.8 MG/DL — SIGNIFICANT CHANGE UP (ref 8–20)
BUN SERPL-MCNC: 16 MG/DL — SIGNIFICANT CHANGE UP (ref 8–20)
CALCIUM SERPL-MCNC: 8.2 MG/DL — LOW (ref 8.4–10.5)
CALCIUM SERPL-MCNC: 8.3 MG/DL — LOW (ref 8.4–10.5)
CHLORIDE SERPL-SCNC: 103 MMOL/L — SIGNIFICANT CHANGE UP (ref 98–107)
CHLORIDE SERPL-SCNC: 108 MMOL/L — HIGH (ref 98–107)
CO2 SERPL-SCNC: 20 MMOL/L — LOW (ref 22–29)
CO2 SERPL-SCNC: 25 MMOL/L — SIGNIFICANT CHANGE UP (ref 22–29)
COLOR SPEC: YELLOW — SIGNIFICANT CHANGE UP
CREAT SERPL-MCNC: 1 MG/DL — SIGNIFICANT CHANGE UP (ref 0.5–1.3)
CREAT SERPL-MCNC: 1.16 MG/DL — SIGNIFICANT CHANGE UP (ref 0.5–1.3)
DIFF PNL FLD: ABNORMAL
EGFR: 60 ML/MIN/1.73M2 — SIGNIFICANT CHANGE UP
EGFR: 72 ML/MIN/1.73M2 — SIGNIFICANT CHANGE UP
EOSINOPHIL # BLD AUTO: 0.18 K/UL — SIGNIFICANT CHANGE UP (ref 0–0.5)
EOSINOPHIL NFR BLD AUTO: 1.7 % — SIGNIFICANT CHANGE UP (ref 0–6)
EPI CELLS # UR: SIGNIFICANT CHANGE UP
GAS PNL BLDA: SIGNIFICANT CHANGE UP
GLUCOSE SERPL-MCNC: 132 MG/DL — HIGH (ref 70–99)
GLUCOSE SERPL-MCNC: 92 MG/DL — SIGNIFICANT CHANGE UP (ref 70–99)
GLUCOSE UR QL: NEGATIVE MG/DL — SIGNIFICANT CHANGE UP
HCT VFR BLD CALC: 35.5 % — LOW (ref 39–50)
HGB BLD-MCNC: 11.4 G/DL — LOW (ref 13–17)
IMM GRANULOCYTES NFR BLD AUTO: 0.4 % — SIGNIFICANT CHANGE UP (ref 0–1.5)
INR BLD: 1.43 RATIO — HIGH (ref 0.88–1.16)
KETONES UR-MCNC: NEGATIVE — SIGNIFICANT CHANGE UP
LEUKOCYTE ESTERASE UR-ACNC: ABNORMAL
LYMPHOCYTES # BLD AUTO: 0.89 K/UL — LOW (ref 1–3.3)
LYMPHOCYTES # BLD AUTO: 8.5 % — LOW (ref 13–44)
MCHC RBC-ENTMCNC: 30.2 PG — SIGNIFICANT CHANGE UP (ref 27–34)
MCHC RBC-ENTMCNC: 32.1 GM/DL — SIGNIFICANT CHANGE UP (ref 32–36)
MCV RBC AUTO: 94.2 FL — SIGNIFICANT CHANGE UP (ref 80–100)
MONOCYTES # BLD AUTO: 1.07 K/UL — HIGH (ref 0–0.9)
MONOCYTES NFR BLD AUTO: 10.2 % — SIGNIFICANT CHANGE UP (ref 2–14)
NEUTROPHILS # BLD AUTO: 8.32 K/UL — HIGH (ref 1.8–7.4)
NEUTROPHILS NFR BLD AUTO: 79 % — HIGH (ref 43–77)
NITRITE UR-MCNC: NEGATIVE — SIGNIFICANT CHANGE UP
NT-PROBNP SERPL-SCNC: 1077 PG/ML — HIGH (ref 0–300)
NT-PROBNP SERPL-SCNC: 750 PG/ML — HIGH (ref 0–300)
PH UR: 6 — SIGNIFICANT CHANGE UP (ref 5–8)
PLATELET # BLD AUTO: 172 K/UL — SIGNIFICANT CHANGE UP (ref 150–400)
POTASSIUM SERPL-MCNC: 4.5 MMOL/L — SIGNIFICANT CHANGE UP (ref 3.5–5.3)
POTASSIUM SERPL-MCNC: 5.4 MMOL/L — HIGH (ref 3.5–5.3)
POTASSIUM SERPL-SCNC: 4.5 MMOL/L — SIGNIFICANT CHANGE UP (ref 3.5–5.3)
POTASSIUM SERPL-SCNC: 5.4 MMOL/L — HIGH (ref 3.5–5.3)
PROT SERPL-MCNC: 5.6 G/DL — LOW (ref 6.6–8.7)
PROT UR-MCNC: NEGATIVE — SIGNIFICANT CHANGE UP
PROTHROM AB SERPL-ACNC: 16.7 SEC — HIGH (ref 10.5–13.4)
RAPID RVP RESULT: DETECTED
RBC # BLD: 3.77 M/UL — LOW (ref 4.2–5.8)
RBC # FLD: 14.7 % — HIGH (ref 10.3–14.5)
RBC CASTS # UR COMP ASSIST: SIGNIFICANT CHANGE UP /HPF (ref 0–4)
RV+EV RNA SPEC QL NAA+PROBE: DETECTED
SARS-COV-2 RNA SPEC QL NAA+PROBE: SIGNIFICANT CHANGE UP
SODIUM SERPL-SCNC: 138 MMOL/L — SIGNIFICANT CHANGE UP (ref 135–145)
SODIUM SERPL-SCNC: 139 MMOL/L — SIGNIFICANT CHANGE UP (ref 135–145)
SP GR SPEC: 1.01 — SIGNIFICANT CHANGE UP (ref 1.01–1.02)
TROPONIN T SERPL-MCNC: 0.01 NG/ML — SIGNIFICANT CHANGE UP (ref 0–0.06)
TROPONIN T SERPL-MCNC: 0.02 NG/ML — SIGNIFICANT CHANGE UP (ref 0–0.06)
UROBILINOGEN FLD QL: NEGATIVE MG/DL — SIGNIFICANT CHANGE UP
WBC # BLD: 10.52 K/UL — HIGH (ref 3.8–10.5)
WBC # FLD AUTO: 10.52 K/UL — HIGH (ref 3.8–10.5)
WBC UR QL: SIGNIFICANT CHANGE UP /HPF (ref 0–5)

## 2022-08-20 PROCEDURE — 71045 X-RAY EXAM CHEST 1 VIEW: CPT | Mod: 26

## 2022-08-20 PROCEDURE — 72125 CT NECK SPINE W/O DYE: CPT | Mod: 26,MA

## 2022-08-20 PROCEDURE — 99223 1ST HOSP IP/OBS HIGH 75: CPT | Mod: AI

## 2022-08-20 PROCEDURE — 71250 CT THORAX DX C-: CPT | Mod: 26,MA

## 2022-08-20 PROCEDURE — 99291 CRITICAL CARE FIRST HOUR: CPT | Mod: CS,GC

## 2022-08-20 PROCEDURE — 70450 CT HEAD/BRAIN W/O DYE: CPT | Mod: 26,MA

## 2022-08-20 PROCEDURE — 93010 ELECTROCARDIOGRAM REPORT: CPT | Mod: 76

## 2022-08-20 RX ORDER — SODIUM CHLORIDE 9 MG/ML
500 INJECTION INTRAMUSCULAR; INTRAVENOUS; SUBCUTANEOUS ONCE
Refills: 0 | Status: COMPLETED | OUTPATIENT
Start: 2022-08-20 | End: 2022-08-20

## 2022-08-20 RX ORDER — HALOPERIDOL DECANOATE 100 MG/ML
2 INJECTION INTRAMUSCULAR ONCE
Refills: 0 | Status: DISCONTINUED | OUTPATIENT
Start: 2022-08-20 | End: 2022-08-20

## 2022-08-20 RX ORDER — VENLAFAXINE HCL 75 MG
1 CAPSULE, EXT RELEASE 24 HR ORAL
Qty: 0 | Refills: 0 | DISCHARGE

## 2022-08-20 RX ORDER — RIVAROXABAN 15 MG-20MG
20 KIT ORAL
Refills: 0 | Status: DISCONTINUED | OUTPATIENT
Start: 2022-08-20 | End: 2022-08-21

## 2022-08-20 RX ORDER — AZITHROMYCIN 500 MG/1
500 TABLET, FILM COATED ORAL DAILY
Refills: 0 | Status: DISCONTINUED | OUTPATIENT
Start: 2022-08-20 | End: 2022-08-24

## 2022-08-20 RX ORDER — FUROSEMIDE 40 MG
40 TABLET ORAL ONCE
Refills: 0 | Status: COMPLETED | OUTPATIENT
Start: 2022-08-20 | End: 2022-08-20

## 2022-08-20 RX ORDER — AZITHROMYCIN 500 MG/1
500 TABLET, FILM COATED ORAL ONCE
Refills: 0 | Status: COMPLETED | OUTPATIENT
Start: 2022-08-20 | End: 2022-08-20

## 2022-08-20 RX ORDER — CEFTRIAXONE 500 MG/1
1000 INJECTION, POWDER, FOR SOLUTION INTRAMUSCULAR; INTRAVENOUS EVERY 24 HOURS
Refills: 0 | Status: DISCONTINUED | OUTPATIENT
Start: 2022-08-21 | End: 2022-08-24

## 2022-08-20 RX ORDER — IPRATROPIUM/ALBUTEROL SULFATE 18-103MCG
3 AEROSOL WITH ADAPTER (GRAM) INHALATION EVERY 8 HOURS
Refills: 0 | Status: DISCONTINUED | OUTPATIENT
Start: 2022-08-20 | End: 2022-08-21

## 2022-08-20 RX ORDER — DONEPEZIL HYDROCHLORIDE 10 MG/1
1 TABLET, FILM COATED ORAL
Qty: 0 | Refills: 0 | DISCHARGE

## 2022-08-20 RX ORDER — ATORVASTATIN CALCIUM 80 MG/1
20 TABLET, FILM COATED ORAL AT BEDTIME
Refills: 0 | Status: DISCONTINUED | OUTPATIENT
Start: 2022-08-20 | End: 2022-08-21

## 2022-08-20 RX ORDER — METOPROLOL TARTRATE 50 MG
50 TABLET ORAL DAILY
Refills: 0 | Status: DISCONTINUED | OUTPATIENT
Start: 2022-08-20 | End: 2022-08-22

## 2022-08-20 RX ORDER — TRAZODONE HCL 50 MG
1 TABLET ORAL
Qty: 0 | Refills: 0 | DISCHARGE

## 2022-08-20 RX ORDER — MIRTAZAPINE 45 MG/1
15 TABLET, ORALLY DISINTEGRATING ORAL AT BEDTIME
Refills: 0 | Status: DISCONTINUED | OUTPATIENT
Start: 2022-08-20 | End: 2022-08-21

## 2022-08-20 RX ORDER — CEFTRIAXONE 500 MG/1
1000 INJECTION, POWDER, FOR SOLUTION INTRAMUSCULAR; INTRAVENOUS ONCE
Refills: 0 | Status: COMPLETED | OUTPATIENT
Start: 2022-08-20 | End: 2022-08-20

## 2022-08-20 RX ORDER — TRAZODONE HCL 50 MG
100 TABLET ORAL AT BEDTIME
Refills: 0 | Status: DISCONTINUED | OUTPATIENT
Start: 2022-08-20 | End: 2022-08-21

## 2022-08-20 RX ORDER — DILTIAZEM HCL 120 MG
60 CAPSULE, EXT RELEASE 24 HR ORAL
Refills: 0 | Status: DISCONTINUED | OUTPATIENT
Start: 2022-08-20 | End: 2022-08-21

## 2022-08-20 RX ORDER — MIRTAZAPINE 45 MG/1
1 TABLET, ORALLY DISINTEGRATING ORAL
Qty: 0 | Refills: 0 | DISCHARGE

## 2022-08-20 RX ORDER — FUROSEMIDE 40 MG
40 TABLET ORAL DAILY
Refills: 0 | Status: DISCONTINUED | OUTPATIENT
Start: 2022-08-20 | End: 2022-08-22

## 2022-08-20 RX ORDER — ATORVASTATIN CALCIUM 80 MG/1
1 TABLET, FILM COATED ORAL
Qty: 0 | Refills: 0 | DISCHARGE

## 2022-08-20 RX ORDER — DILTIAZEM HCL 120 MG
1 CAPSULE, EXT RELEASE 24 HR ORAL
Qty: 0 | Refills: 0 | DISCHARGE

## 2022-08-20 RX ORDER — VIBEGRON 75 MG/1
1 TABLET, FILM COATED ORAL
Qty: 0 | Refills: 0 | DISCHARGE

## 2022-08-20 RX ORDER — METOPROLOL TARTRATE 50 MG
5 TABLET ORAL EVERY 6 HOURS
Refills: 0 | Status: DISCONTINUED | OUTPATIENT
Start: 2022-08-20 | End: 2022-08-21

## 2022-08-20 RX ORDER — DONEPEZIL HYDROCHLORIDE 10 MG/1
10 TABLET, FILM COATED ORAL AT BEDTIME
Refills: 0 | Status: DISCONTINUED | OUTPATIENT
Start: 2022-08-20 | End: 2022-08-21

## 2022-08-20 RX ORDER — MIRTAZAPINE 45 MG/1
2 TABLET, ORALLY DISINTEGRATING ORAL
Qty: 0 | Refills: 0 | DISCHARGE

## 2022-08-20 RX ADMIN — CEFTRIAXONE 100 MILLIGRAM(S): 500 INJECTION, POWDER, FOR SOLUTION INTRAMUSCULAR; INTRAVENOUS at 10:54

## 2022-08-20 RX ADMIN — Medication 60 MILLIGRAM(S): at 21:47

## 2022-08-20 RX ADMIN — Medication 50 MILLIGRAM(S): at 21:47

## 2022-08-20 RX ADMIN — AZITHROMYCIN 500 MILLIGRAM(S): 500 TABLET, FILM COATED ORAL at 21:48

## 2022-08-20 RX ADMIN — Medication 5 MILLIGRAM(S): at 19:13

## 2022-08-20 RX ADMIN — MIRTAZAPINE 15 MILLIGRAM(S): 45 TABLET, ORALLY DISINTEGRATING ORAL at 21:48

## 2022-08-20 RX ADMIN — Medication 40 MILLIGRAM(S): at 21:46

## 2022-08-20 RX ADMIN — Medication 100 MILLIGRAM(S): at 21:46

## 2022-08-20 RX ADMIN — AZITHROMYCIN 255 MILLIGRAM(S): 500 TABLET, FILM COATED ORAL at 10:52

## 2022-08-20 RX ADMIN — Medication 5 MILLIGRAM(S): at 21:48

## 2022-08-20 RX ADMIN — SODIUM CHLORIDE 500 MILLILITER(S): 9 INJECTION INTRAMUSCULAR; INTRAVENOUS; SUBCUTANEOUS at 11:59

## 2022-08-20 RX ADMIN — DONEPEZIL HYDROCHLORIDE 10 MILLIGRAM(S): 10 TABLET, FILM COATED ORAL at 21:47

## 2022-08-20 RX ADMIN — ATORVASTATIN CALCIUM 20 MILLIGRAM(S): 80 TABLET, FILM COATED ORAL at 21:46

## 2022-08-20 RX ADMIN — Medication 40 MILLIGRAM(S): at 11:20

## 2022-08-20 NOTE — H&P ADULT - ASSESSMENT
89yr old M w/a PMH of Dementia, HTN, HLD, prostate CA, RA, hard of hearing, RUE DVT, Cellulitis presents from [facility] after a fall noted to have tachycardia, increased wob with hypoxia, edema, elevated prbnp, + enterovirus admitted to stepdown unit for AHRF in setting of CHF exacerbation and Viral PNA.    Acute Resp Failure with hypoxia  AOC CHF exacerbation likely systolic  Viral PNA / Enterovirus with significant mucous plugging  Left pleural effusion  Sepsis present on admission - tachycardia, leukocytosis  IV lasix  Strict I/O   TTE  [place] for now for acute monitoring   CTZ/Azithro  DUONEBS  Currently on BIPAP - Mental status improved compared ot presentation per son. Can consider weaning from BIPAP and trial of NC or venturi or HFNC  Supportive care  trend wbc and white count     RUE DVT  Cont Xarelto - transition to daily dosing     CHF  Cont home metop and dilt    RA  Patient has been on long-term prednisone. Per son many years. Does not have a rheumatologist. Unclear if patient was attempted to be weaned off  Not prescribed pcp prophylaxis   Will cont Prednisone for now to prevent adrenal insufficiency.     Dementia   Cont donepezil    DVT ppx: Xarelto  Dispo: Stepdown Level of Care  CODE Status: DNR/DNI  Good is son/hcp -    Daughter in Law

## 2022-08-20 NOTE — ED ADULT TRIAGE NOTE - CHIEF COMPLAINT QUOTE
Patient presents to ER for medical evaluation, reports fall while in [facility], patient denies pain, awake and alert, skin tear to right arm, resp even/unlabored.

## 2022-08-20 NOTE — H&P ADULT - NSICDXPASTMEDICALHX_GEN_ALL_CORE_FT
PAST MEDICAL HISTORY:  Dementia     H/O: depression     HLD (hyperlipidemia)     [place] (hard of hearing)     HTN (hypertension)     Prostate cancer

## 2022-08-20 NOTE — ED ADULT NURSE NOTE - OBJECTIVE STATEMENT
Pt BIBA, as per EMS pt had a unwitnessed fall, pt presents with persistent cough and ronchi, pt is alert and oriented to self, slow to follow commands, pt is [place], Edema noted in BL lower and upper extremities. Pt skin is frail, mild redness in sacrum, multiple lesions throughout. Scab on right knee noted.

## 2022-08-20 NOTE — ED ADULT NURSE REASSESSMENT NOTE - NS ED NURSE REASSESS COMMENT FT1
Pt is tachycardic to 150's, tachypnea, sats 90% on ra, pt has persistent wet cough MD Little made aware, cardiology PA at bedside. Pt placed on 4L NC sats 94%, Lasix IV administered, IV abx given.

## 2022-08-20 NOTE — ED ADULT NURSE REASSESSMENT NOTE - NS ED NURSE REASSESS COMMENT FT1
assumed care of pt 1400 from [place], pt alert, on bipap, thrashing in bed. MD at bedside. valdes to be placed as per MD request. son at bedside. pt tachycardic 130. pt sheets changed, and cleaned

## 2022-08-20 NOTE — CONSULT NOTE ADULT - SUBJECTIVE AND OBJECTIVE BOX
Patient is a 89y old  Male who presents with a chief complaint of SOB (20 Aug 2022 15:26)      HPI: 89 yr old M w/a PMH of Dementia, HTN, HLD, prostate CA, RA and hard of hearing  presents from [facility] for SOB. In the ER was found to be mildly hypoxic with increased WOB. CXR and CT consistent with pulmonary edema, he was placed on BiPAP and given lasix 40mg IVP as well as 500cc of NS. ICu called for consult given BIPAP.     Patient seen and examined at the bedside. He is an elderly male, lying in bed on BIPAP. He is unable to provide any meaningful history and he is A+Ox0, but awake and alert. Appears to have tachypnea on BiPAP, FIo2 50% with SpO2 96%. He appears to have wet sheets indicating he is making urine. I spoke to his son Good over the phone and had an extensive discussion about goals of care. I explained he current medical condition and potential outcomes. After a meaningful discussion we decided on making him a DNR/DNI as he would not like his dad to suffer. He would like to treat him as best as we can without doing anything too aggressive.     PAST MEDICAL & SURGICAL HISTORY:  HTN (hypertension)      HLD (hyperlipidemia)      Dementia      Prostate cancer      H/O: depression      [place] (hard of hearing)      No significant past surgical history          Review of Systems:  unable to obtain due to patient's clinical condition       Medications:        haloperidol    Injectable 2 milliGRAM(s) IV Push Once          ICU Vital Signs Last 24 Hrs  T(C): 36.9 (20 Aug 2022 09:08), Max: 36.9 (20 Aug 2022 09:08)  T(F): 98.5 (20 Aug 2022 09:08), Max: 98.5 (20 Aug 2022 09:08)  HR: 126 (20 Aug 2022 15:47) (111 - 138)  BP: 141/71 (20 Aug 2022 14:27) (141/71 - 177/91)  BP(mean): --  ABP: --  ABP(mean): --  RR: 26 (20 Aug 2022 14:27) (20 - 30)  SpO2: 100% (20 Aug 2022 15:47) (92% - 100%)    O2 Parameters below as of 20 Aug 2022 14:27  Patient On (Oxygen Delivery Method): BiPAP/CPAP  O2 Flow (L/min): 50          ABG - ( 20 Aug 2022 12:53 )  pH, Arterial: 7.450 pH, Blood: x     /  pCO2: 34    /  pO2: 150   / HCO3: 24    / Base Excess: -0.4  /  SaO2: 100.0               I&O's Detail        LABS:                        11.4   10.52 )-----------( 172      ( 20 Aug 2022 09:30 )             35.5     08-20    139  |  103  |  15.8  ----------------------------<  132<H>  4.5   |  25.0  |  1.16    Ca    8.3<L>      20 Aug 2022 13:49    TPro  5.6<L>  /  Alb  2.6<L>  /  TBili  0.6  /  DBili  x   /  AST  36  /  ALT  13  /  AlkPhos  68  08-20      CARDIAC MARKERS ( 20 Aug 2022 13:49 )  x     / 0.02 ng/mL / x     / x     / x      CARDIAC MARKERS ( 20 Aug 2022 09:30 )  x     / 0.01 ng/mL / x     / x     / x          CAPILLARY BLOOD GLUCOSE        PT/INR - ( 20 Aug 2022 10:35 )   PT: 16.7 sec;   INR: 1.43 ratio         PTT - ( 20 Aug 2022 10:35 )  PTT:29.3 sec    CULTURES:  Rapid RVP Result: Detected (08-20-22 @ 09:30)      Physical Examination:    General: ill appearing, elderly male lying in bed     HEENT: Pupils equal, reactive to light.  Symmetric.    PULM: diminished breath sounds, rales and rhonchi bilaterally     CVS: tachycardia, regular rhythm     ABD: Soft, distended, nontender, normoactive bowel sounds    EXT: 3+ pitting edema noted b/l LE     SKIN: Warm       RADIOLOGY: ACC: [id number] EXAM:  CT CHEST                          PROCEDURE DATE:  08/20/2022          INTERPRETATION:  HISTORY: Fall. Trauma.    EXAMINATION: CT CHEST was performed without IV contrast.    COMPARISON: 6/12/2022.    FINDINGS:    AIRWAYS, LUNGS, PLEURA: Mucoid impacted right lower lobe segmental   airways. Unchanged right upper lobe 0.9 cm nodular opacity (image 84,   series 4). Bilateral lower lobe subsegmental atelectasis. Small left   pleural effusion. No pneumothorax.    MEDIASTINUM: Heart size qualitatively normal. Coronary atherosclerosis.   No pericardial effusion. Thoracic aorta normal caliber. No large   mediastinal nodes.    IMAGED ABDOMEN: Unremarkable.    SOFT TISSUES: Unremarkable.    BONES: No acute fracture detected.    IMPRESSION:.    No acute fracture detected.    Small left pleural effusion.    --- End of Report ---        TINO FULTON MD; Attending Radiologist  This document has been electronically signed. Aug 20 2022 10:17AM        CRITICAL CARE TIME SPENT: 60 minutes assessing presenting problems of acute illness, which pose high probability of life threatening deterioration or end organ damage/dysfunction, as well as medical decision making including initiating plan of care, reviewing data, reviewing radiologic exams, discussing with multidisciplinary team,  discussing goals of care with patient/family, and writing this note.  Non-inclusive of procedures performed,   Evaluating/treating patient, reviewing data/labs/imaging, discussing case with multidisciplinary team, discussing plan/goals of care with patient/family. Non-inclusive of procedure time.

## 2022-08-20 NOTE — ED PROVIDER NOTE - NSICDXPASTMEDICALHX_GEN_ALL_CORE_FT
PAST MEDICAL HISTORY:  Dementia     H/O: depression     HLD (hyperlipidemia)     [place] (hard of hearing)     HTN (hypertension)     Prostate cancer

## 2022-08-20 NOTE — ED PROVIDER NOTE - PHYSICAL EXAMINATION
Gen: NAD  Head: NC/AT  Neck: trachea midline  Resp:  No distress. Coarse breath sounds bilaterally.   Cardiac: Tachycardic rate and regular rhythm. B/L LE edema.   Abdomen: Soft, nontender, nondistended.   Ext: no deformities  Neuro:  A&O to self. appears non focal  Skin:  Warm and dry as visualized

## 2022-08-20 NOTE — CONSULT NOTE ADULT - ASSESSMENT
89 yr old M w/a PMH of Dementia, HTN, HLD, prostate CA, RA and hard of hearing  presents from [facility] for SOB.  Suspect patient has CHF exacerbation with superimposed bacterial and viral PNA. He is enterovirus + and CT scan as well as physical exam suggests pulmonary edema.     Problem List:  1) Acute CHF  2) Pulmonary edema  3) lobar PNA  4) Enterovirus  5) mucus plugging    At this time patient does not require ICU level of care as we had a goals of care discussion with his son which indicated he would not want aggressive therapies.  Would treat him for potential bacterial PNA given his CT scan and being enterovirus +  Check blood cultures and sputum if able  Cover with broad spectrum abx such as zosyn and check MRSA nares, cover for MRSA given he lives at [facility]  Cardiology is following and he had an echo in 4/2021 which showed preserved EF, he may have diastolic dysfunction, would get stat echo  He does appear volume overloaded and would diurese with lasix  BiPAP is in place for now which is helping he work of breathing and providing positive pressure. Given his dementia and poor understanding of his medical situation he continues to reach for the mask and remove it. Attempt to give haldol or zyprexa to control his agitation as benzos would most likely make his delirium much worse. If able to take PO seroquel may work well for him.   Would place him on chest PT, duonebs for mucus clearance   I spoke to his son Good over the phone and had an extensive discussion about goals of care. I explained he current medical condition and potential outcomes. After a meaningful discussion we decided on making him a DNR/DNI as he would not like his dad to suffer. He would like to treat him as best as we can without doing anything too aggressive.   Please reconsult for questions or concerns  The numbers for Good and his wife Dory are in the provider handoff section of the chart  Plan discussed with ICU attending Dr. Tamayo    89 yr old M w/a PMH of Dementia, HTN, HLD, prostate CA, RA and hard of hearing  presents from [facility] for SOB.  Suspect patient has CHF exacerbation with superimposed bacterial and viral PNA. He is enterovirus + and CT scan as well as physical exam suggests pulmonary edema.     Problem List:  1) Acute CHF  2) Pulmonary edema  3) lobar PNA  4) Enterovirus  5) mucus plugging  6) acute hypoxic respiratory failure    At this time patient does not require ICU level of care as we had a goals of care discussion with his son which indicated he would not want aggressive therapies.  Would treat him for potential bacterial PNA given his CT scan and being enterovirus +  Check blood cultures and sputum if able  Cover with broad spectrum abx such as zosyn and check MRSA nares, cover for MRSA given he lives at [facility]  Cardiology is following and he had an echo in 4/2021 which showed preserved EF, he may have diastolic dysfunction, would get stat echo  He does appear volume overloaded and would diurese with lasix  BiPAP is in place for now which is helping he work of breathing and providing positive pressure. Given his dementia and poor understanding of his medical situation he continues to reach for the mask and remove it. Attempt to give haldol or zyprexa to control his agitation as benzos would most likely make his delirium much worse. If able to take PO seroquel may work well for him.   Would place him on chest PT, duonebs for mucus clearance   I spoke to his son Good over the phone and had an extensive discussion about goals of care. I explained he current medical condition and potential outcomes. After a meaningful discussion we decided on making him a DNR/DNI as he would not like his dad to suffer. He would like to treat him as best as we can without doing anything too aggressive.   Please reconsult for questions or concerns  The numbers for Good and his wife Dory are in the provider handoff section of the chart  Plan discussed with ICU attending Dr. Tamayo

## 2022-08-20 NOTE — H&P ADULT - NSHPPHYSICALEXAM_GEN_ALL_CORE
VITALS:   T(C): 36.9 (08-20-22 @ 09:08), Max: 36.9 (08-20-22 @ 09:08)  HR: 133 (08-20-22 @ 16:11) (111 - 138)  BP: 156/73 (08-20-22 @ 16:11) (141/71 - 177/91)  RR: 30 (08-20-22 @ 16:11) (20 - 30)  SpO2: 100% (08-20-22 @ 16:11) (92% - 100%)    GENERAL: NAD, lying in bed comfortably  HEAD:  Atraumatic, Normocephalic  EYES: EOMI, PERRLA, conjunctiva and sclera clear  ENT: Moist mucous membranes  NECK: Supple, No JVD  CHEST/LUNG: coarse to auscultation bilaterally but no wheezing, tachypneic  HEART: Regular rate and rhythm; No murmurs, rubs, or gallops  ABDOMEN: BSx4; Soft, nontender, nondistended  EXTREMITIES:  2+ Peripheral Pulses, brisk capillary refill. No clubbing, cyanosis, or edema  NERVOUS SYSTEM:  A&Ox3, no focal deficits   SKIN: No rashes or lesions  PSYCH: Normal affect, euthymic mood

## 2022-08-20 NOTE — ED PROVIDER NOTE - ATTENDING CONTRIBUTION TO CARE
I have personally performed a face to face medical and diagnostic evaluation of the patient. I have discussed with and reviewed the resident's note and agree with the History, ROS, Physical Exam and MDM unless otherwise indicated. A brief summary of my personal evaluation and impression can be found below.    88yo man PMH HTN, HLD, dementia, DVT on xarelto was sent from NH for evaluation of fall and edema. Pt is awake and alert and follows commands but does not provide much history and was sent to CT for priority CT.    On exam, initial vitals were reviewed.  Pt is an elderly man in mild respiratory distress with noisy breathing but able to speak in full sentences but does not provide clear history of why he was sent to ED. NC/AT, clear eyes, dry mucous membranes, supple neck, tachycardic, extremity pitting edema bilaterally, pulses intact and equal in distal extremities, tachypneic, hypoxic to 86% on room air with improvement with 4L NC, crackles in bilateral lung fields, abdomen soft, nontender, nondistended, no obvious joint deformities, pt is awake and alert and oriented x 1-2 and moving all extremities without difficulty    Concern for infectious vs cardiac etiology of SOB. Evaluated for traumatic injuries as pt has poor hx.   Pt had increased work of breathing and placed on BiPAP. ICU consulted. I have personally performed a face to face medical and diagnostic evaluation of the patient. I have discussed with and reviewed the resident's note and agree with the History, ROS, Physical Exam and MDM unless otherwise indicated. A brief summary of my personal evaluation and impression can be found below.    88yo man PMH HTN, HLD, dementia, DVT on xarelto was sent from NH for evaluation of fall and edema. Pt is awake and alert and follows commands but does not provide much history and was sent to CT for priority CT.    On exam, initial vitals were reviewed.  Pt is an elderly man in mild respiratory distress with noisy breathing but able to speak in full sentences but does not provide clear history of why he was sent to ED. NC/AT, clear eyes, dry mucous membranes, supple neck, tachycardic, extremity pitting edema bilaterally, pulses intact and equal in distal extremities, tachypneic, hypoxic to 86% on room air with improvement with 4L NC, crackles in bilateral lung fields, abdomen soft, nontender, nondistended, no obvious joint deformities, pt is awake and alert and oriented x 1-2 and moving all extremities without difficulty    Concern for infectious vs cardiac etiology of SOB. Evaluated for traumatic injuries as pt has poor hx.   Pt had increased work of breathing and placed on BiPAP. ICU consulted.    Upon my evaluation, this patient had a high probability of imminent or life-threatening deterioration due to shortness of breath, which required my direct attention, intervention, and personal management. Frequent personal assessment and adjustment of medical interventions was performed.      I have personally provided 45 minutes of critical care time exclusive of time spent on separately billable procedures. Time includes review of laboratory data, radiology results, discussion with consultants, patient and family; monitoring for potential decompensation, as well as time spent retrieving data and reviewing the chart and documenting the visit. Interventions were performed as documented above.

## 2022-08-20 NOTE — H&P ADULT - NSHPLABSRESULTS_GEN_ALL_CORE
11.4   10.52  )----------(  172       ( 20 Aug 2022 09:30 )               35.5      139    |  103    |  15.8   ----------------------------<  132        ( 20 Aug 2022 13:49 )  4.5     |  25.0   |  1.16     Ca    8.3        ( 20 Aug 2022 13:49 )    TPro  5.6    /  Alb  2.6    /  TBili  0.6    /  DBili  x      /  AST  36     /  ALT  13     /  AlkPhos  68     ( 20 Aug 2022 09:30 )    LIVER FUNCTIONS - ( 20 Aug 2022 09:30 )  Alb: 2.6 g/dL / Pro: 5.6 g/dL / ALK PHOS: 68 U/L / ALT: 13 U/L / AST: 36 U/L / GGT: x           PT/INR -  16.7 sec / 1.43 ratio   ( 20 Aug 2022 10:35 )       PTT -  29.3 sec   ( 20 Aug 2022 10:35 )  CAPILLARY BLOOD GLUCOSE    CARDIAC MARKERS ( 20 Aug 2022 13:49 )  x     / 0.02 ng/mL / x     / x     / x      CARDIAC MARKERS ( 20 Aug 2022 09:30 )  x     / 0.01 ng/mL / x     / x     / x

## 2022-08-20 NOTE — ED PROVIDER NOTE - OBJECTIVE STATEMENT
89 yo male history of Dementia, HTN, HLD presents from [facility] s/p fall. Patient recently at [facility] where treated for RUE cellulitis and subsequent RUE DVT (currently on Xarelto). Patient is alert and nonfocal, though poor historian, thus history from the patient is extremely limited. Patient taken to CT scan for emergent imaging.

## 2022-08-20 NOTE — H&P ADULT - HISTORY OF PRESENT ILLNESS
89 yr old M w/a PMH of Dementia, HTN, HLD, prostate CA, RA and hard of hearing  presents from [facility] after a fall.  Found to be tachycardic to 150 with hypoxia to high 80's sob with wet cough   89 yr old M w/a PMH of Dementia, HTN, HLD, prostate CA, RA, hard of hearing, RUE DVT, Cellulitis  presents from [facility] after a fall.  Found to be tachycardic to 150 with hypoxia to high 80's sob with wet cough, peripheral edema. With persistently increased wob after NC and started on BIPAP. ICU was consulted. GOC discussions with family resulted in decision to pursue DNR/DNI. Patient was given IV lasix, ctx, az and admitted to medicine stepdown.     Patient is currently alert and oriented to self. Unable to speak while on BIPAP. Son Good at bedside reports that he has history of dementia but is conversational and has frequent memory loss. More recently has needed assistance with adls. Over last week has been more confused and ill appearing. Reports that today his legs are more swollen and edematous compared to last weekend.    Denie nausea, vomiting diarrhea, cp, ha, dysuria

## 2022-08-20 NOTE — ED PROVIDER NOTE - CLINICAL SUMMARY MEDICAL DECISION MAKING FREE TEXT BOX
89 yo male presents s/p fall at NH. Recent history of cellulitis, pneumonia. Will obtain imaging, labs, ekg. Monitor and reassess.

## 2022-08-21 LAB
ANION GAP SERPL CALC-SCNC: 12 MMOL/L — SIGNIFICANT CHANGE UP (ref 5–17)
BASOPHILS # BLD AUTO: 0.03 K/UL — SIGNIFICANT CHANGE UP (ref 0–0.2)
BASOPHILS NFR BLD AUTO: 0.2 % — SIGNIFICANT CHANGE UP (ref 0–2)
BUN SERPL-MCNC: 21.9 MG/DL — HIGH (ref 8–20)
CALCIUM SERPL-MCNC: 8.2 MG/DL — LOW (ref 8.4–10.5)
CHLORIDE SERPL-SCNC: 105 MMOL/L — SIGNIFICANT CHANGE UP (ref 98–107)
CO2 SERPL-SCNC: 26 MMOL/L — SIGNIFICANT CHANGE UP (ref 22–29)
CREAT SERPL-MCNC: 1.31 MG/DL — HIGH (ref 0.5–1.3)
CULTURE RESULTS: NO GROWTH — SIGNIFICANT CHANGE UP
EGFR: 52 ML/MIN/1.73M2 — LOW
EOSINOPHIL # BLD AUTO: 0 K/UL — SIGNIFICANT CHANGE UP (ref 0–0.5)
EOSINOPHIL NFR BLD AUTO: 0 % — SIGNIFICANT CHANGE UP (ref 0–6)
GLUCOSE SERPL-MCNC: 137 MG/DL — HIGH (ref 70–99)
HCT VFR BLD CALC: 31.2 % — LOW (ref 39–50)
HCT VFR BLD CALC: 35.4 % — LOW (ref 39–50)
HGB BLD-MCNC: 11.1 G/DL — LOW (ref 13–17)
HGB BLD-MCNC: 9.8 G/DL — LOW (ref 13–17)
IMM GRANULOCYTES NFR BLD AUTO: 0.3 % — SIGNIFICANT CHANGE UP (ref 0–1.5)
LYMPHOCYTES # BLD AUTO: 0.6 K/UL — LOW (ref 1–3.3)
LYMPHOCYTES # BLD AUTO: 4.1 % — LOW (ref 13–44)
MAGNESIUM SERPL-MCNC: 1.8 MG/DL — SIGNIFICANT CHANGE UP (ref 1.6–2.6)
MCHC RBC-ENTMCNC: 29.3 PG — SIGNIFICANT CHANGE UP (ref 27–34)
MCHC RBC-ENTMCNC: 29.8 PG — SIGNIFICANT CHANGE UP (ref 27–34)
MCHC RBC-ENTMCNC: 31.4 GM/DL — LOW (ref 32–36)
MCHC RBC-ENTMCNC: 31.4 GM/DL — LOW (ref 32–36)
MCV RBC AUTO: 93.4 FL — SIGNIFICANT CHANGE UP (ref 80–100)
MCV RBC AUTO: 94.9 FL — SIGNIFICANT CHANGE UP (ref 80–100)
MONOCYTES # BLD AUTO: 1 K/UL — HIGH (ref 0–0.9)
MONOCYTES NFR BLD AUTO: 6.8 % — SIGNIFICANT CHANGE UP (ref 2–14)
NEUTROPHILS # BLD AUTO: 13.05 K/UL — HIGH (ref 1.8–7.4)
NEUTROPHILS NFR BLD AUTO: 88.6 % — HIGH (ref 43–77)
PHOSPHATE SERPL-MCNC: 3.6 MG/DL — SIGNIFICANT CHANGE UP (ref 2.4–4.7)
PLATELET # BLD AUTO: 163 K/UL — SIGNIFICANT CHANGE UP (ref 150–400)
PLATELET # BLD AUTO: 172 K/UL — SIGNIFICANT CHANGE UP (ref 150–400)
POTASSIUM SERPL-MCNC: 4.1 MMOL/L — SIGNIFICANT CHANGE UP (ref 3.5–5.3)
POTASSIUM SERPL-SCNC: 4.1 MMOL/L — SIGNIFICANT CHANGE UP (ref 3.5–5.3)
RBC # BLD: 3.34 M/UL — LOW (ref 4.2–5.8)
RBC # BLD: 3.73 M/UL — LOW (ref 4.2–5.8)
RBC # FLD: 14.6 % — HIGH (ref 10.3–14.5)
RBC # FLD: 14.7 % — HIGH (ref 10.3–14.5)
SODIUM SERPL-SCNC: 143 MMOL/L — SIGNIFICANT CHANGE UP (ref 135–145)
SPECIMEN SOURCE: SIGNIFICANT CHANGE UP
WBC # BLD: 12.47 K/UL — HIGH (ref 3.8–10.5)
WBC # BLD: 14.73 K/UL — HIGH (ref 3.8–10.5)
WBC # FLD AUTO: 12.47 K/UL — HIGH (ref 3.8–10.5)
WBC # FLD AUTO: 14.73 K/UL — HIGH (ref 3.8–10.5)

## 2022-08-21 PROCEDURE — 99233 SBSQ HOSP IP/OBS HIGH 50: CPT

## 2022-08-21 RX ORDER — ACETAMINOPHEN 500 MG
1000 TABLET ORAL ONCE
Refills: 0 | Status: COMPLETED | OUTPATIENT
Start: 2022-08-21 | End: 2022-08-21

## 2022-08-21 RX ADMIN — Medication 50 MILLIGRAM(S): at 12:51

## 2022-08-21 RX ADMIN — RIVAROXABAN 20 MILLIGRAM(S): KIT at 17:41

## 2022-08-21 RX ADMIN — Medication 3 MILLILITER(S): at 09:26

## 2022-08-21 RX ADMIN — Medication 3 MILLILITER(S): at 14:34

## 2022-08-21 RX ADMIN — Medication 60 MILLIGRAM(S): at 09:56

## 2022-08-21 RX ADMIN — Medication 100 MILLIGRAM(S): at 22:59

## 2022-08-21 RX ADMIN — Medication 40 MILLIGRAM(S): at 06:38

## 2022-08-21 RX ADMIN — Medication 5 MILLIGRAM(S): at 09:56

## 2022-08-21 RX ADMIN — CEFTRIAXONE 100 MILLIGRAM(S): 500 INJECTION, POWDER, FOR SOLUTION INTRAMUSCULAR; INTRAVENOUS at 09:56

## 2022-08-21 RX ADMIN — Medication 60 MILLIGRAM(S): at 17:41

## 2022-08-21 RX ADMIN — Medication 3 MILLILITER(S): at 23:15

## 2022-08-21 RX ADMIN — Medication 600 MILLIGRAM(S): at 10:20

## 2022-08-21 RX ADMIN — ATORVASTATIN CALCIUM 20 MILLIGRAM(S): 80 TABLET, FILM COATED ORAL at 22:58

## 2022-08-21 RX ADMIN — AZITHROMYCIN 500 MILLIGRAM(S): 500 TABLET, FILM COATED ORAL at 09:56

## 2022-08-21 RX ADMIN — DONEPEZIL HYDROCHLORIDE 10 MILLIGRAM(S): 10 TABLET, FILM COATED ORAL at 22:58

## 2022-08-21 RX ADMIN — Medication 3 MILLILITER(S): at 02:25

## 2022-08-21 RX ADMIN — Medication 600 MILLIGRAM(S): at 22:58

## 2022-08-21 RX ADMIN — Medication 400 MILLIGRAM(S): at 22:58

## 2022-08-21 RX ADMIN — Medication 60 MILLIGRAM(S): at 23:01

## 2022-08-21 RX ADMIN — MIRTAZAPINE 15 MILLIGRAM(S): 45 TABLET, ORALLY DISINTEGRATING ORAL at 22:59

## 2022-08-22 LAB
ALBUMIN SERPL ELPH-MCNC: 2.4 G/DL — LOW (ref 3.3–5.2)
ALP SERPL-CCNC: 58 U/L — SIGNIFICANT CHANGE UP (ref 40–120)
ALT FLD-CCNC: 11 U/L — SIGNIFICANT CHANGE UP
ANION GAP SERPL CALC-SCNC: 10 MMOL/L — SIGNIFICANT CHANGE UP (ref 5–17)
ANION GAP SERPL CALC-SCNC: 11 MMOL/L — SIGNIFICANT CHANGE UP (ref 5–17)
AST SERPL-CCNC: 19 U/L — SIGNIFICANT CHANGE UP
BASOPHILS # BLD AUTO: 0 K/UL — SIGNIFICANT CHANGE UP (ref 0–0.2)
BASOPHILS # BLD AUTO: 0.01 K/UL — SIGNIFICANT CHANGE UP (ref 0–0.2)
BASOPHILS NFR BLD AUTO: 0 % — SIGNIFICANT CHANGE UP (ref 0–2)
BASOPHILS NFR BLD AUTO: 0.1 % — SIGNIFICANT CHANGE UP (ref 0–2)
BILIRUB SERPL-MCNC: 0.5 MG/DL — SIGNIFICANT CHANGE UP (ref 0.4–2)
BUN SERPL-MCNC: 29.6 MG/DL — HIGH (ref 8–20)
BUN SERPL-MCNC: 30.2 MG/DL — HIGH (ref 8–20)
CALCIUM SERPL-MCNC: 7.7 MG/DL — LOW (ref 8.4–10.5)
CALCIUM SERPL-MCNC: 7.9 MG/DL — LOW (ref 8.4–10.5)
CHLORIDE SERPL-SCNC: 103 MMOL/L — SIGNIFICANT CHANGE UP (ref 98–107)
CHLORIDE SERPL-SCNC: 103 MMOL/L — SIGNIFICANT CHANGE UP (ref 98–107)
CO2 SERPL-SCNC: 27 MMOL/L — SIGNIFICANT CHANGE UP (ref 22–29)
CO2 SERPL-SCNC: 29 MMOL/L — SIGNIFICANT CHANGE UP (ref 22–29)
CREAT SERPL-MCNC: 1.22 MG/DL — SIGNIFICANT CHANGE UP (ref 0.5–1.3)
CREAT SERPL-MCNC: 1.28 MG/DL — SIGNIFICANT CHANGE UP (ref 0.5–1.3)
EGFR: 54 ML/MIN/1.73M2 — LOW
EGFR: 57 ML/MIN/1.73M2 — LOW
EOSINOPHIL # BLD AUTO: 0 K/UL — SIGNIFICANT CHANGE UP (ref 0–0.5)
EOSINOPHIL # BLD AUTO: 0.01 K/UL — SIGNIFICANT CHANGE UP (ref 0–0.5)
EOSINOPHIL NFR BLD AUTO: 0 % — SIGNIFICANT CHANGE UP (ref 0–6)
EOSINOPHIL NFR BLD AUTO: 0.1 % — SIGNIFICANT CHANGE UP (ref 0–6)
GLUCOSE SERPL-MCNC: 109 MG/DL — HIGH (ref 70–99)
GLUCOSE SERPL-MCNC: 118 MG/DL — HIGH (ref 70–99)
HCT VFR BLD CALC: 36.3 % — LOW (ref 39–50)
HGB BLD-MCNC: 11.4 G/DL — LOW (ref 13–17)
IMM GRANULOCYTES NFR BLD AUTO: 0.4 % — SIGNIFICANT CHANGE UP (ref 0–1.5)
LACTATE SERPL-SCNC: 1.9 MMOL/L — SIGNIFICANT CHANGE UP (ref 0.5–2)
LYMPHOCYTES # BLD AUTO: 0.21 K/UL — LOW (ref 1–3.3)
LYMPHOCYTES # BLD AUTO: 0.4 K/UL — LOW (ref 1–3.3)
LYMPHOCYTES # BLD AUTO: 1.7 % — LOW (ref 13–44)
LYMPHOCYTES # BLD AUTO: 3.4 % — LOW (ref 13–44)
MAGNESIUM SERPL-MCNC: 2 MG/DL — SIGNIFICANT CHANGE UP (ref 1.6–2.6)
MANUAL SMEAR VERIFICATION: SIGNIFICANT CHANGE UP
MCHC RBC-ENTMCNC: 30 PG — SIGNIFICANT CHANGE UP (ref 27–34)
MCHC RBC-ENTMCNC: 31.4 GM/DL — LOW (ref 32–36)
MCV RBC AUTO: 95.5 FL — SIGNIFICANT CHANGE UP (ref 80–100)
MONOCYTES # BLD AUTO: 0.44 K/UL — SIGNIFICANT CHANGE UP (ref 0–0.9)
MONOCYTES # BLD AUTO: 0.67 K/UL — SIGNIFICANT CHANGE UP (ref 0–0.9)
MONOCYTES NFR BLD AUTO: 3.5 % — SIGNIFICANT CHANGE UP (ref 2–14)
MONOCYTES NFR BLD AUTO: 5.7 % — SIGNIFICANT CHANGE UP (ref 2–14)
NEUTROPHILS # BLD AUTO: 10.69 K/UL — HIGH (ref 1.8–7.4)
NEUTROPHILS # BLD AUTO: 11.82 K/UL — HIGH (ref 1.8–7.4)
NEUTROPHILS NFR BLD AUTO: 90.3 % — HIGH (ref 43–77)
NEUTROPHILS NFR BLD AUTO: 94.8 % — HIGH (ref 43–77)
PHOSPHATE SERPL-MCNC: 2.4 MG/DL — SIGNIFICANT CHANGE UP (ref 2.4–4.7)
PLAT MORPH BLD: NORMAL — SIGNIFICANT CHANGE UP
PLATELET # BLD AUTO: 183 K/UL — SIGNIFICANT CHANGE UP (ref 150–400)
POIKILOCYTOSIS BLD QL AUTO: SIGNIFICANT CHANGE UP
POLYCHROMASIA BLD QL SMEAR: SIGNIFICANT CHANGE UP
POTASSIUM SERPL-MCNC: 3.4 MMOL/L — LOW (ref 3.5–5.3)
POTASSIUM SERPL-MCNC: 3.6 MMOL/L — SIGNIFICANT CHANGE UP (ref 3.5–5.3)
POTASSIUM SERPL-SCNC: 3.4 MMOL/L — LOW (ref 3.5–5.3)
POTASSIUM SERPL-SCNC: 3.6 MMOL/L — SIGNIFICANT CHANGE UP (ref 3.5–5.3)
PROCALCITONIN SERPL-MCNC: 12.82 NG/ML — HIGH (ref 0.02–0.1)
PROT SERPL-MCNC: 5.1 G/DL — LOW (ref 6.6–8.7)
RBC # BLD: 3.8 M/UL — LOW (ref 4.2–5.8)
RBC # FLD: 14.8 % — HIGH (ref 10.3–14.5)
RBC BLD AUTO: ABNORMAL
SODIUM SERPL-SCNC: 141 MMOL/L — SIGNIFICANT CHANGE UP (ref 135–145)
SODIUM SERPL-SCNC: 142 MMOL/L — SIGNIFICANT CHANGE UP (ref 135–145)
WBC # BLD: 11.83 K/UL — HIGH (ref 3.8–10.5)
WBC # FLD AUTO: 11.83 K/UL — HIGH (ref 3.8–10.5)

## 2022-08-22 PROCEDURE — 93306 TTE W/DOPPLER COMPLETE: CPT | Mod: 26

## 2022-08-22 PROCEDURE — 99233 SBSQ HOSP IP/OBS HIGH 50: CPT

## 2022-08-22 RX ORDER — METOPROLOL TARTRATE 50 MG
50 TABLET ORAL
Refills: 0 | Status: DISCONTINUED | OUTPATIENT
Start: 2022-08-22 | End: 2022-08-21

## 2022-08-22 RX ORDER — POTASSIUM CHLORIDE 20 MEQ
40 PACKET (EA) ORAL ONCE
Refills: 0 | Status: COMPLETED | OUTPATIENT
Start: 2022-08-22 | End: 2022-08-22

## 2022-08-22 RX ADMIN — Medication 100 MILLIGRAM(S): at 21:24

## 2022-08-22 RX ADMIN — AZITHROMYCIN 500 MILLIGRAM(S): 500 TABLET, FILM COATED ORAL at 11:58

## 2022-08-22 RX ADMIN — Medication 3 MILLILITER(S): at 23:55

## 2022-08-22 RX ADMIN — Medication 60 MILLIGRAM(S): at 23:31

## 2022-08-22 RX ADMIN — RIVAROXABAN 20 MILLIGRAM(S): KIT at 17:41

## 2022-08-22 RX ADMIN — Medication 600 MILLIGRAM(S): at 10:01

## 2022-08-22 RX ADMIN — Medication 60 MILLIGRAM(S): at 17:41

## 2022-08-22 RX ADMIN — Medication 60 MILLIGRAM(S): at 11:56

## 2022-08-22 RX ADMIN — DONEPEZIL HYDROCHLORIDE 10 MILLIGRAM(S): 10 TABLET, FILM COATED ORAL at 21:24

## 2022-08-22 RX ADMIN — Medication 50 MILLIGRAM(S): at 05:00

## 2022-08-22 RX ADMIN — Medication 5 MILLIGRAM(S): at 09:48

## 2022-08-22 RX ADMIN — Medication 50 MILLIGRAM(S): at 17:50

## 2022-08-22 RX ADMIN — Medication 600 MILLIGRAM(S): at 21:24

## 2022-08-22 RX ADMIN — Medication 3 MILLILITER(S): at 09:40

## 2022-08-22 RX ADMIN — ATORVASTATIN CALCIUM 20 MILLIGRAM(S): 80 TABLET, FILM COATED ORAL at 21:24

## 2022-08-22 RX ADMIN — MIRTAZAPINE 15 MILLIGRAM(S): 45 TABLET, ORALLY DISINTEGRATING ORAL at 21:25

## 2022-08-22 RX ADMIN — Medication 60 MILLIGRAM(S): at 05:00

## 2022-08-22 RX ADMIN — CEFTRIAXONE 100 MILLIGRAM(S): 500 INJECTION, POWDER, FOR SOLUTION INTRAMUSCULAR; INTRAVENOUS at 10:00

## 2022-08-22 RX ADMIN — Medication 5 MILLIGRAM(S): at 05:00

## 2022-08-22 RX ADMIN — Medication 3 MILLILITER(S): at 15:54

## 2022-08-22 RX ADMIN — Medication 40 MILLIEQUIVALENT(S): at 00:36

## 2022-08-22 RX ADMIN — Medication 40 MILLIGRAM(S): at 05:00

## 2022-08-22 NOTE — PATIENT PROFILE ADULT - FALL HARM RISK - HARM RISK INTERVENTIONS

## 2022-08-23 LAB
ANION GAP SERPL CALC-SCNC: 11 MMOL/L — SIGNIFICANT CHANGE UP (ref 5–17)
BASOPHILS # BLD AUTO: 0.02 K/UL — SIGNIFICANT CHANGE UP (ref 0–0.2)
BASOPHILS NFR BLD AUTO: 0.2 % — SIGNIFICANT CHANGE UP (ref 0–2)
BUN SERPL-MCNC: 32.6 MG/DL — HIGH (ref 8–20)
CALCIUM SERPL-MCNC: 7.9 MG/DL — LOW (ref 8.4–10.5)
CHLORIDE SERPL-SCNC: 106 MMOL/L — SIGNIFICANT CHANGE UP (ref 98–107)
CO2 SERPL-SCNC: 28 MMOL/L — SIGNIFICANT CHANGE UP (ref 22–29)
CREAT SERPL-MCNC: 1.11 MG/DL — SIGNIFICANT CHANGE UP (ref 0.5–1.3)
EGFR: 63 ML/MIN/1.73M2 — SIGNIFICANT CHANGE UP
EOSINOPHIL # BLD AUTO: 0.02 K/UL — SIGNIFICANT CHANGE UP (ref 0–0.5)
EOSINOPHIL NFR BLD AUTO: 0.2 % — SIGNIFICANT CHANGE UP (ref 0–6)
GLUCOSE SERPL-MCNC: 89 MG/DL — SIGNIFICANT CHANGE UP (ref 70–99)
HCT VFR BLD CALC: 31.1 % — LOW (ref 39–50)
HGB BLD-MCNC: 9.7 G/DL — LOW (ref 13–17)
IMM GRANULOCYTES NFR BLD AUTO: 0.5 % — SIGNIFICANT CHANGE UP (ref 0–1.5)
LYMPHOCYTES # BLD AUTO: 0.48 K/UL — LOW (ref 1–3.3)
LYMPHOCYTES # BLD AUTO: 5.8 % — LOW (ref 13–44)
MAGNESIUM SERPL-MCNC: 2 MG/DL — SIGNIFICANT CHANGE UP (ref 1.6–2.6)
MCHC RBC-ENTMCNC: 29.6 PG — SIGNIFICANT CHANGE UP (ref 27–34)
MCHC RBC-ENTMCNC: 31.2 GM/DL — LOW (ref 32–36)
MCV RBC AUTO: 94.8 FL — SIGNIFICANT CHANGE UP (ref 80–100)
MONOCYTES # BLD AUTO: 0.67 K/UL — SIGNIFICANT CHANGE UP (ref 0–0.9)
MONOCYTES NFR BLD AUTO: 8.1 % — SIGNIFICANT CHANGE UP (ref 2–14)
NEUTROPHILS # BLD AUTO: 7.02 K/UL — SIGNIFICANT CHANGE UP (ref 1.8–7.4)
NEUTROPHILS NFR BLD AUTO: 85.2 % — HIGH (ref 43–77)
PHOSPHATE SERPL-MCNC: 2 MG/DL — LOW (ref 2.4–4.7)
PLATELET # BLD AUTO: 156 K/UL — SIGNIFICANT CHANGE UP (ref 150–400)
POTASSIUM SERPL-MCNC: 3.7 MMOL/L — SIGNIFICANT CHANGE UP (ref 3.5–5.3)
POTASSIUM SERPL-SCNC: 3.7 MMOL/L — SIGNIFICANT CHANGE UP (ref 3.5–5.3)
RBC # BLD: 3.28 M/UL — LOW (ref 4.2–5.8)
RBC # FLD: 14.6 % — HIGH (ref 10.3–14.5)
SODIUM SERPL-SCNC: 145 MMOL/L — SIGNIFICANT CHANGE UP (ref 135–145)
WBC # BLD: 8.25 K/UL — SIGNIFICANT CHANGE UP (ref 3.8–10.5)
WBC # FLD AUTO: 8.25 K/UL — SIGNIFICANT CHANGE UP (ref 3.8–10.5)

## 2022-08-23 PROCEDURE — 99233 SBSQ HOSP IP/OBS HIGH 50: CPT

## 2022-08-23 RX ORDER — POTASSIUM PHOSPHATE, MONOBASIC POTASSIUM PHOSPHATE, DIBASIC 236; 224 MG/ML; MG/ML
15 INJECTION, SOLUTION INTRAVENOUS ONCE
Refills: 0 | Status: COMPLETED | OUTPATIENT
Start: 2022-08-23 | End: 2022-08-23

## 2022-08-23 RX ADMIN — Medication 600 MILLIGRAM(S): at 11:54

## 2022-08-23 RX ADMIN — DONEPEZIL HYDROCHLORIDE 10 MILLIGRAM(S): 10 TABLET, FILM COATED ORAL at 22:11

## 2022-08-23 RX ADMIN — POTASSIUM PHOSPHATE, MONOBASIC POTASSIUM PHOSPHATE, DIBASIC 62.5 MILLIMOLE(S): 236; 224 INJECTION, SOLUTION INTRAVENOUS at 08:35

## 2022-08-23 RX ADMIN — Medication 100 MILLIGRAM(S): at 22:11

## 2022-08-23 RX ADMIN — Medication 60 MILLIGRAM(S): at 11:54

## 2022-08-23 RX ADMIN — Medication 3 MILLILITER(S): at 09:08

## 2022-08-23 RX ADMIN — Medication 50 MILLIGRAM(S): at 06:41

## 2022-08-23 RX ADMIN — MIRTAZAPINE 15 MILLIGRAM(S): 45 TABLET, ORALLY DISINTEGRATING ORAL at 22:11

## 2022-08-23 RX ADMIN — AZITHROMYCIN 500 MILLIGRAM(S): 500 TABLET, FILM COATED ORAL at 11:53

## 2022-08-23 RX ADMIN — Medication 60 MILLIGRAM(S): at 06:39

## 2022-08-23 RX ADMIN — RIVAROXABAN 20 MILLIGRAM(S): KIT at 17:19

## 2022-08-23 RX ADMIN — ATORVASTATIN CALCIUM 20 MILLIGRAM(S): 80 TABLET, FILM COATED ORAL at 22:11

## 2022-08-23 RX ADMIN — Medication 600 MILLIGRAM(S): at 22:11

## 2022-08-23 RX ADMIN — Medication 50 MILLIGRAM(S): at 17:18

## 2022-08-23 RX ADMIN — CEFTRIAXONE 100 MILLIGRAM(S): 500 INJECTION, POWDER, FOR SOLUTION INTRAMUSCULAR; INTRAVENOUS at 12:41

## 2022-08-23 RX ADMIN — Medication 3 MILLILITER(S): at 20:57

## 2022-08-23 RX ADMIN — Medication 3 MILLILITER(S): at 15:09

## 2022-08-23 RX ADMIN — Medication 5 MILLIGRAM(S): at 06:39

## 2022-08-23 RX ADMIN — Medication 60 MILLIGRAM(S): at 17:18

## 2022-08-23 NOTE — PHYSICAL THERAPY INITIAL EVALUATION ADULT - PERTINENT HX OF CURRENT PROBLEM, REHAB EVAL
Pt with PMH dementia, prostate CA, [place] presents from [facility] post fall, tachycardic to 150bpm, hypoxic with spO2 @ high 80's, and peripheral edema and sepsis on admission, CHF exacerbation and viral PNA.

## 2022-08-23 NOTE — SWALLOW BEDSIDE ASSESSMENT ADULT - PHARYNGEAL PHASE
Delayed cough post oral intake Within functional limits increased WOB also noted post intake/Cough post oral intake

## 2022-08-23 NOTE — SWALLOW BEDSIDE ASSESSMENT ADULT - COMMENTS
wet upper airway breath sounds consistent with baseline As per MD note: "89yr old M w/a PMH of Dementia, HTN, HLD, prostate CA, RA, hard of hearing, RUE DVT, Cellulitis presents from [facility] after a fall noted to have tachycardia, increased wob with hypoxia, edema, elevated prbnp, + enterovirus admitted to stepdown unit for AHRF in setting of CHF exacerbation and Viral PNA."

## 2022-08-23 NOTE — SWALLOW BEDSIDE ASSESSMENT ADULT - SWALLOW EVAL: DIAGNOSIS
Oral dysphagia suspected with intake of thin fluids, otherwise WFL. Pharyngeal stage of swallow clinically unremarkable & consistent with baseline for assessed solids & moderately thick fluids. Pharyngeal dysphagia suspected for thin & mildly thick fluids with overt s/s aspiration observed post swallow

## 2022-08-23 NOTE — SWALLOW BEDSIDE ASSESSMENT ADULT - SLP GENERAL OBSERVATIONS
Pt received & seen seated upright in bed, awake/alert, on 8L 02 NC (sats: 97-99%), baseline wet upper airway breath sounds & cough), reduced cognition, decreased hearing acuity, 0/10 pain pre/post

## 2022-08-23 NOTE — PHYSICAL THERAPY INITIAL EVALUATION ADULT - ADDITIONAL COMMENTS
Pt Poor historian, as per CCC, pt lives at [facility] with assist with ADL's and use of   RW at baseline.

## 2022-08-23 NOTE — PHYSICAL THERAPY INITIAL EVALUATION ADULT - IMPAIRMENTS FOUND, PT EVAL
arousal, attention, and cognition/cognitive impairment/ergonomics and body mechanics/gait, locomotion, and balance/gross motor/joint integrity and mobility/muscle strength/poor safety awareness/ROM

## 2022-08-24 LAB
ANION GAP SERPL CALC-SCNC: 10 MMOL/L — SIGNIFICANT CHANGE UP (ref 5–17)
BASOPHILS # BLD AUTO: 0.02 K/UL — SIGNIFICANT CHANGE UP (ref 0–0.2)
BASOPHILS NFR BLD AUTO: 0.3 % — SIGNIFICANT CHANGE UP (ref 0–2)
BUN SERPL-MCNC: 29.8 MG/DL — HIGH (ref 8–20)
CALCIUM SERPL-MCNC: 7.6 MG/DL — LOW (ref 8.4–10.5)
CHLORIDE SERPL-SCNC: 108 MMOL/L — HIGH (ref 98–107)
CO2 SERPL-SCNC: 28 MMOL/L — SIGNIFICANT CHANGE UP (ref 22–29)
CREAT SERPL-MCNC: 0.95 MG/DL — SIGNIFICANT CHANGE UP (ref 0.5–1.3)
EGFR: 77 ML/MIN/1.73M2 — SIGNIFICANT CHANGE UP
EOSINOPHIL # BLD AUTO: 0.29 K/UL — SIGNIFICANT CHANGE UP (ref 0–0.5)
EOSINOPHIL NFR BLD AUTO: 4.6 % — SIGNIFICANT CHANGE UP (ref 0–6)
GLUCOSE SERPL-MCNC: 91 MG/DL — SIGNIFICANT CHANGE UP (ref 70–99)
HCT VFR BLD CALC: 30.7 % — LOW (ref 39–50)
HGB BLD-MCNC: 9.3 G/DL — LOW (ref 13–17)
IMM GRANULOCYTES NFR BLD AUTO: 0.5 % — SIGNIFICANT CHANGE UP (ref 0–1.5)
LYMPHOCYTES # BLD AUTO: 0.64 K/UL — LOW (ref 1–3.3)
LYMPHOCYTES # BLD AUTO: 10.2 % — LOW (ref 13–44)
MAGNESIUM SERPL-MCNC: 2.2 MG/DL — SIGNIFICANT CHANGE UP (ref 1.6–2.6)
MCHC RBC-ENTMCNC: 28.9 PG — SIGNIFICANT CHANGE UP (ref 27–34)
MCHC RBC-ENTMCNC: 30.3 GM/DL — LOW (ref 32–36)
MCV RBC AUTO: 95.3 FL — SIGNIFICANT CHANGE UP (ref 80–100)
MONOCYTES # BLD AUTO: 0.68 K/UL — SIGNIFICANT CHANGE UP (ref 0–0.9)
MONOCYTES NFR BLD AUTO: 10.8 % — SIGNIFICANT CHANGE UP (ref 2–14)
NEUTROPHILS # BLD AUTO: 4.64 K/UL — SIGNIFICANT CHANGE UP (ref 1.8–7.4)
NEUTROPHILS NFR BLD AUTO: 73.6 % — SIGNIFICANT CHANGE UP (ref 43–77)
PHOSPHATE SERPL-MCNC: 2 MG/DL — LOW (ref 2.4–4.7)
PLATELET # BLD AUTO: 157 K/UL — SIGNIFICANT CHANGE UP (ref 150–400)
POTASSIUM SERPL-MCNC: 3.3 MMOL/L — LOW (ref 3.5–5.3)
POTASSIUM SERPL-SCNC: 3.3 MMOL/L — LOW (ref 3.5–5.3)
RBC # BLD: 3.22 M/UL — LOW (ref 4.2–5.8)
RBC # FLD: 14.8 % — HIGH (ref 10.3–14.5)
SODIUM SERPL-SCNC: 145 MMOL/L — SIGNIFICANT CHANGE UP (ref 135–145)
WBC # BLD: 6.3 K/UL — SIGNIFICANT CHANGE UP (ref 3.8–10.5)
WBC # FLD AUTO: 6.3 K/UL — SIGNIFICANT CHANGE UP (ref 3.8–10.5)

## 2022-08-24 PROCEDURE — 99233 SBSQ HOSP IP/OBS HIGH 50: CPT

## 2022-08-24 RX ORDER — POTASSIUM PHOSPHATE, MONOBASIC POTASSIUM PHOSPHATE, DIBASIC 236; 224 MG/ML; MG/ML
15 INJECTION, SOLUTION INTRAVENOUS ONCE
Refills: 0 | Status: COMPLETED | OUTPATIENT
Start: 2022-08-24 | End: 2022-08-24

## 2022-08-24 RX ORDER — POTASSIUM CHLORIDE 20 MEQ
20 PACKET (EA) ORAL ONCE
Refills: 0 | Status: COMPLETED | OUTPATIENT
Start: 2022-08-24 | End: 2022-08-24

## 2022-08-24 RX ADMIN — Medication 20 MILLIEQUIVALENT(S): at 09:44

## 2022-08-24 RX ADMIN — RIVAROXABAN 20 MILLIGRAM(S): KIT at 17:15

## 2022-08-24 RX ADMIN — AZITHROMYCIN 500 MILLIGRAM(S): 500 TABLET, FILM COATED ORAL at 13:54

## 2022-08-24 RX ADMIN — MIRTAZAPINE 15 MILLIGRAM(S): 45 TABLET, ORALLY DISINTEGRATING ORAL at 21:40

## 2022-08-24 RX ADMIN — DONEPEZIL HYDROCHLORIDE 10 MILLIGRAM(S): 10 TABLET, FILM COATED ORAL at 21:39

## 2022-08-24 RX ADMIN — POTASSIUM PHOSPHATE, MONOBASIC POTASSIUM PHOSPHATE, DIBASIC 62.5 MILLIMOLE(S): 236; 224 INJECTION, SOLUTION INTRAVENOUS at 09:45

## 2022-08-24 RX ADMIN — Medication 50 MILLIGRAM(S): at 06:06

## 2022-08-24 RX ADMIN — Medication 5 MILLIGRAM(S): at 06:06

## 2022-08-24 RX ADMIN — Medication 60 MILLIGRAM(S): at 11:59

## 2022-08-24 RX ADMIN — ATORVASTATIN CALCIUM 20 MILLIGRAM(S): 80 TABLET, FILM COATED ORAL at 21:40

## 2022-08-24 RX ADMIN — CEFTRIAXONE 100 MILLIGRAM(S): 500 INJECTION, POWDER, FOR SOLUTION INTRAMUSCULAR; INTRAVENOUS at 09:45

## 2022-08-24 RX ADMIN — Medication 3 MILLILITER(S): at 16:37

## 2022-08-24 RX ADMIN — Medication 600 MILLIGRAM(S): at 21:40

## 2022-08-24 RX ADMIN — Medication 60 MILLIGRAM(S): at 00:15

## 2022-08-24 RX ADMIN — Medication 600 MILLIGRAM(S): at 09:45

## 2022-08-24 RX ADMIN — Medication 60 MILLIGRAM(S): at 06:05

## 2022-08-24 RX ADMIN — Medication 60 MILLIGRAM(S): at 17:15

## 2022-08-24 RX ADMIN — Medication 50 MILLIGRAM(S): at 17:15

## 2022-08-25 LAB
ANION GAP SERPL CALC-SCNC: 10 MMOL/L — SIGNIFICANT CHANGE UP (ref 5–17)
BASOPHILS # BLD AUTO: 0.02 K/UL — SIGNIFICANT CHANGE UP (ref 0–0.2)
BASOPHILS NFR BLD AUTO: 0.2 % — SIGNIFICANT CHANGE UP (ref 0–2)
BUN SERPL-MCNC: 29.1 MG/DL — HIGH (ref 8–20)
CALCIUM SERPL-MCNC: 8 MG/DL — LOW (ref 8.4–10.5)
CHLORIDE SERPL-SCNC: 109 MMOL/L — HIGH (ref 98–107)
CO2 SERPL-SCNC: 27 MMOL/L — SIGNIFICANT CHANGE UP (ref 22–29)
CREAT SERPL-MCNC: 0.87 MG/DL — SIGNIFICANT CHANGE UP (ref 0.5–1.3)
EGFR: 82 ML/MIN/1.73M2 — SIGNIFICANT CHANGE UP
EOSINOPHIL # BLD AUTO: 0.3 K/UL — SIGNIFICANT CHANGE UP (ref 0–0.5)
EOSINOPHIL NFR BLD AUTO: 3.4 % — SIGNIFICANT CHANGE UP (ref 0–6)
GLUCOSE SERPL-MCNC: 85 MG/DL — SIGNIFICANT CHANGE UP (ref 70–99)
HCT VFR BLD CALC: 32.1 % — LOW (ref 39–50)
HGB BLD-MCNC: 9.6 G/DL — LOW (ref 13–17)
IMM GRANULOCYTES NFR BLD AUTO: 0.5 % — SIGNIFICANT CHANGE UP (ref 0–1.5)
LYMPHOCYTES # BLD AUTO: 0.45 K/UL — LOW (ref 1–3.3)
LYMPHOCYTES # BLD AUTO: 5.1 % — LOW (ref 13–44)
MAGNESIUM SERPL-MCNC: 2.2 MG/DL — SIGNIFICANT CHANGE UP (ref 1.8–2.6)
MCHC RBC-ENTMCNC: 29 PG — SIGNIFICANT CHANGE UP (ref 27–34)
MCHC RBC-ENTMCNC: 29.9 GM/DL — LOW (ref 32–36)
MCV RBC AUTO: 97 FL — SIGNIFICANT CHANGE UP (ref 80–100)
MONOCYTES # BLD AUTO: 0.68 K/UL — SIGNIFICANT CHANGE UP (ref 0–0.9)
MONOCYTES NFR BLD AUTO: 7.7 % — SIGNIFICANT CHANGE UP (ref 2–14)
NEUTROPHILS # BLD AUTO: 7.29 K/UL — SIGNIFICANT CHANGE UP (ref 1.8–7.4)
NEUTROPHILS NFR BLD AUTO: 83.1 % — HIGH (ref 43–77)
PHOSPHATE SERPL-MCNC: 2.2 MG/DL — LOW (ref 2.4–4.7)
PLATELET # BLD AUTO: 181 K/UL — SIGNIFICANT CHANGE UP (ref 150–400)
POTASSIUM SERPL-MCNC: 4 MMOL/L — SIGNIFICANT CHANGE UP (ref 3.5–5.3)
POTASSIUM SERPL-SCNC: 4 MMOL/L — SIGNIFICANT CHANGE UP (ref 3.5–5.3)
RBC # BLD: 3.31 M/UL — LOW (ref 4.2–5.8)
RBC # FLD: 14.6 % — HIGH (ref 10.3–14.5)
SODIUM SERPL-SCNC: 146 MMOL/L — HIGH (ref 135–145)
WBC # BLD: 8.78 K/UL — SIGNIFICANT CHANGE UP (ref 3.8–10.5)
WBC # FLD AUTO: 8.78 K/UL — SIGNIFICANT CHANGE UP (ref 3.8–10.5)

## 2022-08-25 PROCEDURE — 99233 SBSQ HOSP IP/OBS HIGH 50: CPT

## 2022-08-25 RX ORDER — SODIUM,POTASSIUM PHOSPHATES 278-250MG
1 POWDER IN PACKET (EA) ORAL ONCE
Refills: 0 | Status: COMPLETED | OUTPATIENT
Start: 2022-08-25 | End: 2022-08-25

## 2022-08-25 RX ADMIN — RIVAROXABAN 20 MILLIGRAM(S): KIT at 17:38

## 2022-08-25 RX ADMIN — Medication 60 MILLIGRAM(S): at 11:43

## 2022-08-25 RX ADMIN — Medication 600 MILLIGRAM(S): at 11:43

## 2022-08-25 RX ADMIN — Medication 3 MILLILITER(S): at 00:01

## 2022-08-25 RX ADMIN — Medication 1 PACKET(S): at 11:42

## 2022-08-25 RX ADMIN — Medication 3 MILLILITER(S): at 15:40

## 2022-08-25 RX ADMIN — Medication 60 MILLIGRAM(S): at 05:25

## 2022-08-25 RX ADMIN — Medication 3 MILLILITER(S): at 09:02

## 2022-08-25 RX ADMIN — DONEPEZIL HYDROCHLORIDE 10 MILLIGRAM(S): 10 TABLET, FILM COATED ORAL at 21:11

## 2022-08-25 RX ADMIN — Medication 50 MILLIGRAM(S): at 17:38

## 2022-08-25 RX ADMIN — Medication 60 MILLIGRAM(S): at 17:38

## 2022-08-25 RX ADMIN — Medication 50 MILLIGRAM(S): at 05:25

## 2022-08-25 RX ADMIN — Medication 5 MILLIGRAM(S): at 05:25

## 2022-08-25 RX ADMIN — Medication 600 MILLIGRAM(S): at 21:11

## 2022-08-25 RX ADMIN — ATORVASTATIN CALCIUM 20 MILLIGRAM(S): 80 TABLET, FILM COATED ORAL at 21:11

## 2022-08-25 RX ADMIN — Medication 60 MILLIGRAM(S): at 00:21

## 2022-08-25 RX ADMIN — Medication 100 MILLIGRAM(S): at 21:11

## 2022-08-25 RX ADMIN — MIRTAZAPINE 15 MILLIGRAM(S): 45 TABLET, ORALLY DISINTEGRATING ORAL at 21:11

## 2022-08-26 LAB
ANION GAP SERPL CALC-SCNC: 11 MMOL/L — SIGNIFICANT CHANGE UP (ref 5–17)
ANION GAP SERPL CALC-SCNC: 9 MMOL/L — SIGNIFICANT CHANGE UP (ref 5–17)
BASOPHILS # BLD AUTO: 0.02 K/UL — SIGNIFICANT CHANGE UP (ref 0–0.2)
BASOPHILS NFR BLD AUTO: 0.2 % — SIGNIFICANT CHANGE UP (ref 0–2)
BUN SERPL-MCNC: 27.7 MG/DL — HIGH (ref 8–20)
BUN SERPL-MCNC: 27.8 MG/DL — HIGH (ref 8–20)
CALCIUM SERPL-MCNC: 7.8 MG/DL — LOW (ref 8.4–10.5)
CALCIUM SERPL-MCNC: 8 MG/DL — LOW (ref 8.4–10.5)
CHLORIDE SERPL-SCNC: 111 MMOL/L — HIGH (ref 98–107)
CHLORIDE SERPL-SCNC: 112 MMOL/L — HIGH (ref 98–107)
CO2 SERPL-SCNC: 24 MMOL/L — SIGNIFICANT CHANGE UP (ref 22–29)
CO2 SERPL-SCNC: 25 MMOL/L — SIGNIFICANT CHANGE UP (ref 22–29)
CREAT SERPL-MCNC: 0.85 MG/DL — SIGNIFICANT CHANGE UP (ref 0.5–1.3)
CREAT SERPL-MCNC: 0.89 MG/DL — SIGNIFICANT CHANGE UP (ref 0.5–1.3)
EGFR: 82 ML/MIN/1.73M2 — SIGNIFICANT CHANGE UP
EGFR: 83 ML/MIN/1.73M2 — SIGNIFICANT CHANGE UP
EOSINOPHIL # BLD AUTO: 0.49 K/UL — SIGNIFICANT CHANGE UP (ref 0–0.5)
EOSINOPHIL NFR BLD AUTO: 5.3 % — SIGNIFICANT CHANGE UP (ref 0–6)
GLUCOSE SERPL-MCNC: 73 MG/DL — SIGNIFICANT CHANGE UP (ref 70–99)
GLUCOSE SERPL-MCNC: 81 MG/DL — SIGNIFICANT CHANGE UP (ref 70–99)
HCT VFR BLD CALC: 32.9 % — LOW (ref 39–50)
HGB BLD-MCNC: 10 G/DL — LOW (ref 13–17)
IMM GRANULOCYTES NFR BLD AUTO: 0.5 % — SIGNIFICANT CHANGE UP (ref 0–1.5)
LYMPHOCYTES # BLD AUTO: 0.58 K/UL — LOW (ref 1–3.3)
LYMPHOCYTES # BLD AUTO: 6.3 % — LOW (ref 13–44)
MAGNESIUM SERPL-MCNC: 2.1 MG/DL — SIGNIFICANT CHANGE UP (ref 1.6–2.6)
MAGNESIUM SERPL-MCNC: 2.1 MG/DL — SIGNIFICANT CHANGE UP (ref 1.8–2.6)
MCHC RBC-ENTMCNC: 29.4 PG — SIGNIFICANT CHANGE UP (ref 27–34)
MCHC RBC-ENTMCNC: 30.4 GM/DL — LOW (ref 32–36)
MCV RBC AUTO: 96.8 FL — SIGNIFICANT CHANGE UP (ref 80–100)
MONOCYTES # BLD AUTO: 0.76 K/UL — SIGNIFICANT CHANGE UP (ref 0–0.9)
MONOCYTES NFR BLD AUTO: 8.2 % — SIGNIFICANT CHANGE UP (ref 2–14)
NEUTROPHILS # BLD AUTO: 7.38 K/UL — SIGNIFICANT CHANGE UP (ref 1.8–7.4)
NEUTROPHILS NFR BLD AUTO: 79.5 % — HIGH (ref 43–77)
PHOSPHATE SERPL-MCNC: 2.1 MG/DL — LOW (ref 2.4–4.7)
PHOSPHATE SERPL-MCNC: 2.1 MG/DL — LOW (ref 2.4–4.7)
PLATELET # BLD AUTO: 176 K/UL — SIGNIFICANT CHANGE UP (ref 150–400)
POTASSIUM SERPL-MCNC: 4.1 MMOL/L — SIGNIFICANT CHANGE UP (ref 3.5–5.3)
POTASSIUM SERPL-MCNC: 4.3 MMOL/L — SIGNIFICANT CHANGE UP (ref 3.5–5.3)
POTASSIUM SERPL-SCNC: 4.1 MMOL/L — SIGNIFICANT CHANGE UP (ref 3.5–5.3)
POTASSIUM SERPL-SCNC: 4.3 MMOL/L — SIGNIFICANT CHANGE UP (ref 3.5–5.3)
RBC # BLD: 3.4 M/UL — LOW (ref 4.2–5.8)
RBC # FLD: 14.7 % — HIGH (ref 10.3–14.5)
SODIUM SERPL-SCNC: 146 MMOL/L — HIGH (ref 135–145)
SODIUM SERPL-SCNC: 146 MMOL/L — HIGH (ref 135–145)
WBC # BLD: 9.28 K/UL — SIGNIFICANT CHANGE UP (ref 3.8–10.5)
WBC # FLD AUTO: 9.28 K/UL — SIGNIFICANT CHANGE UP (ref 3.8–10.5)

## 2022-08-26 PROCEDURE — 99233 SBSQ HOSP IP/OBS HIGH 50: CPT

## 2022-08-26 PROCEDURE — 93010 ELECTROCARDIOGRAM REPORT: CPT

## 2022-08-26 RX ADMIN — ATORVASTATIN CALCIUM 20 MILLIGRAM(S): 80 TABLET, FILM COATED ORAL at 21:58

## 2022-08-26 RX ADMIN — Medication 50 MILLIGRAM(S): at 05:03

## 2022-08-26 RX ADMIN — MIRTAZAPINE 15 MILLIGRAM(S): 45 TABLET, ORALLY DISINTEGRATING ORAL at 21:58

## 2022-08-26 RX ADMIN — Medication 5 MILLIGRAM(S): at 05:03

## 2022-08-26 RX ADMIN — Medication 60 MILLIGRAM(S): at 23:20

## 2022-08-26 RX ADMIN — Medication 3 MILLILITER(S): at 00:36

## 2022-08-26 RX ADMIN — Medication 50 MILLIGRAM(S): at 18:05

## 2022-08-26 RX ADMIN — DONEPEZIL HYDROCHLORIDE 10 MILLIGRAM(S): 10 TABLET, FILM COATED ORAL at 21:58

## 2022-08-26 RX ADMIN — Medication 100 MILLIGRAM(S): at 21:57

## 2022-08-26 RX ADMIN — Medication 3 MILLILITER(S): at 08:25

## 2022-08-26 RX ADMIN — RIVAROXABAN 20 MILLIGRAM(S): KIT at 18:05

## 2022-08-26 RX ADMIN — Medication 5 MILLIGRAM(S): at 18:51

## 2022-08-26 RX ADMIN — Medication 60 MILLIGRAM(S): at 18:05

## 2022-08-26 RX ADMIN — Medication 60 MILLIGRAM(S): at 05:03

## 2022-08-26 RX ADMIN — Medication 60 MILLIGRAM(S): at 00:42

## 2022-08-26 RX ADMIN — Medication 3 MILLILITER(S): at 16:09

## 2022-08-26 RX ADMIN — Medication 60 MILLIGRAM(S): at 11:20

## 2022-08-26 NOTE — DIETITIAN INITIAL EVALUATION ADULT - PERTINENT LABORATORY DATA
08-26    146<H>  |  111<H>  |  27.7<H>  ----------------------------<  73  4.1   |  24.0  |  0.85    Ca    8.0<L>      26 Aug 2022 06:51  Phos  2.1     08-26  Mg     2.1     08-26     Home Suture Removal Text: Patient was provided instructions on removing sutures and will remove their sutures at home.  If they have any questions or difficulties they will call the office.

## 2022-08-26 NOTE — DIETITIAN INITIAL EVALUATION ADULT - ORAL INTAKE PTA/DIET HISTORY
Pt with decreased po intake. Seen by ST now recommending thin liquids. Pt needs assistance with meals. Weights have been 190# 7/14/2022, 199# 8/20/22. current weight is 215# 8/23. Plan is assisted living.

## 2022-08-26 NOTE — DIETITIAN INITIAL EVALUATION ADULT - OTHER INFO
89yr old M w/a PMH of Dementia, HTN, HLD, prostate CA, RA, hard of hearing, RUE DVT, Cellulitis presents from [facility] after a fall noted to have tachycardia, increased wob with hypoxia, edema, elevated prbnp, + enterovirus admitted to stepdown unit for AHRF in setting of CHF exacerbation and Viral PNA.

## 2022-08-26 NOTE — DIETITIAN INITIAL EVALUATION ADULT - ADD RECOMMEND
Ensure BID to optimize po intake and provide an additional 350 kcal, 20g protein per serving   needs assistance with meals  Rec MVI, Vit C Ensure BID to optimize po intake and provide an additional 350 kcal, 20g protein per serving   needs assistance with meals  Rec thin liquids as per ST  Rec MVI, Vit C

## 2022-08-26 NOTE — DIETITIAN NUTRITION RISK NOTIFICATION - ADDITIONAL COMMENTS/DIETITIAN RECOMMENDATIONS
Rec thin liquids as per ST.  Rec MVI, Vit C  Rec Ensure BID to optimize po intake and provide an additional 350 kcal, 20g protein per serving

## 2022-08-26 NOTE — DIETITIAN INITIAL EVALUATION ADULT - PERTINENT MEDS FT
MEDICATIONS  (STANDING):  albuterol/ipratropium for Nebulization 3 milliLiter(s) Nebulizer every 8 hours  atorvastatin 20 milliGRAM(s) Oral at bedtime  diltiazem    Tablet 60 milliGRAM(s) Oral four times a day  donepezil 10 milliGRAM(s) Oral at bedtime  metoprolol tartrate 50 milliGRAM(s) Oral two times a day  mirtazapine 15 milliGRAM(s) Oral at bedtime  predniSONE   Tablet 5 milliGRAM(s) Oral daily  rivaroxaban 20 milliGRAM(s) Oral with dinner  traZODone 100 milliGRAM(s) Oral at bedtime    MEDICATIONS  (PRN):  metoprolol tartrate Injectable 5 milliGRAM(s) IV Push every 6 hours PRN hr > 130

## 2022-08-26 NOTE — DIETITIAN NUTRITION RISK NOTIFICATION - TREATMENT: THE FOLLOWING DIET HAS BEEN RECOMMENDED
Diet, DASH/TLC:   Sodium & Cholesterol Restricted  Moderately Thick Liquids (MODTHICKLIQS) (08-23-22 @ 10:05) [Active]

## 2022-08-26 NOTE — DIETITIAN INITIAL EVALUATION ADULT - NSICDXPASTMEDICALHX_GEN_ALL_CORE_FT
PAST MEDICAL HISTORY:  Dementia     H/O: depression     HLD (hyperlipidemia)     [place] (hard of hearing)     HTN (hypertension)     Prostate cancer

## 2022-08-27 LAB
ANION GAP SERPL CALC-SCNC: 11 MMOL/L — SIGNIFICANT CHANGE UP (ref 5–17)
BASOPHILS # BLD AUTO: 0.02 K/UL — SIGNIFICANT CHANGE UP (ref 0–0.2)
BASOPHILS NFR BLD AUTO: 0.2 % — SIGNIFICANT CHANGE UP (ref 0–2)
BUN SERPL-MCNC: 26 MG/DL — HIGH (ref 8–20)
CALCIUM SERPL-MCNC: 8.1 MG/DL — LOW (ref 8.4–10.5)
CHLORIDE SERPL-SCNC: 108 MMOL/L — HIGH (ref 98–107)
CO2 SERPL-SCNC: 26 MMOL/L — SIGNIFICANT CHANGE UP (ref 22–29)
CREAT SERPL-MCNC: 1.02 MG/DL — SIGNIFICANT CHANGE UP (ref 0.5–1.3)
CULTURE RESULTS: SIGNIFICANT CHANGE UP
CULTURE RESULTS: SIGNIFICANT CHANGE UP
EGFR: 70 ML/MIN/1.73M2 — SIGNIFICANT CHANGE UP
EOSINOPHIL # BLD AUTO: 0.61 K/UL — HIGH (ref 0–0.5)
EOSINOPHIL NFR BLD AUTO: 5.9 % — SIGNIFICANT CHANGE UP (ref 0–6)
GLUCOSE SERPL-MCNC: 86 MG/DL — SIGNIFICANT CHANGE UP (ref 70–99)
HCT VFR BLD CALC: 34.3 % — LOW (ref 39–50)
HGB BLD-MCNC: 10.2 G/DL — LOW (ref 13–17)
IMM GRANULOCYTES NFR BLD AUTO: 0.8 % — SIGNIFICANT CHANGE UP (ref 0–1.5)
LYMPHOCYTES # BLD AUTO: 0.68 K/UL — LOW (ref 1–3.3)
LYMPHOCYTES # BLD AUTO: 6.6 % — LOW (ref 13–44)
MAGNESIUM SERPL-MCNC: 2.1 MG/DL — SIGNIFICANT CHANGE UP (ref 1.6–2.6)
MCHC RBC-ENTMCNC: 28.8 PG — SIGNIFICANT CHANGE UP (ref 27–34)
MCHC RBC-ENTMCNC: 29.7 GM/DL — LOW (ref 32–36)
MCV RBC AUTO: 96.9 FL — SIGNIFICANT CHANGE UP (ref 80–100)
MONOCYTES # BLD AUTO: 0.71 K/UL — SIGNIFICANT CHANGE UP (ref 0–0.9)
MONOCYTES NFR BLD AUTO: 6.9 % — SIGNIFICANT CHANGE UP (ref 2–14)
NEUTROPHILS # BLD AUTO: 8.21 K/UL — HIGH (ref 1.8–7.4)
NEUTROPHILS NFR BLD AUTO: 79.6 % — HIGH (ref 43–77)
PHOSPHATE SERPL-MCNC: 2.4 MG/DL — SIGNIFICANT CHANGE UP (ref 2.4–4.7)
PLATELET # BLD AUTO: 230 K/UL — SIGNIFICANT CHANGE UP (ref 150–400)
POTASSIUM SERPL-MCNC: 4 MMOL/L — SIGNIFICANT CHANGE UP (ref 3.5–5.3)
POTASSIUM SERPL-SCNC: 4 MMOL/L — SIGNIFICANT CHANGE UP (ref 3.5–5.3)
RBC # BLD: 3.54 M/UL — LOW (ref 4.2–5.8)
RBC # FLD: 14.8 % — HIGH (ref 10.3–14.5)
SARS-COV-2 RNA SPEC QL NAA+PROBE: SIGNIFICANT CHANGE UP
SODIUM SERPL-SCNC: 145 MMOL/L — SIGNIFICANT CHANGE UP (ref 135–145)
SPECIMEN SOURCE: SIGNIFICANT CHANGE UP
SPECIMEN SOURCE: SIGNIFICANT CHANGE UP
WBC # BLD: 10.31 K/UL — SIGNIFICANT CHANGE UP (ref 3.8–10.5)
WBC # FLD AUTO: 10.31 K/UL — SIGNIFICANT CHANGE UP (ref 3.8–10.5)

## 2022-08-27 PROCEDURE — 99233 SBSQ HOSP IP/OBS HIGH 50: CPT

## 2022-08-27 RX ADMIN — Medication 3 MILLILITER(S): at 08:57

## 2022-08-27 RX ADMIN — MIRTAZAPINE 15 MILLIGRAM(S): 45 TABLET, ORALLY DISINTEGRATING ORAL at 23:13

## 2022-08-27 RX ADMIN — Medication 60 MILLIGRAM(S): at 12:15

## 2022-08-27 RX ADMIN — Medication 5 MILLIGRAM(S): at 05:44

## 2022-08-27 RX ADMIN — Medication 60 MILLIGRAM(S): at 18:10

## 2022-08-27 RX ADMIN — ATORVASTATIN CALCIUM 20 MILLIGRAM(S): 80 TABLET, FILM COATED ORAL at 23:12

## 2022-08-27 RX ADMIN — Medication 60 MILLIGRAM(S): at 05:44

## 2022-08-27 RX ADMIN — Medication 100 MILLIGRAM(S): at 23:13

## 2022-08-27 RX ADMIN — Medication 50 MILLIGRAM(S): at 05:44

## 2022-08-27 RX ADMIN — Medication 50 MILLIGRAM(S): at 18:11

## 2022-08-27 RX ADMIN — Medication 60 MILLIGRAM(S): at 23:12

## 2022-08-27 RX ADMIN — Medication 3 MILLILITER(S): at 00:20

## 2022-08-27 RX ADMIN — Medication 3 MILLILITER(S): at 15:35

## 2022-08-27 RX ADMIN — RIVAROXABAN 20 MILLIGRAM(S): KIT at 18:11

## 2022-08-28 LAB
ANION GAP SERPL CALC-SCNC: 9 MMOL/L — SIGNIFICANT CHANGE UP (ref 5–17)
BASOPHILS # BLD AUTO: 0.02 K/UL — SIGNIFICANT CHANGE UP (ref 0–0.2)
BASOPHILS NFR BLD AUTO: 0.2 % — SIGNIFICANT CHANGE UP (ref 0–2)
BUN SERPL-MCNC: 24.7 MG/DL — HIGH (ref 8–20)
CALCIUM SERPL-MCNC: 7.9 MG/DL — LOW (ref 8.4–10.5)
CHLORIDE SERPL-SCNC: 111 MMOL/L — HIGH (ref 98–107)
CO2 SERPL-SCNC: 26 MMOL/L — SIGNIFICANT CHANGE UP (ref 22–29)
CREAT SERPL-MCNC: 0.86 MG/DL — SIGNIFICANT CHANGE UP (ref 0.5–1.3)
EGFR: 83 ML/MIN/1.73M2 — SIGNIFICANT CHANGE UP
EOSINOPHIL # BLD AUTO: 0.41 K/UL — SIGNIFICANT CHANGE UP (ref 0–0.5)
EOSINOPHIL NFR BLD AUTO: 5 % — SIGNIFICANT CHANGE UP (ref 0–6)
GLUCOSE SERPL-MCNC: 101 MG/DL — HIGH (ref 70–99)
HCT VFR BLD CALC: 33 % — LOW (ref 39–50)
HGB BLD-MCNC: 9.9 G/DL — LOW (ref 13–17)
IMM GRANULOCYTES NFR BLD AUTO: 0.7 % — SIGNIFICANT CHANGE UP (ref 0–1.5)
LYMPHOCYTES # BLD AUTO: 0.5 K/UL — LOW (ref 1–3.3)
LYMPHOCYTES # BLD AUTO: 6.1 % — LOW (ref 13–44)
MAGNESIUM SERPL-MCNC: 2.2 MG/DL — SIGNIFICANT CHANGE UP (ref 1.6–2.6)
MCHC RBC-ENTMCNC: 28.8 PG — SIGNIFICANT CHANGE UP (ref 27–34)
MCHC RBC-ENTMCNC: 30 GM/DL — LOW (ref 32–36)
MCV RBC AUTO: 95.9 FL — SIGNIFICANT CHANGE UP (ref 80–100)
MONOCYTES # BLD AUTO: 0.63 K/UL — SIGNIFICANT CHANGE UP (ref 0–0.9)
MONOCYTES NFR BLD AUTO: 7.7 % — SIGNIFICANT CHANGE UP (ref 2–14)
NEUTROPHILS # BLD AUTO: 6.52 K/UL — SIGNIFICANT CHANGE UP (ref 1.8–7.4)
NEUTROPHILS NFR BLD AUTO: 80.3 % — HIGH (ref 43–77)
PHOSPHATE SERPL-MCNC: 2 MG/DL — LOW (ref 2.4–4.7)
PLATELET # BLD AUTO: 198 K/UL — SIGNIFICANT CHANGE UP (ref 150–400)
POTASSIUM SERPL-MCNC: 4 MMOL/L — SIGNIFICANT CHANGE UP (ref 3.5–5.3)
POTASSIUM SERPL-SCNC: 4 MMOL/L — SIGNIFICANT CHANGE UP (ref 3.5–5.3)
RBC # BLD: 3.44 M/UL — LOW (ref 4.2–5.8)
RBC # FLD: 14.9 % — HIGH (ref 10.3–14.5)
SODIUM SERPL-SCNC: 146 MMOL/L — HIGH (ref 135–145)
WBC # BLD: 8.14 K/UL — SIGNIFICANT CHANGE UP (ref 3.8–10.5)
WBC # FLD AUTO: 8.14 K/UL — SIGNIFICANT CHANGE UP (ref 3.8–10.5)

## 2022-08-28 PROCEDURE — 99232 SBSQ HOSP IP/OBS MODERATE 35: CPT

## 2022-08-28 RX ADMIN — Medication 3 MILLILITER(S): at 16:12

## 2022-08-28 RX ADMIN — RIVAROXABAN 20 MILLIGRAM(S): KIT at 17:55

## 2022-08-28 RX ADMIN — Medication 5 MILLIGRAM(S): at 05:44

## 2022-08-28 RX ADMIN — Medication 60 MILLIGRAM(S): at 11:17

## 2022-08-28 RX ADMIN — Medication 60 MILLIGRAM(S): at 17:54

## 2022-08-28 RX ADMIN — DONEPEZIL HYDROCHLORIDE 10 MILLIGRAM(S): 10 TABLET, FILM COATED ORAL at 21:07

## 2022-08-28 RX ADMIN — Medication 60 MILLIGRAM(S): at 21:08

## 2022-08-28 RX ADMIN — Medication 60 MILLIGRAM(S): at 05:43

## 2022-08-28 RX ADMIN — Medication 50 MILLIGRAM(S): at 05:43

## 2022-08-28 RX ADMIN — MIRTAZAPINE 15 MILLIGRAM(S): 45 TABLET, ORALLY DISINTEGRATING ORAL at 21:07

## 2022-08-28 RX ADMIN — Medication 3 MILLILITER(S): at 09:08

## 2022-08-28 RX ADMIN — Medication 3 MILLILITER(S): at 00:27

## 2022-08-28 RX ADMIN — Medication 50 MILLIGRAM(S): at 17:55

## 2022-08-28 RX ADMIN — DONEPEZIL HYDROCHLORIDE 10 MILLIGRAM(S): 10 TABLET, FILM COATED ORAL at 02:28

## 2022-08-28 RX ADMIN — Medication 100 MILLIGRAM(S): at 21:07

## 2022-08-28 RX ADMIN — ATORVASTATIN CALCIUM 20 MILLIGRAM(S): 80 TABLET, FILM COATED ORAL at 21:07

## 2022-08-28 RX ADMIN — Medication 3 MILLILITER(S): at 23:00

## 2022-08-29 ENCOUNTER — TRANSCRIPTION ENCOUNTER (OUTPATIENT)
Age: 87
End: 2022-08-29

## 2022-08-29 LAB
ANION GAP SERPL CALC-SCNC: 10 MMOL/L — SIGNIFICANT CHANGE UP (ref 5–17)
BASOPHILS # BLD AUTO: 0.03 K/UL — SIGNIFICANT CHANGE UP (ref 0–0.2)
BASOPHILS NFR BLD AUTO: 0.4 % — SIGNIFICANT CHANGE UP (ref 0–2)
BUN SERPL-MCNC: 23.9 MG/DL — HIGH (ref 8–20)
CALCIUM SERPL-MCNC: 8.2 MG/DL — LOW (ref 8.4–10.5)
CHLORIDE SERPL-SCNC: 109 MMOL/L — HIGH (ref 98–107)
CO2 SERPL-SCNC: 24 MMOL/L — SIGNIFICANT CHANGE UP (ref 22–29)
CREAT SERPL-MCNC: 0.85 MG/DL — SIGNIFICANT CHANGE UP (ref 0.5–1.3)
EGFR: 83 ML/MIN/1.73M2 — SIGNIFICANT CHANGE UP
EOSINOPHIL # BLD AUTO: 0.24 K/UL — SIGNIFICANT CHANGE UP (ref 0–0.5)
EOSINOPHIL NFR BLD AUTO: 2.9 % — SIGNIFICANT CHANGE UP (ref 0–6)
GLUCOSE SERPL-MCNC: 87 MG/DL — SIGNIFICANT CHANGE UP (ref 70–99)
HCT VFR BLD CALC: 36.1 % — LOW (ref 39–50)
HGB BLD-MCNC: 10.7 G/DL — LOW (ref 13–17)
IMM GRANULOCYTES NFR BLD AUTO: 0.6 % — SIGNIFICANT CHANGE UP (ref 0–1.5)
LYMPHOCYTES # BLD AUTO: 0.55 K/UL — LOW (ref 1–3.3)
LYMPHOCYTES # BLD AUTO: 6.7 % — LOW (ref 13–44)
MAGNESIUM SERPL-MCNC: 2.1 MG/DL — SIGNIFICANT CHANGE UP (ref 1.6–2.6)
MCHC RBC-ENTMCNC: 29.1 PG — SIGNIFICANT CHANGE UP (ref 27–34)
MCHC RBC-ENTMCNC: 29.6 GM/DL — LOW (ref 32–36)
MCV RBC AUTO: 98.1 FL — SIGNIFICANT CHANGE UP (ref 80–100)
MONOCYTES # BLD AUTO: 0.61 K/UL — SIGNIFICANT CHANGE UP (ref 0–0.9)
MONOCYTES NFR BLD AUTO: 7.4 % — SIGNIFICANT CHANGE UP (ref 2–14)
NEUTROPHILS # BLD AUTO: 6.71 K/UL — SIGNIFICANT CHANGE UP (ref 1.8–7.4)
NEUTROPHILS NFR BLD AUTO: 82 % — HIGH (ref 43–77)
PHOSPHATE SERPL-MCNC: 2.2 MG/DL — LOW (ref 2.4–4.7)
PLATELET # BLD AUTO: 209 K/UL — SIGNIFICANT CHANGE UP (ref 150–400)
POTASSIUM SERPL-MCNC: 4.3 MMOL/L — SIGNIFICANT CHANGE UP (ref 3.5–5.3)
POTASSIUM SERPL-SCNC: 4.3 MMOL/L — SIGNIFICANT CHANGE UP (ref 3.5–5.3)
RBC # BLD: 3.68 M/UL — LOW (ref 4.2–5.8)
RBC # FLD: 15.1 % — HIGH (ref 10.3–14.5)
SODIUM SERPL-SCNC: 143 MMOL/L — SIGNIFICANT CHANGE UP (ref 135–145)
WBC # BLD: 8.19 K/UL — SIGNIFICANT CHANGE UP (ref 3.8–10.5)
WBC # FLD AUTO: 8.19 K/UL — SIGNIFICANT CHANGE UP (ref 3.8–10.5)

## 2022-08-29 PROCEDURE — 99233 SBSQ HOSP IP/OBS HIGH 50: CPT

## 2022-08-29 RX ORDER — SODIUM,POTASSIUM PHOSPHATES 278-250MG
1 POWDER IN PACKET (EA) ORAL ONCE
Refills: 0 | Status: COMPLETED | OUTPATIENT
Start: 2022-08-29 | End: 2022-08-29

## 2022-08-29 RX ADMIN — RIVAROXABAN 20 MILLIGRAM(S): KIT at 18:46

## 2022-08-29 RX ADMIN — ATORVASTATIN CALCIUM 20 MILLIGRAM(S): 80 TABLET, FILM COATED ORAL at 21:24

## 2022-08-29 RX ADMIN — Medication 60 MILLIGRAM(S): at 18:46

## 2022-08-29 RX ADMIN — Medication 5 MILLIGRAM(S): at 18:09

## 2022-08-29 RX ADMIN — Medication 3 MILLILITER(S): at 23:04

## 2022-08-29 RX ADMIN — DONEPEZIL HYDROCHLORIDE 10 MILLIGRAM(S): 10 TABLET, FILM COATED ORAL at 21:24

## 2022-08-29 RX ADMIN — Medication 60 MILLIGRAM(S): at 12:06

## 2022-08-29 RX ADMIN — Medication 50 MILLIGRAM(S): at 18:46

## 2022-08-29 RX ADMIN — Medication 3 MILLILITER(S): at 16:53

## 2022-08-29 RX ADMIN — Medication 3 MILLILITER(S): at 09:59

## 2022-08-29 RX ADMIN — Medication 1 PACKET(S): at 12:06

## 2022-08-29 RX ADMIN — MIRTAZAPINE 15 MILLIGRAM(S): 45 TABLET, ORALLY DISINTEGRATING ORAL at 21:24

## 2022-08-29 RX ADMIN — Medication 5 MILLIGRAM(S): at 06:05

## 2022-08-29 RX ADMIN — Medication 100 MILLIGRAM(S): at 21:24

## 2022-08-29 RX ADMIN — Medication 50 MILLIGRAM(S): at 06:05

## 2022-08-29 RX ADMIN — Medication 60 MILLIGRAM(S): at 06:05

## 2022-08-29 RX ADMIN — Medication 60 MILLIGRAM(S): at 23:14

## 2022-08-29 NOTE — DISCHARGE NOTE NURSING/CASE MANAGEMENT/SOCIAL WORK - PATIENT PORTAL LINK FT
You can access the FollowMyHealth Patient Portal offered by [facility] by registering at the following website: [email or web address]. By joining [facility]’s FollowMyHealth portal, you will also be able to view your health information using other applications (apps) compatible with our system.

## 2022-08-29 NOTE — DISCHARGE NOTE NURSING/CASE MANAGEMENT/SOCIAL WORK - NSDCPEFALRISK_GEN_ALL_CORE
For information on Fall & Injury Prevention, visit: [email or web address] OR  [email or web address] OR  https://www.cdc.gov/steadi/patient.html

## 2022-08-29 NOTE — DISCHARGE NOTE NURSING/CASE MANAGEMENT/SOCIAL WORK - NSDCFUADDAPPT_GEN_ALL_CORE_FT
Pulmonary follow up Appt. scheduled with Dr. Maier   On 09/14 at 10:30.   If you are unable to attend your pre-scheduled appointment,   please contact the office directly at [phone] to reschedule.       Pulmonary follow up Appt. scheduled with Dr. Maier   On 09/14 at 10:30.   If you are unable to attend your pre-scheduled appointment,   please contact the office directly at [phone] to reschedule.   PHARMACY:  [email or web address]  THE PT. DOES NOT HAVE ANY DIFFICULTY IN OBTAINING  OR TAKING HIS MEDICATIONS  DECLINED VIVO MEDS TO BED    PULMONARY FOLLOW UP APPOINTMENT SCHEDULED  WITH DR. NG ON 9/14/2022 AT 10:30 AM  IF YOU ARE UNABLE TO ATTEND YOUR PRESCHEDULED APPOINTMENT,  PLEASE CONTACT THE OFFICE DIRECTLY -[phone] TO RESCHEDULE

## 2022-08-30 LAB
ALBUMIN SERPL ELPH-MCNC: 2.5 G/DL — LOW (ref 3.3–5.2)
ALP SERPL-CCNC: 63 U/L — SIGNIFICANT CHANGE UP (ref 40–120)
ALT FLD-CCNC: 8 U/L — SIGNIFICANT CHANGE UP
ANION GAP SERPL CALC-SCNC: 10 MMOL/L — SIGNIFICANT CHANGE UP (ref 5–17)
AST SERPL-CCNC: 12 U/L — SIGNIFICANT CHANGE UP
BASOPHILS # BLD AUTO: 0.02 K/UL — SIGNIFICANT CHANGE UP (ref 0–0.2)
BASOPHILS NFR BLD AUTO: 0.2 % — SIGNIFICANT CHANGE UP (ref 0–2)
BILIRUB SERPL-MCNC: 0.6 MG/DL — SIGNIFICANT CHANGE UP (ref 0.4–2)
BUN SERPL-MCNC: 25.2 MG/DL — HIGH (ref 8–20)
CALCIUM SERPL-MCNC: 8.2 MG/DL — LOW (ref 8.4–10.5)
CHLORIDE SERPL-SCNC: 108 MMOL/L — HIGH (ref 98–107)
CO2 SERPL-SCNC: 26 MMOL/L — SIGNIFICANT CHANGE UP (ref 22–29)
CREAT SERPL-MCNC: 0.88 MG/DL — SIGNIFICANT CHANGE UP (ref 0.5–1.3)
EGFR: 82 ML/MIN/1.73M2 — SIGNIFICANT CHANGE UP
EOSINOPHIL # BLD AUTO: 0.52 K/UL — HIGH (ref 0–0.5)
EOSINOPHIL NFR BLD AUTO: 4.9 % — SIGNIFICANT CHANGE UP (ref 0–6)
GLUCOSE SERPL-MCNC: 97 MG/DL — SIGNIFICANT CHANGE UP (ref 70–99)
HCT VFR BLD CALC: 35.7 % — LOW (ref 39–50)
HGB BLD-MCNC: 11 G/DL — LOW (ref 13–17)
IMM GRANULOCYTES NFR BLD AUTO: 0.9 % — SIGNIFICANT CHANGE UP (ref 0–1.5)
LYMPHOCYTES # BLD AUTO: 0.51 K/UL — LOW (ref 1–3.3)
LYMPHOCYTES # BLD AUTO: 4.8 % — LOW (ref 13–44)
MAGNESIUM SERPL-MCNC: 2 MG/DL — SIGNIFICANT CHANGE UP (ref 1.8–2.6)
MCHC RBC-ENTMCNC: 29.5 PG — SIGNIFICANT CHANGE UP (ref 27–34)
MCHC RBC-ENTMCNC: 30.8 GM/DL — LOW (ref 32–36)
MCV RBC AUTO: 95.7 FL — SIGNIFICANT CHANGE UP (ref 80–100)
MONOCYTES # BLD AUTO: 0.72 K/UL — SIGNIFICANT CHANGE UP (ref 0–0.9)
MONOCYTES NFR BLD AUTO: 6.8 % — SIGNIFICANT CHANGE UP (ref 2–14)
NEUTROPHILS # BLD AUTO: 8.73 K/UL — HIGH (ref 1.8–7.4)
NEUTROPHILS NFR BLD AUTO: 82.4 % — HIGH (ref 43–77)
PHOSPHATE SERPL-MCNC: 2.3 MG/DL — LOW (ref 2.4–4.7)
PLATELET # BLD AUTO: 259 K/UL — SIGNIFICANT CHANGE UP (ref 150–400)
POTASSIUM SERPL-MCNC: 4.1 MMOL/L — SIGNIFICANT CHANGE UP (ref 3.5–5.3)
POTASSIUM SERPL-SCNC: 4.1 MMOL/L — SIGNIFICANT CHANGE UP (ref 3.5–5.3)
PROT SERPL-MCNC: 5.3 G/DL — LOW (ref 6.6–8.7)
RBC # BLD: 3.73 M/UL — LOW (ref 4.2–5.8)
RBC # FLD: 15.2 % — HIGH (ref 10.3–14.5)
SODIUM SERPL-SCNC: 144 MMOL/L — SIGNIFICANT CHANGE UP (ref 135–145)
WBC # BLD: 10.6 K/UL — HIGH (ref 3.8–10.5)
WBC # FLD AUTO: 10.6 K/UL — HIGH (ref 3.8–10.5)

## 2022-08-30 PROCEDURE — 99232 SBSQ HOSP IP/OBS MODERATE 35: CPT

## 2022-08-30 RX ADMIN — Medication 60 MILLIGRAM(S): at 11:16

## 2022-08-30 RX ADMIN — RIVAROXABAN 20 MILLIGRAM(S): KIT at 17:21

## 2022-08-30 RX ADMIN — Medication 60 MILLIGRAM(S): at 23:16

## 2022-08-30 RX ADMIN — Medication 5 MILLIGRAM(S): at 05:09

## 2022-08-30 RX ADMIN — MIRTAZAPINE 15 MILLIGRAM(S): 45 TABLET, ORALLY DISINTEGRATING ORAL at 21:19

## 2022-08-30 RX ADMIN — DONEPEZIL HYDROCHLORIDE 10 MILLIGRAM(S): 10 TABLET, FILM COATED ORAL at 21:19

## 2022-08-30 RX ADMIN — ATORVASTATIN CALCIUM 20 MILLIGRAM(S): 80 TABLET, FILM COATED ORAL at 21:20

## 2022-08-30 RX ADMIN — Medication 3 MILLILITER(S): at 08:55

## 2022-08-30 RX ADMIN — Medication 60 MILLIGRAM(S): at 17:20

## 2022-08-30 RX ADMIN — Medication 50 MILLIGRAM(S): at 05:09

## 2022-08-30 RX ADMIN — Medication 60 MILLIGRAM(S): at 05:09

## 2022-08-30 RX ADMIN — Medication 3 MILLILITER(S): at 15:22

## 2022-08-30 RX ADMIN — Medication 3 MILLILITER(S): at 21:36

## 2022-08-30 RX ADMIN — Medication 100 MILLIGRAM(S): at 21:19

## 2022-08-30 RX ADMIN — Medication 50 MILLIGRAM(S): at 17:21

## 2022-08-30 NOTE — CHART NOTE - NSCHARTNOTEFT_GEN_A_CORE
Consult called on patient for CHF.  Patient follows with [facility]. ([place])  Dr. Little aware, will call their team.
Source: Patient [ ]  Family [ ]   other [x ]EMR    Current Diet: Dash, mod thick liquids    PO intake:  < 50% [x ]   50-75%  [ ]   %  [ ]  other :    Source for PO intake [ ] Patient [ ] family [ x] chart [ ] staff [ ] other    Enteral /Parenteral Nutrition:     Current Weight: 215.3# 8/23  1+ left and right ankle    % Weight Change     Pertinent Medications: MEDICATIONS  (STANDING):  albuterol/ipratropium for Nebulization 3 milliLiter(s) Nebulizer every 8 hours  atorvastatin 20 milliGRAM(s) Oral at bedtime  diltiazem    Tablet 60 milliGRAM(s) Oral four times a day  donepezil 10 milliGRAM(s) Oral at bedtime  metoprolol tartrate 50 milliGRAM(s) Oral two times a day  mirtazapine 15 milliGRAM(s) Oral at bedtime  predniSONE   Tablet 5 milliGRAM(s) Oral daily  rivaroxaban 20 milliGRAM(s) Oral with dinner  traZODone 100 milliGRAM(s) Oral at bedtime    MEDICATIONS  (PRN):  metoprolol tartrate Injectable 5 milliGRAM(s) IV Push every 6 hours PRN hr > 130    Pertinent Labs: CBC Full  -  ( 30 Aug 2022 06:12 )  WBC Count : 10.60 K/uL  RBC Count : 3.73 M/uL  Hemoglobin : 11.0 g/dL  Hematocrit : 35.7 %  Platelet Count - Automated : 259 K/uL  Mean Cell Volume : 95.7 fl  Mean Cell Hemoglobin : 29.5 pg  Mean Cell Hemoglobin Concentration : 30.8 gm/dL  Auto Neutrophil # : 8.73 K/uL  Auto Lymphocyte # : 0.51 K/uL  Auto Monocyte # : 0.72 K/uL  Auto Eosinophil # : 0.52 K/uL  Auto Basophil # : 0.02 K/uL  Auto Neutrophil % : 82.4 %  Auto Lymphocyte % : 4.8 %  Auto Monocyte % : 6.8 %  Auto Eosinophil % : 4.9 %  Auto Basophil % : 0.2 %      08-30 Na144 mmol/L Glu 97 mg/dL K+ 4.1 mmol/L Cr  0.88 mg/dL BUN 25.2 mg/dL<H> Phos 2.3 mg/dL<L> Alb 2.5 g/dL<L> PAB n/a           Skin:     Nutrition focused physical exam not conducted - found signs of malnutrition [ ]absent [ ]present    Subcutaneous fat loss: [ ] Orbital fat pads region, [ ]Buccal fat region, [ ]Triceps region,  [ ]Ribs region    Muscle wasting: [ ]Temples region, [ ]Clavicle region, [ ]Shoulder region, [ ]Scapula region, [ ]Interosseous region,  [ ]thigh region, [ ]Calf region    Estimated Needs:   [x ] no change since previous assessment  [ ] recalculated:     Current Nutrition Diagnosis: Malnutrition...  Moderate  related to inadequate energy intake in setting of dementia, resp failure  as evidenced by <75% intake > 1 mo, mild edema. Fecal incontinence 8/29 as per flow sheets.       Recommendations: Ensure BID to optimize po intake and provide an additional 350 kcal, 20g protein per serving   Rec MVI, Vit C    Monitoring and Evaluation:   [x ] PO intake [ x] Tolerance to diet prescription [X] Weights  [X] Follow up per protocol [X] Labs:

## 2022-08-31 ENCOUNTER — TRANSCRIPTION ENCOUNTER (OUTPATIENT)
Age: 87
End: 2022-08-31

## 2022-08-31 VITALS
RESPIRATION RATE: 18 BRPM | OXYGEN SATURATION: 98 % | TEMPERATURE: 98 F | HEART RATE: 93 BPM | SYSTOLIC BLOOD PRESSURE: 130 MMHG | DIASTOLIC BLOOD PRESSURE: 81 MMHG

## 2022-08-31 LAB
HCT VFR BLD CALC: 33.8 % — LOW (ref 39–50)
HGB BLD-MCNC: 10.2 G/DL — LOW (ref 13–17)
MCHC RBC-ENTMCNC: 29.1 PG — SIGNIFICANT CHANGE UP (ref 27–34)
MCHC RBC-ENTMCNC: 30.2 GM/DL — LOW (ref 32–36)
MCV RBC AUTO: 96.3 FL — SIGNIFICANT CHANGE UP (ref 80–100)
PLATELET # BLD AUTO: 231 K/UL — SIGNIFICANT CHANGE UP (ref 150–400)
RBC # BLD: 3.51 M/UL — LOW (ref 4.2–5.8)
RBC # FLD: 15.5 % — HIGH (ref 10.3–14.5)
SARS-COV-2 RNA SPEC QL NAA+PROBE: SIGNIFICANT CHANGE UP
WBC # BLD: 13.93 K/UL — HIGH (ref 3.8–10.5)
WBC # FLD AUTO: 13.93 K/UL — HIGH (ref 3.8–10.5)

## 2022-08-31 PROCEDURE — 85027 COMPLETE CBC AUTOMATED: CPT

## 2022-08-31 PROCEDURE — 85018 HEMOGLOBIN: CPT

## 2022-08-31 PROCEDURE — C8929: CPT

## 2022-08-31 PROCEDURE — 80053 COMPREHEN METABOLIC PANEL: CPT

## 2022-08-31 PROCEDURE — 83605 ASSAY OF LACTIC ACID: CPT

## 2022-08-31 PROCEDURE — 72125 CT NECK SPINE W/O DYE: CPT | Mod: MA

## 2022-08-31 PROCEDURE — 83735 ASSAY OF MAGNESIUM: CPT

## 2022-08-31 PROCEDURE — 83880 ASSAY OF NATRIURETIC PEPTIDE: CPT

## 2022-08-31 PROCEDURE — 93005 ELECTROCARDIOGRAM TRACING: CPT

## 2022-08-31 PROCEDURE — 84132 ASSAY OF SERUM POTASSIUM: CPT

## 2022-08-31 PROCEDURE — 99291 CRITICAL CARE FIRST HOUR: CPT

## 2022-08-31 PROCEDURE — 36600 WITHDRAWAL OF ARTERIAL BLOOD: CPT

## 2022-08-31 PROCEDURE — 96374 THER/PROPH/DIAG INJ IV PUSH: CPT

## 2022-08-31 PROCEDURE — 84295 ASSAY OF SERUM SODIUM: CPT

## 2022-08-31 PROCEDURE — 82803 BLOOD GASES ANY COMBINATION: CPT

## 2022-08-31 PROCEDURE — U0005: CPT

## 2022-08-31 PROCEDURE — 82330 ASSAY OF CALCIUM: CPT

## 2022-08-31 PROCEDURE — 82435 ASSAY OF BLOOD CHLORIDE: CPT

## 2022-08-31 PROCEDURE — 85025 COMPLETE CBC W/AUTO DIFF WBC: CPT

## 2022-08-31 PROCEDURE — 84145 PROCALCITONIN (PCT): CPT

## 2022-08-31 PROCEDURE — 87086 URINE CULTURE/COLONY COUNT: CPT

## 2022-08-31 PROCEDURE — 82947 ASSAY GLUCOSE BLOOD QUANT: CPT

## 2022-08-31 PROCEDURE — 85014 HEMATOCRIT: CPT

## 2022-08-31 PROCEDURE — 36415 COLL VENOUS BLD VENIPUNCTURE: CPT

## 2022-08-31 PROCEDURE — 70450 CT HEAD/BRAIN W/O DYE: CPT | Mod: MA

## 2022-08-31 PROCEDURE — 94640 AIRWAY INHALATION TREATMENT: CPT

## 2022-08-31 PROCEDURE — 84484 ASSAY OF TROPONIN QUANT: CPT

## 2022-08-31 PROCEDURE — 92526 ORAL FUNCTION THERAPY: CPT

## 2022-08-31 PROCEDURE — 81001 URINALYSIS AUTO W/SCOPE: CPT

## 2022-08-31 PROCEDURE — 85610 PROTHROMBIN TIME: CPT

## 2022-08-31 PROCEDURE — 94667 MNPJ CHEST WALL 1ST: CPT

## 2022-08-31 PROCEDURE — 71250 CT THORAX DX C-: CPT | Mod: MA

## 2022-08-31 PROCEDURE — 99239 HOSP IP/OBS DSCHRG MGMT >30: CPT

## 2022-08-31 PROCEDURE — 71045 X-RAY EXAM CHEST 1 VIEW: CPT

## 2022-08-31 PROCEDURE — 85730 THROMBOPLASTIN TIME PARTIAL: CPT

## 2022-08-31 PROCEDURE — U0003: CPT

## 2022-08-31 PROCEDURE — 94660 CPAP INITIATION&MGMT: CPT

## 2022-08-31 PROCEDURE — 87040 BLOOD CULTURE FOR BACTERIA: CPT

## 2022-08-31 PROCEDURE — 96375 TX/PRO/DX INJ NEW DRUG ADDON: CPT

## 2022-08-31 PROCEDURE — 80048 BASIC METABOLIC PNL TOTAL CA: CPT

## 2022-08-31 PROCEDURE — 84100 ASSAY OF PHOSPHORUS: CPT

## 2022-08-31 PROCEDURE — 0225U NFCT DS DNA&RNA 21 SARSCOV2: CPT

## 2022-08-31 RX ORDER — METOPROLOL TARTRATE 50 MG
1 TABLET ORAL
Qty: 0 | Refills: 0 | DISCHARGE
Start: 2022-08-31

## 2022-08-31 RX ORDER — METOPROLOL TARTRATE 50 MG
1 TABLET ORAL
Qty: 60 | Refills: 0
Start: 2022-08-31 | End: 2022-09-29

## 2022-08-31 RX ORDER — METOPROLOL TARTRATE 50 MG
1 TABLET ORAL
Qty: 0 | Refills: 0 | DISCHARGE

## 2022-08-31 RX ORDER — RIVAROXABAN 15 MG-20MG
1 KIT ORAL
Qty: 30 | Refills: 0
Start: 2022-08-31 | End: 2022-09-29

## 2022-08-31 RX ORDER — RIVAROXABAN 15 MG-20MG
1 KIT ORAL
Qty: 0 | Refills: 0 | DISCHARGE
Start: 2022-08-31

## 2022-08-31 RX ADMIN — Medication 60 MILLIGRAM(S): at 11:58

## 2022-08-31 RX ADMIN — Medication 3 MILLILITER(S): at 16:18

## 2022-08-31 RX ADMIN — Medication 60 MILLIGRAM(S): at 17:31

## 2022-08-31 RX ADMIN — Medication 60 MILLIGRAM(S): at 05:21

## 2022-08-31 RX ADMIN — Medication 3 MILLILITER(S): at 08:40

## 2022-08-31 RX ADMIN — Medication 5 MILLIGRAM(S): at 05:21

## 2022-08-31 RX ADMIN — RIVAROXABAN 20 MILLIGRAM(S): KIT at 17:32

## 2022-08-31 RX ADMIN — Medication 50 MILLIGRAM(S): at 05:21

## 2022-08-31 RX ADMIN — Medication 50 MILLIGRAM(S): at 17:31

## 2022-08-31 NOTE — DISCHARGE NOTE PROVIDER - HOSPITAL COURSE
89yr old M w/a PMH of Dementia, HTN, HLD, prostate CA, RA, hard of hearing, RUE DVT, Cellulitis presents from [facility] after a fall noted to have tachycardia, increased wob with hypoxia, edema, elevated prbnp, + enterovirus admitted to [facility] for AHRF in setting of CHF exacerbation and Viral PNA. Pt was initiated on IV abx with benefit. Cultures NGTD. Pt was initiated on supplemental oxygen and weaned as tolerated, pt currently on 3LNC. Cardiology was consulted for CHF and AF w RVR. Medications were adjusted and pt has since improved. Pt is now medically stable for discharge with appropriate outpatient follow up.    All electrolyte abnormalities were monitored carefully and repleted as necessary during this hospitalization. At the time of discharge patient was hemodynamically stable and amenable to all terms of discharge.

## 2022-08-31 NOTE — PROGRESS NOTE ADULT - NUTRITIONAL ASSESSMENT
This patient has been assessed with a concern for Malnutrition and has been determined to have a diagnosis/diagnoses of Moderate protein-calorie malnutrition.    This patient is being managed with:   Diet DASH/TLC-  Sodium & Cholesterol Restricted  Moderately Thick Liquids (MODTHICKLIQS)  Entered: Aug 23 2022 10:05AM    
This patient has been assessed with a concern for Malnutrition and has been determined to have a diagnosis/diagnoses of Moderate protein-calorie malnutrition.    This patient is being managed with:   Diet Regular-  DASH/TLC {Sodium & Cholesterol Restricted} (DASH)  Entered: Aug 30 2022 12:15PM    
This patient has been assessed with a concern for Malnutrition and has been determined to have a diagnosis/diagnoses of Moderate protein-calorie malnutrition.    This patient is being managed with:   Diet DASH/TLC-  Sodium & Cholesterol Restricted  Moderately Thick Liquids (MODTHICKLIQS)  Entered: Aug 23 2022 10:05AM

## 2022-08-31 NOTE — DISCHARGE NOTE PROVIDER - NSDCFUSCHEDAPPT_GEN_ALL_CORE_FT
Lane Maier  [facility]  PULMMED [address]  Scheduled Appointment: 09/14/2022

## 2022-08-31 NOTE — DISCHARGE NOTE PROVIDER - DETAILS OF MALNUTRITION DIAGNOSIS/DIAGNOSES
This patient has been assessed with a concern for Malnutrition and was treated during this hospitalization for the following Nutrition diagnosis/diagnoses:     -  08/26/2022: Moderate protein-calorie malnutrition

## 2022-08-31 NOTE — DISCHARGE NOTE PROVIDER - CARE PROVIDER_API CALL
Fausto Bains (DO)  Cardiovascular Disease; Internal Medicine  [address] [place] [address]  Phone: [phone]  Fax: [phone]  Follow Up Time: 1 week    PCP,   Phone: (   )    -  Fax: (   )    -  Follow Up Time: 1 week

## 2022-08-31 NOTE — PROGRESS NOTE ADULT - PROVIDER SPECIALTY LIST ADULT
Cardiology
Hospitalist
Cardiology
Cardiology
Hospitalist
Cardiology
Hospitalist
Internal Medicine

## 2022-08-31 NOTE — DISCHARGE NOTE PROVIDER - PROVIDER TOKENS
PROVIDER:[TOKEN:[[id number]],FOLLOWUP:[1 week]],FREE:[LAST:[PCP],PHONE:[(   )    -],FAX:[(   )    -],FOLLOWUP:[1 week]]

## 2022-08-31 NOTE — PROGRESS NOTE ADULT - SUBJECTIVE AND OBJECTIVE BOX
[place] [facility] - [facility], THE HEART CENTER                                   [address] [place] [address]                                                      PHONE: [phone]                                                         FAX: [phone]  [email or web address]  ---------------------------------------------------------------------------------------------------------------------------------    Overnight events/patient complaints: patient seen at bedside.       penicillin (Hives)    MEDICATIONS  (STANDING):  albuterol/ipratropium for Nebulization 3 milliLiter(s) Nebulizer every 8 hours  atorvastatin 20 milliGRAM(s) Oral at bedtime  azithromycin   Tablet 500 milliGRAM(s) Oral daily  cefTRIAXone   IVPB 1000 milliGRAM(s) IV Intermittent every 24 hours  diltiazem    Tablet 60 milliGRAM(s) Oral four times a day  donepezil 10 milliGRAM(s) Oral at bedtime  guaiFENesin  milliGRAM(s) Oral every 12 hours  metoprolol succinate ER 50 milliGRAM(s) Oral daily  mirtazapine 15 milliGRAM(s) Oral at bedtime  predniSONE   Tablet 5 milliGRAM(s) Oral daily  rivaroxaban 20 milliGRAM(s) Oral with dinner  traZODone 100 milliGRAM(s) Oral at bedtime    MEDICATIONS  (PRN):  metoprolol tartrate Injectable 5 milliGRAM(s) IV Push every 6 hours PRN hr > 130      Vital Signs Last 24 Hrs  T(C): 37 (22 Aug 2022 11:54), Max: 39.2 (21 Aug 2022 22:22)  T(F): 98.6 (22 Aug 2022 11:54), Max: 102.6 (21 Aug 2022 22:22)  HR: 135 (22 Aug 2022 12:00) (73 - 155)  BP: 129/88 (22 Aug 2022 12:00) (106/54 - 154/70)  BP(mean): 97 (22 Aug 2022 12:00) (67 - 97)  RR: 24 (22 Aug 2022 12:00) (18 - 26)  SpO2: 96% (22 Aug 2022 12:00) (92% - 98%)    Parameters below as of 22 Aug 2022 12:00  Patient On (Oxygen Delivery Method): nasal cannula  O2 Flow (L/min): 3    ICU Vital Signs Last 24 Hrs  DOMONIQUE PRO  I&O's Detail    21 Aug 2022 07:01  -  22 Aug 2022 07:00  --------------------------------------------------------  IN:    IV PiggyBack: 50 mL  Total IN: 50 mL    OUT:    Indwelling Catheter - Urethral (mL): 2150 mL  Total OUT: 2150 mL    Total NET: -2100 mL        Drug Dosing Weight  DOMONIQUE PRO      PHYSICAL EXAM:  General: NAD  HEENT: Head; normocephalic, atraumatic.  Eyes: Pupils reactive, cornea wnl.  Neck: Supple, no nodes adenopathy, no NVD or carotid bruit or thyromegaly.  CARDIOVASCULAR: irregularly irregular, Normal S1 and S2, No murmur, rub, gallop or lift.   LUNGS: coarse breath sounds b/l  ABDOMEN: Soft, nontender without mass or organomegaly. bowel sounds normoactive.  EXTREMITIES: No clubbing, cyanosis or edema. Distal pulses wnl.   SKIN: warm and dry with normal turgor.  NEURO: Alert/oriented x 3  PSYCH: normal affect.        LABS:                        11.4   11.83 )-----------( 183      ( 22 Aug 2022 09:50 )             36.3     08-    142  |  103  |  29.6<H>  ----------------------------<  118<H>  3.6   |  29.0  |  1.22    Ca    7.9<L>      22 Aug 2022 09:50  Phos  2.4     08-  Mg     2.0     08-    TPro  5.1<L>  /  Alb  2.4<L>  /  TBili  0.5  /  DBili  x   /  AST  19  /  ALT  11  /  AlkPhos  58  08-    DOMONIQUE MAST        Urinalysis Basic - ( 20 Aug 2022 16:30 )    Color: Yellow / Appearance: Clear / S.015 / pH: x  Gluc: x / Ketone: Negative  / Bili: Negative / Urobili: Negative mg/dL   Blood: x / Protein: Negative / Nitrite: Negative   Leuk Esterase: Trace / RBC: 0-2 /HPF / WBC 0-2 /HPF   Sq Epi: x / Non Sq Epi: Occasional / Bacteria: x        RADIOLOGY & ADDITIONAL STUDIES:    INTERPRETATION OF TELEMETRY (personally reviewed): atrial fibrillation with -140 bpm        ASSESSMENT AND PLAN:  In summary, DOMONIQUE MAST is an 89y Male with past medical history significant for Dementia, HTN, HLD, prostate CA, RA, hard of hearing, RUE DVT, Cellulitis presents from [facility] after a fall noted to have tachycardia, increased wob with hypoxia, edema, elevated prbnp, + enterovirus.    - telemetry reviewed and shows afib with -140 bpm  - switch Toprol to Lopressor and increase to 50 mg bid -- can titrate up as needed as long as BP tolerates  - Lopressor 5 mg IVP prn for sustained tachycardia > 140 bpm  - continue Xarelto daily for CVA prevention  - echo still pending will hopefully be done today  - continue telemetry    Will follow closely with you.     Time Spent: 35 minutes     
[facility] Division of Hospital Medicine    Chief Complaint:    SOB, Enterovirus PNA, AHRF    SUBJECTIVE / OVERNIGHT EVENTS:  Maintains oxygen requirement  Still on 5-6 L NC   Not tolerating Percussion Vest per RT  Patient denies chest pain, SOB, abd pain, N/V, fever, chills, dysuria or any other complaints. All remainder ROS negative.     MEDICATIONS  (STANDING):  albuterol/ipratropium for Nebulization 3 milliLiter(s) Nebulizer every 8 hours  atorvastatin 20 milliGRAM(s) Oral at bedtime  diltiazem    Tablet 60 milliGRAM(s) Oral four times a day  donepezil 10 milliGRAM(s) Oral at bedtime  metoprolol tartrate 50 milliGRAM(s) Oral two times a day  mirtazapine 15 milliGRAM(s) Oral at bedtime  predniSONE   Tablet 5 milliGRAM(s) Oral daily  rivaroxaban 20 milliGRAM(s) Oral with dinner  traZODone 100 milliGRAM(s) Oral at bedtime    MEDICATIONS  (PRN):  metoprolol tartrate Injectable 5 milliGRAM(s) IV Push every 6 hours PRN hr > 130        PHYSICAL EXAM:  Vital Signs Last 24 Hrs  T(C): 36.8 (28 Aug 2022 10:43), Max: 36.8 (28 Aug 2022 10:43)  T(F): 98.2 (28 Aug 2022 10:43), Max: 98.2 (28 Aug 2022 10:43)  HR: 69 (28 Aug 2022 10:43) (67 - 89)  BP: 154/81 (28 Aug 2022 10:43) (147/75 - 161/82)  BP(mean): --  RR: 18 (28 Aug 2022 10:43) (18 - 20)  SpO2: 96% (28 Aug 2022 10:43) (95% - 100%)    Parameters below as of 28 Aug 2022 10:43  Patient On (Oxygen Delivery Method): nasal cannula  O2 Flow (L/min): 6          CONSTITUTIONAL: NAD, elderly  ENMT: Moist oral mucosa, no pharyngeal injection or exudates; normal dentition  RESPIRATORY: Normal respiratory effort; lungs are clear to auscultation bilaterally  CARDIOVASCULAR: Regular rate and rhythm, normal S1 and S2, no murmur/rub/gallop; Peripheral pulses are 2+ bilaterally  ABDOMEN: Nontender to palpation, normoactive bowel sounds, no rebound/guarding;   MUSCLOSKELETAL:  No clubbing or cyanosis of digits; no joint swelling or tenderness to palpation  PSYCH: A+O to person, place, and time; affect appropriate  NEUROLOGY: CN 2-12 are intact and symmetric; no gross sensory deficits;   SKIN: No rashes; no palpable lesions    LABS:                                   9.9    8.14  )-----------( 198      ( 28 Aug 2022 05:20 )             33.0   08-28    146<H>  |  111<H>  |  24.7<H>  ----------------------------<  101<H>  4.0   |  26.0  |  0.86    Ca    7.9<L>      28 Aug 2022 05:20  Phos  2.0     08-28  Mg     2.2     08-28                  CAPILLARY BLOOD GLUCOSE            RADIOLOGY & ADDITIONAL TESTS:  Results Reviewed:   Imaging Personally Reviewed:  Electrocardiogram Personally Reviewed:                                          
                [place] [facility] - [facility], THE HEART CENTER                                   [address] [place] [address]                                                      PHONE: [phone]                                                         FAX: [phone]  [email or web address]  ---------------------------------------------------------------------------------------------------------------------------------    Overnight events/patient complaints: patient seen at bedside.       penicillin (Hives)    MEDICATIONS  (STANDING):  albuterol/ipratropium for Nebulization 3 milliLiter(s) Nebulizer every 8 hours  atorvastatin 20 milliGRAM(s) Oral at bedtime  azithromycin   Tablet 500 milliGRAM(s) Oral daily  cefTRIAXone   IVPB 1000 milliGRAM(s) IV Intermittent every 24 hours  diltiazem    Tablet 60 milliGRAM(s) Oral four times a day  donepezil 10 milliGRAM(s) Oral at bedtime  guaiFENesin  milliGRAM(s) Oral every 12 hours  metoprolol tartrate 50 milliGRAM(s) Oral two times a day  mirtazapine 15 milliGRAM(s) Oral at bedtime  predniSONE   Tablet 5 milliGRAM(s) Oral daily  rivaroxaban 20 milliGRAM(s) Oral with dinner  traZODone 100 milliGRAM(s) Oral at bedtime    MEDICATIONS  (PRN):  metoprolol tartrate Injectable 5 milliGRAM(s) IV Push every 6 hours PRN hr > 130      Vital Signs Last 24 Hrs  T(C): 36.8 (24 Aug 2022 08:38), Max: 36.9 (23 Aug 2022 16:16)  T(F): 98.2 (24 Aug 2022 08:38), Max: 98.5 (23 Aug 2022 16:16)  HR: 68 (24 Aug 2022 08:38) (68 - 81)  BP: 147/80 (24 Aug 2022 08:38) (127/69 - 170/83)  BP(mean): 74 (23 Aug 2022 11:55) (74 - 74)  RR: 18 (24 Aug 2022 08:38) (18 - 21)  SpO2: 96% (24 Aug 2022 08:38) (95% - 97%)    Parameters below as of 24 Aug 2022 08:38  Patient On (Oxygen Delivery Method): nasal cannula  O2 Flow (L/min): 4    ICU Vital Signs Last 24 Hrs  DOMONIQUE PRO  I&O's Detail    23 Aug 2022 07:01  -  24 Aug 2022 07:00  --------------------------------------------------------  IN:  Total IN: 0 mL    OUT:    Indwelling Catheter - Urethral (mL): 270 mL    Voided (mL): 400 mL  Total OUT: 670 mL    Total NET: -670 mL        Drug Dosing Weight  DOMONIQUE MAST      PHYSICAL EXAM:  General: NAD  HEENT: Head; normocephalic, atraumatic.  Eyes: Pupils reactive, cornea wnl.  Neck: Supple, no nodes adenopathy, no NVD or carotid bruit or thyromegaly.  CARDIOVASCULAR: Normal S1 and S2, No murmur, rub, gallop or lift.   LUNGS: No rales, rhonchi or wheeze. Normal breath sounds bilaterally.  ABDOMEN: Soft, nontender without mass or organomegaly. bowel sounds normoactive.  EXTREMITIES: No clubbing, cyanosis or edema. Distal pulses wnl.   SKIN: warm and dry with normal turgor.  NEURO: Alert/oriented x 3  PSYCH: normal affect.        LABS:                        9.3    6.30  )-----------( 157      ( 24 Aug 2022 05:34 )             30.7     08-24    145  |  108<H>  |  29.8<H>  ----------------------------<  91  3.3<L>   |  28.0  |  0.95    Ca    7.6<L>      24 Aug 2022 05:34  Phos  2.0     08-24  Mg     2.2     08-24      INTERPRETATION OF TELEMETRY (personally reviewed): normal sinus rhythm         ASSESSMENT AND PLAN:  In summary, DOMONIQUE MAST is an 89y Male with past medical history significant for Dementia, HTN, HLD, prostate CA, RA, hard of hearing, RUE DVT, Cellulitis presents from [facility] after a fall noted to have tachycardia, increased wob with hypoxia, edema, elevated prbnp, + enterovirus.    - telemetry reviewed and shows normal sinus rhythm since yesterday  - Lopressor 50 mg BID and Cardizem 60 mg q 6 hours -- will transition to long acting prior to discharge  - continue Xarelto daily for CVA prevention  - echo reviewed and shows no underlying structural heart disease  - continue telemetry    Will follow closely with you.     
                [place] [facility] - [facility], THE HEART CENTER                                   [address] [place] [address]                                                      PHONE: [phone]                                                         FAX: [phone]  [email or web address]  ---------------------------------------------------------------------------------------------------------------------------------    Overnight events/patient complaints:  pt now in NSR    penicillin (Hives)    MEDICATIONS  (STANDING):  albuterol/ipratropium for Nebulization 3 milliLiter(s) Nebulizer every 8 hours  atorvastatin 20 milliGRAM(s) Oral at bedtime  azithromycin   Tablet 500 milliGRAM(s) Oral daily  cefTRIAXone   IVPB 1000 milliGRAM(s) IV Intermittent every 24 hours  diltiazem    Tablet 60 milliGRAM(s) Oral four times a day  donepezil 10 milliGRAM(s) Oral at bedtime  guaiFENesin  milliGRAM(s) Oral every 12 hours  metoprolol tartrate 50 milliGRAM(s) Oral two times a day  mirtazapine 15 milliGRAM(s) Oral at bedtime  predniSONE   Tablet 5 milliGRAM(s) Oral daily  rivaroxaban 20 milliGRAM(s) Oral with dinner  traZODone 100 milliGRAM(s) Oral at bedtime    MEDICATIONS  (PRN):  metoprolol tartrate Injectable 5 milliGRAM(s) IV Push every 6 hours PRN hr > 130      Vital Signs Last 24 Hrs  T(C): 37.1 (23 Aug 2022 07:56), Max: 37.1 (23 Aug 2022 07:56)  T(F): 98.7 (23 Aug 2022 07:56), Max: 98.7 (23 Aug 2022 07:56)  HR: 64 (23 Aug 2022 08:00) (64 - 155)  BP: 136/69 (23 Aug 2022 08:00) (119/50 - 150/67)  BP(mean): 85 (23 Aug 2022 08:00) (66 - 97)  RR: 20 (23 Aug 2022 08:00) (19 - 24)  SpO2: 95% (23 Aug 2022 08:00) (92% - 99%)    Parameters below as of 23 Aug 2022 08:00  Patient On (Oxygen Delivery Method): nasal cannula  O2 Flow (L/min): 5    ICU Vital Signs Last 24 Hrs  DOMONIQUE PRO  I&O's Detail    22 Aug 2022 07:01  -  23 Aug 2022 07:00  --------------------------------------------------------  IN:  Total IN: 0 mL    OUT:    Indwelling Catheter - Urethral (mL): 750 mL    Voided (mL): 300 mL  Total OUT: 1050 mL    Total NET: -1050 mL      23 Aug 2022 07:01  -  23 Aug 2022 08:50  --------------------------------------------------------  IN:  Total IN: 0 mL    OUT:    Indwelling Catheter - Urethral (mL): 170 mL  Total OUT: 170 mL    Total NET: -170 mL        Drug Dosing Weight  DOMONIQUE PRO      PHYSICAL EXAM:  General:  alert and cooperative.  HEENT: Head; normocephalic, atraumatic.  Eyes: Pupils reactive, cornea wnl.  Neck: Supple, no nodes adenopathy, no NVD or carotid bruit or thyromegaly.  CARDIOVASCULAR: Normal S1 and S2, No murmur, rub, gallop or lift.   LUNGS: No rales, rhonchi or wheeze. Normal breath sounds bilaterally.  ABDOMEN: Soft, nontender without mass or organomegaly. bowel sounds normoactive.  EXTREMITIES: No clubbing, cyanosis or edema. Distal pulses wnl.   SKIN: warm and dry with normal turgor.  NEURO: Alert/oriented x 3/normal motor exam. No pathologic reflexes.    PSYCH: normal affect.        LABS:                        9.7    8.25  )-----------( 156      ( 23 Aug 2022 06:20 )             31.1     08-23    145  |  106  |  32.6<H>  ----------------------------<  89  3.7   |  28.0  |  1.11    Ca    7.9<L>      23 Aug 2022 06:20  Phos  2.0     08-23  Mg     2.0     08-23    TPro  5.1<L>  /  Alb  2.4<L>  /  TBili  0.5  /  DBili  x   /  AST  19  /  ALT  11  /  AlkPhos  58  08-21    DOMONIQUE MAST            RADIOLOGY & ADDITIONAL STUDIES:    INTERPRETATION OF TELEMETRY: NSTR   (personally reviewed): NSR now prior afib      In summary, DOMONIQUE MAST is an 89y Male with past medical history significant for Dementia, HTN, HLD, prostate CA, RA, hard of hearing, RUE DVT, Cellulitis presents from [facility] after a fall noted to have tachycardia, increased wob with hypoxia, edema, elevated prbnp, + enterovirus.    - telemetry reviewed and shows afib with -140 bpm but now NSr  - cw Toprol 50 mg bid -- can titrate up as needed as long as BP tolerates but in NSR now so can continue  - Lopressor 5 mg IVP prn for sustained tachycardia > 140 bpm  - continue Xarelto daily for CVA prevention  - echo still pending will hopefully be done soon  - continue telemetry
[facility] Division of Hospital Medicine    Chief Complaint:    SOB, AHRF    SUBJECTIVE / OVERNIGHT EVENTS:  Transitioned to NC. BIPAP overnight   Still with significant wet cough and difficulty clearing airway.    Patient denies chest pain, abd pain, N/V, fever, chills, dysuria or any other complaints. All remainder ROS negative.     MEDICATIONS  (STANDING):  albuterol/ipratropium for Nebulization 3 milliLiter(s) Nebulizer every 8 hours  atorvastatin 20 milliGRAM(s) Oral at bedtime  azithromycin   Tablet 500 milliGRAM(s) Oral daily  cefTRIAXone   IVPB 1000 milliGRAM(s) IV Intermittent every 24 hours  diltiazem    Tablet 60 milliGRAM(s) Oral four times a day  donepezil 10 milliGRAM(s) Oral at bedtime  guaiFENesin  milliGRAM(s) Oral every 12 hours  metoprolol succinate ER 50 milliGRAM(s) Oral daily  mirtazapine 15 milliGRAM(s) Oral at bedtime  predniSONE   Tablet 5 milliGRAM(s) Oral daily  rivaroxaban 20 milliGRAM(s) Oral with dinner  traZODone 100 milliGRAM(s) Oral at bedtime    MEDICATIONS  (PRN):  metoprolol tartrate Injectable 5 milliGRAM(s) IV Push every 6 hours PRN hr > 130        I&O's Summary    21 Aug 2022 07:01  -  22 Aug 2022 07:00  --------------------------------------------------------  IN: 50 mL / OUT: 2150 mL / NET: -2100 mL        PHYSICAL EXAM:  Vital Signs Last 24 Hrs  T(C): 37 (22 Aug 2022 11:54), Max: 39.2 (21 Aug 2022 22:22)  T(F): 98.6 (22 Aug 2022 11:54), Max: 102.6 (21 Aug 2022 22:22)  HR: 135 (22 Aug 2022 12:00) (73 - 155)  BP: 129/88 (22 Aug 2022 12:00) (106/54 - 154/70)  BP(mean): 97 (22 Aug 2022 12:00) (67 - 97)  RR: 24 (22 Aug 2022 12:00) (18 - 26)  SpO2: 96% (22 Aug 2022 12:00) (92% - 98%)    Parameters below as of 22 Aug 2022 12:00  Patient On (Oxygen Delivery Method): nasal cannula  O2 Flow (L/min): 3          CONSTITUTIONAL: NAD, elderly  ENMT: Moist oral mucosa, no pharyngeal injection or exudates; normal dentition  RESPIRATORY: Normal respiratory effort; lungs are clear to auscultation bilaterally  CARDIOVASCULAR: Regular rate and rhythm, normal S1 and S2, no murmur/rub/gallop; Peripheral pulses are 2+ bilaterally  ABDOMEN: Nontender to palpation, normoactive bowel sounds, no rebound/guarding;   MUSCLOSKELETAL:  No clubbing or cyanosis of digits; no joint swelling or tenderness to palpation  PSYCH: A+O to person, place; affect appropriate  NEUROLOGY: CN 2-12 are intact and symmetric; no gross sensory deficits;   SKIN: No rashes; no palpable lesions    LABS:                        11.4   11.83 )-----------( 183      ( 22 Aug 2022 09:50 )             36.3     08-22    142  |  103  |  29.6<H>  ----------------------------<  118<H>  3.6   |  29.0  |  1.22    Ca    7.9<L>      22 Aug 2022 09:50  Phos  2.4     08-22  Mg     2.0     08-22    TPro  5.1<L>  /  Alb  2.4<L>  /  TBili  0.5  /  DBili  x   /  AST  19  /  ALT  11  /  AlkPhos  58  08-21          Urinalysis Basic - ( 20 Aug 2022 16:30 )    Color: Yellow / Appearance: Clear / S.015 / pH: x  Gluc: x / Ketone: Negative  / Bili: Negative / Urobili: Negative mg/dL   Blood: x / Protein: Negative / Nitrite: Negative   Leuk Esterase: Trace / RBC: 0-2 /HPF / WBC 0-2 /HPF   Sq Epi: x / Non Sq Epi: Occasional / Bacteria: x        Culture - Urine (collected 20 Aug 2022 16:30)  Source: Catheterized Catheterized  Final Report (21 Aug 2022 23:01):    No growth      CAPILLARY BLOOD GLUCOSE            RADIOLOGY & ADDITIONAL TESTS:  Results Reviewed:   Imaging Personally Reviewed:  Electrocardiogram Personally Reviewed:                                          
[facility] Division of Hospital Medicine  Ren Alvarez MD    Chief Complaint:  Patient is a 89y old  Male who presents with a chief complaint of Acute Respiratory Failure  Viral PNA (28 Aug 2022 13:45)      SUBJECTIVE / OVERNIGHT EVENTS:  Patient seen and examined at bedside. No acute events reported overnight. No new complaints.    MEDICATIONS  (STANDING):  albuterol/ipratropium for Nebulization 3 milliLiter(s) Nebulizer every 8 hours  atorvastatin 20 milliGRAM(s) Oral at bedtime  diltiazem    Tablet 60 milliGRAM(s) Oral four times a day  donepezil 10 milliGRAM(s) Oral at bedtime  metoprolol tartrate 50 milliGRAM(s) Oral two times a day  mirtazapine 15 milliGRAM(s) Oral at bedtime  potassium phosphate / sodium phosphate Powder (PHOS-NaK) 1 Packet(s) Oral once  predniSONE   Tablet 5 milliGRAM(s) Oral daily  rivaroxaban 20 milliGRAM(s) Oral with dinner  traZODone 100 milliGRAM(s) Oral at bedtime    MEDICATIONS  (PRN):  metoprolol tartrate Injectable 5 milliGRAM(s) IV Push every 6 hours PRN hr > 130        I&O's Summary    28 Aug 2022 07:01  -  29 Aug 2022 07:00  --------------------------------------------------------  IN: 0 mL / OUT: 700 mL / NET: -700 mL        PHYSICAL EXAM:  Vital Signs Last 24 Hrs  T(C): 36.6 (29 Aug 2022 10:10), Max: 36.7 (28 Aug 2022 20:30)  T(F): 97.9 (29 Aug 2022 10:10), Max: 98.1 (28 Aug 2022 20:30)  HR: 79 (29 Aug 2022 10:10) (75 - 100)  BP: 158/80 (29 Aug 2022 10:10) (132/65 - 158/80)  BP(mean): --  RR: 20 (29 Aug 2022 10:10) (18 - 20)  SpO2: 97% (29 Aug 2022 10:10) (92% - 100%)    Parameters below as of 29 Aug 2022 10:10  Patient On (Oxygen Delivery Method): nasal cannula  O2 Flow (L/min): 4      CONSTITUTIONAL: NAD, elderly  ENMT: Moist oral mucosa, no pharyngeal injection or exudates; normal dentition  RESPIRATORY: Normal respiratory effort; lungs are clear to auscultation bilaterally  CARDIOVASCULAR: Regular rate and rhythm, normal S1 and S2, no murmur/rub/gallop; Peripheral pulses are 2+ bilaterally  ABDOMEN: Nontender to palpation, normoactive bowel sounds, no rebound/guarding;   MUSCLOSKELETAL:  No clubbing or cyanosis of digits; no joint swelling or tenderness to palpation  PSYCH: A+O to person, place, and time; affect appropriate  NEUROLOGY: CN 2-12 are intact and symmetric; no gross sensory deficits;   SKIN: No rashes; no palpable lesions    LABS:                        10.7   8.19  )-----------( 209      ( 29 Aug 2022 06:10 )             36.1     08-29    143  |  109<H>  |  23.9<H>  ----------------------------<  87  4.3   |  24.0  |  0.85    Ca    8.2<L>      29 Aug 2022 06:10  Phos  2.2     08-29  Mg     2.1     08-29                CAPILLARY BLOOD GLUCOSE            RADIOLOGY & ADDITIONAL TESTS:  Results Reviewed:   Imaging Personally Reviewed:  Electrocardiogram Personally Reviewed:                                          
                [place] [facility], THE HEART CENTER                                   [address] [place] [address]                                                      PHONE: [phone]                                                         FAX: [phone]  [email or web address]  ---------------------------------------------------------------------------------------------------------------------------------    Reason for Consult:    HPI:  DOMONIQUE MAST is an 89y Male with hx dementia, NH resident, paroxysmal AF, RUE DVT 1 month ago, on Xarelto as outpt, HTN, sent from NH for fall and SOB. Pt cannot give history, does not answer questions, is soiled with feces. Was placed on BiPAP for resp distress and cough.  We were called for concern for CHF.  Received IV lasix, IV abx here.    PAST MEDICAL & SURGICAL HISTORY:  HTN (hypertension)      HLD (hyperlipidemia)      Dementia      Prostate cancer      H/O: depression      [place] (hard of hearing)      No significant past surgical history          penicillin (Hives)      MEDICATIONS  (STANDING):  haloperidol    Injectable 2 milliGRAM(s) IV Push Once    MEDICATIONS  (PRN):      Social History:  Cigarettes: unable to obtain social history from pt due to dementia      ROS: Negative other than as mentioned in HPI.    Vital Signs Last 24 Hrs  T(C): 36.9 (20 Aug 2022 09:08), Max: 36.9 (20 Aug 2022 09:08)  T(F): 98.5 (20 Aug 2022 09:08), Max: 98.5 (20 Aug 2022 09:08)  HR: 134 (20 Aug 2022 14:27) (111 - 138)  BP: 141/71 (20 Aug 2022 14:27) (141/71 - 177/91)  BP(mean): --  RR: 26 (20 Aug 2022 14:27) (20 - 30)  SpO2: 95% (20 Aug 2022 14:27) (92% - 97%)    Parameters below as of 20 Aug 2022 14:27  Patient On (Oxygen Delivery Method): BiPAP/CPAP  O2 Flow (L/min): 50    ICU Vital Signs Last 24 Hrs  DOMONIQUE PRO  I&O's Detail    I&O's Summary    Drug Dosing Weight  DOMONIQUE MAST      PHYSICAL EXAM:  General:  alert and cooperative.  HEENT: Head; normocephalic, atraumatic.  Eyes: Pupils reactive, cornea wnl.  Neck: Supple, no nodes adenopathy, no NVD or carotid bruit or thyromegaly.  CARDIOVASCULAR: tachycardic, regular,  Normal S1 and S2, No murmur, rub, gallop or lift.   LUNGS: No rales, rhonchi or wheeze. Normal breath sounds bilaterally.  ABDOMEN: Soft, nontender without mass or organomegaly. bowel sounds normoactive.  EXTREMITIES: No clubbing, cyanosis or edema. Distal pulses wnl.   SKIN: warm and dry with normal turgor.  NEURO: Alert/oriented x 3/normal motor exam. No pathologic reflexes.    PSYCH: normal affect.        LABS:                        11.4   10.52 )-----------( 172      ( 20 Aug 2022 09:30 )             35.5     08-20    139  |  103  |  15.8  ----------------------------<  132<H>  4.5   |  25.0  |  1.16    Ca    8.3<L>      20 Aug 2022 13:49    TPro  5.6<L>  /  Alb  2.6<L>  /  TBili  0.6  /  DBili  x   /  AST  36  /  ALT  13  /  AlkPhos  68  08-20    DOMONIQUE MAST  CARDIAC MARKERS ( 20 Aug 2022 13:49 )  x     / 0.02 ng/mL / x     / x     / x      CARDIAC MARKERS ( 20 Aug 2022 09:30 )  x     / 0.01 ng/mL / x     / x     / x          PT/INR - ( 20 Aug 2022 10:35 )   PT: 16.7 sec;   INR: 1.43 ratio         PTT - ( 20 Aug 2022 10:35 )  PTT:29.3 sec      RADIOLOGY & ADDITIONAL STUDIES:    INTERPRETATION OF TELEMETRY (personally reviewed):    ECG: sinus tachy    ECHO: 4/2021: EF normal, mild MR, moderate TR        Assessment and Plan:  Advanced dementia  Resp distress, on BiPAP  CT chest consistent with mucoid plugging RLL, small L pleural effusion  RVP + for rhinovirus  Maybe component of acute on chronic diastolic CHF, IV lasix is reasonable for now.  Trend BUN/Cr/K  Check echo  Continue Xarelto given recent acute DVT RUE.  Hgb stable.  Continue metoprolol  ICU was called  Will follow    
                [facility] - [facility]                                   [address] [place] [address]                                                      PHONE: [phone]                                                         FAX: [phone]  [email or web address]  ---------------------------------------------------------------------------------------------------------------------------------    Overnight events/patient complaints:  Pt feels well    penicillin (Hives)    MEDICATIONS  (STANDING):  albuterol/ipratropium for Nebulization 3 milliLiter(s) Nebulizer every 8 hours  atorvastatin 20 milliGRAM(s) Oral at bedtime  diltiazem    Tablet 60 milliGRAM(s) Oral four times a day  donepezil 10 milliGRAM(s) Oral at bedtime  guaiFENesin  milliGRAM(s) Oral every 12 hours  metoprolol tartrate 50 milliGRAM(s) Oral two times a day  mirtazapine 15 milliGRAM(s) Oral at bedtime  potassium phosphate / sodium phosphate Powder (PHOS-NaK) 1 Packet(s) Oral once  predniSONE   Tablet 5 milliGRAM(s) Oral daily  rivaroxaban 20 milliGRAM(s) Oral with dinner  traZODone 100 milliGRAM(s) Oral at bedtime    MEDICATIONS  (PRN):  metoprolol tartrate Injectable 5 milliGRAM(s) IV Push every 6 hours PRN hr > 130      Vital Signs Last 24 Hrs  T(C): 36.7 (25 Aug 2022 05:10), Max: 36.9 (24 Aug 2022 16:29)  T(F): 98.1 (25 Aug 2022 05:10), Max: 98.4 (24 Aug 2022 16:29)  HR: 88 (25 Aug 2022 09:03) (70 - 88)  BP: 137/68 (25 Aug 2022 05:10) (126/69 - 137/68)  BP(mean): --  RR: 18 (25 Aug 2022 05:10) (18 - 19)  SpO2: 97% (25 Aug 2022 09:03) (90% - 100%)    Parameters below as of 25 Aug 2022 09:03  Patient On (Oxygen Delivery Method): nasal cannula,5l      ICU Vital Signs Last 24 Hrs  DOMONIQUE MAST  I&O's Detail    Drug Dosing Weight  DOMONIQUE MAST      PHYSICAL EXAM:  General:  alert and cooperative.  HEENT: Head; normocephalic, atraumatic.  Eyes: Pupils reactive, cornea wnl.  Neck: Supple, no nodes adenopathy, no NVD or carotid bruit or thyromegaly.  CARDIOVASCULAR: Normal S1 and S2, No murmur, rub, gallop or lift.   LUNGS: No rales, rhonchi or wheeze. Normal breath sounds bilaterally.  ABDOMEN: Soft, nontender without mass or organomegaly. bowel sounds normoactive.  EXTREMITIES: No clubbing, cyanosis or edema. Distal pulses wnl.   SKIN: warm and dry with normal turgor.  NEURO: Alert/oriented x 3/normal motor exam. No pathologic reflexes.    PSYCH: normal affect.        LABS:                        9.6    8.78  )-----------( 181      ( 25 Aug 2022 07:19 )             32.1     08-25    146<H>  |  109<H>  |  29.1<H>  ----------------------------<  85  4.0   |  27.0  |  0.87    Ca    8.0<L>      25 Aug 2022 07:19  Phos  2.2     08-25  Mg     2.2     08-25      DOMONIQUE MAST            RADIOLOGY & ADDITIONAL STUDIES:    INTERPRETATION OF TELEMETRY (personally reviewed): normal sinus rhythm         ASSESSMENT AND PLAN:  In summary, DOMONIQUE MAST is an 89y Male with past medical history significant for Dementia, HTN, HLD, prostate CA, RA, hard of hearing, RUE DVT, Cellulitis presents from [facility] after a [facility] noted to have tachycardia, increased wob with hypoxia, edema, elevated prbnp, + enterovirus.    - telemetry reviewed and shows normal sinus rhythm since yesterday, no events  - Lopressor 50 mg BID and Cardizem 60 mg q 6 hours -- will need to transition to long acting prior to discharge  - continue Xarelto daily for CVA prevention  - echo reviewed and shows no underlying structural heart disease  - Please recall as needed       
                [facility] - [facility], [facility]                                   [address] [place] [address]                                                      PHONE: [phone]                                                         FAX: [phone]  [email or web address]  ---------------------------------------------------------------------------------------------------------------------------------    Overnight events/patient complaints: Arousable, does not answer questions.  On nasal O2.  No distress.  Brief AF noted overnight, sinus rhythm now      penicillin (Hives)    MEDICATIONS  (STANDING):  albuterol/ipratropium for Nebulization 3 milliLiter(s) Nebulizer every 8 hours  atorvastatin 20 milliGRAM(s) Oral at bedtime  azithromycin   Tablet 500 milliGRAM(s) Oral daily  cefTRIAXone   IVPB 1000 milliGRAM(s) IV Intermittent every 24 hours  diltiazem    Tablet 60 milliGRAM(s) Oral four times a day  donepezil 10 milliGRAM(s) Oral at bedtime  furosemide   Injectable 40 milliGRAM(s) IV Push daily  guaiFENesin  milliGRAM(s) Oral every 12 hours  metoprolol succinate ER 50 milliGRAM(s) Oral daily  mirtazapine 15 milliGRAM(s) Oral at bedtime  predniSONE   Tablet 5 milliGRAM(s) Oral daily  rivaroxaban 20 milliGRAM(s) Oral with dinner  traZODone 100 milliGRAM(s) Oral at bedtime    MEDICATIONS  (PRN):  metoprolol tartrate Injectable 5 milliGRAM(s) IV Push every 6 hours PRN hr > 130      Vital Signs Last 24 Hrs  T(C): 36.8 (21 Aug 2022 08:00), Max: 37.2 (20 Aug 2022 21:00)  T(F): 98.2 (21 Aug 2022 08:00), Max: 99 (20 Aug 2022 21:00)  HR: 67 (21 Aug 2022 08:00) (67 - 138)  BP: 112/57 (21 Aug 2022 08:00) (105/52 - 177/91)  BP(mean): 69 (21 Aug 2022 08:00) (64 - 91)  RR: 16 (21 Aug 2022 08:00) (16 - 30)  SpO2: 100% (21 Aug 2022 08:00) (92% - 100%)    Parameters below as of 21 Aug 2022 08:00  Patient On (Oxygen Delivery Method): BiPAP/CPAP  O2 Flow (L/min): 2  O2 Concentration (%): 40  ICU Vital Signs Last 24 Hrs  DOMONIQUE MAST  I&O's Detail    20 Aug 2022 07:01  -  21 Aug 2022 07:00  --------------------------------------------------------  IN:  Total IN: 0 mL    OUT:    Indwelling Catheter - Urethral (mL): 1000 mL  Total OUT: 1000 mL    Total NET: -1000 mL        I&O's Summary    20 Aug 2022 07:01  -  21 Aug 2022 07:00  --------------------------------------------------------  IN: 0 mL / OUT: 1000 mL / NET: -1000 mL      Drug Dosing Weight  DOMONIQUE MAST      PHYSICAL EXAM:  General: alert and cooperative.  HEENT: Head; normocephalic, atraumatic.  Eyes: Pupils reactive, cornea wnl.  Neck: Supple, no nodes adenopathy, no NVD or carotid bruit or thyromegaly.  CARDIOVASCULAR: Normal S1 and S2, No murmur, rub, gallop or lift.   LUNGS: Expiratory rhonchi globally.  ABDOMEN: Soft, nontender without mass or organomegaly. bowel sounds normoactive.  EXTREMITIES: mild BLE edema   SKIN: warm and dry with normal turgor.  NEURO: Alert/oriented x 3/normal motor exam. No pathologic reflexes.    PSYCH: normal affect.        LABS:                        11.1   14.73 )-----------( 172      ( 21 Aug 2022 07:50 )             35.4     08-21    143  |  105  |  21.9<H>  ----------------------------<  137<H>  4.1   |  26.0  |  1.31<H>    Ca    8.2<L>      21 Aug 2022 07:50  Phos  3.6     08-  Mg     1.8     08-21    TPro  5.6<L>  /  Alb  2.6<L>  /  TBili  0.6  /  DBili  x   /  AST  36  /  ALT  13  /  AlkPhos  68  08-    DOMONIQUE MAST  CARDIAC MARKERS ( 20 Aug 2022 13:49 )  x     / 0.02 ng/mL / x     / x     / x      CARDIAC MARKERS ( 20 Aug 2022 09:30 )  x     / 0.01 ng/mL / x     / x     / x          PT/INR - ( 20 Aug 2022 10:35 )   PT: 16.7 sec;   INR: 1.43 ratio         PTT - ( 20 Aug 2022 10:35 )  PTT:29.3 sec  Urinalysis Basic - ( 20 Aug 2022 16:30 )    Color: Yellow / Appearance: Clear / S.015 / pH: x  Gluc: x / Ketone: Negative  / Bili: Negative / Urobili: Negative mg/dL   Blood: x / Protein: Negative / Nitrite: Negative   Leuk Esterase: Trace / RBC: 0-2 /HPF / WBC 0-2 /HPF   Sq Epi: x / Non Sq Epi: Occasional / Bacteria: x        RADIOLOGY & ADDITIONAL STUDIES:    INTERPRETATION OF TELEMETRY (personally reviewed): sinus rhythm.  Brief AF overnight, lasted minutes      ECHO: ordered        ASSESSMENT AND PLAN:  Hypoxic resp failure, altered mental status  Looks improved compared to yesterday  Rhinovirus +  I do not think this is decompensated CHF  CT chest revealed mucus plugging RLL  Would use lasix with caution or stop it altogether  Echo pending  Brief/transient AF in setting of resp failure and bronchodilators  Continue B-blocker  Would not give qid diltiazem    
[facility] Division of Hospital Medicine  Ren Alvarez MD    Chief Complaint:  Patient is a 89y old  Male who presents with a chief complaint of Acute Respiratory Failure  Viral PNA (30 Aug 2022 10:05)      SUBJECTIVE / OVERNIGHT EVENTS:  Patient seen and examined at bedside. No acute events reported overnight. No new complaints.    MEDICATIONS  (STANDING):  albuterol/ipratropium for Nebulization 3 milliLiter(s) Nebulizer every 8 hours  atorvastatin 20 milliGRAM(s) Oral at bedtime  diltiazem    Tablet 60 milliGRAM(s) Oral four times a day  donepezil 10 milliGRAM(s) Oral at bedtime  metoprolol tartrate 50 milliGRAM(s) Oral two times a day  mirtazapine 15 milliGRAM(s) Oral at bedtime  predniSONE   Tablet 5 milliGRAM(s) Oral daily  rivaroxaban 20 milliGRAM(s) Oral with dinner  traZODone 100 milliGRAM(s) Oral at bedtime    MEDICATIONS  (PRN):  metoprolol tartrate Injectable 5 milliGRAM(s) IV Push every 6 hours PRN hr > 130        I&O's Summary    30 Aug 2022 07:01  -  31 Aug 2022 07:00  --------------------------------------------------------  IN: 220 mL / OUT: 0 mL / NET: 220 mL        PHYSICAL EXAM:  Vital Signs Last 24 Hrs  T(C): 36.7 (31 Aug 2022 04:49), Max: 37.2 (30 Aug 2022 17:27)  T(F): 98.1 (31 Aug 2022 04:49), Max: 98.9 (30 Aug 2022 17:27)  HR: 83 (31 Aug 2022 09:01) (71 - 89)  BP: 119/56 (31 Aug 2022 04:49) (119/56 - 159/79)  BP(mean): --  RR: 16 (31 Aug 2022 04:49) (16 - 20)  SpO2: 95% (31 Aug 2022 09:01) (90% - 98%)    Parameters below as of 31 Aug 2022 09:01  Patient On (Oxygen Delivery Method): nasal cannula            CONSTITUTIONAL: NAD  HEENT: NC/AT, PERRL, no JVD  RESPIRATORY: CTA bilaterally, normal effort  CARDIOVASCULAR: RRR, S1/S2+, no m/g/r  ABDOMEN: Nontender to palpation, normoactive bowel sounds, no rebound/guarding  MUSCULOSKELETAL: No edema, cyanosis or deformities.  PSYCH: Calm, affect appropriate.  NEUROLOGY: Awake, alert, no focal neurological deficits.   SKIN: No rashes; no palpable lesions  VASC: Distal pulses palpable    LABS:                        10.2   13.93 )-----------( 231      ( 31 Aug 2022 07:30 )             33.8     08-30    144  |  108<H>  |  25.2<H>  ----------------------------<  97  4.1   |  26.0  |  0.88    Ca    8.2<L>      30 Aug 2022 06:12  Phos  2.3     08-30  Mg     2.0     08-30    TPro  5.3<L>  /  Alb  2.5<L>  /  TBili  0.6  /  DBili  x   /  AST  12  /  ALT  8   /  AlkPhos  63  08-30              CAPILLARY BLOOD GLUCOSE            RADIOLOGY & ADDITIONAL TESTS:  Results Reviewed:   Imaging Personally Reviewed:  Electrocardiogram Personally Reviewed:                                          
[facility] Division of Hospital Medicine  Ren Alvarez MD    Chief Complaint:  Patient is a 89y old  Male who presents with a chief complaint of Acute Respiratory Failure  Viral PNA (29 Aug 2022 11:51)      SUBJECTIVE / OVERNIGHT EVENTS:  Patient seen and examined at bedside. No acute events reported overnight. No new complaints. States he doesn't feel ready to go home. Currently on 4LNC.    MEDICATIONS  (STANDING):  albuterol/ipratropium for Nebulization 3 milliLiter(s) Nebulizer every 8 hours  atorvastatin 20 milliGRAM(s) Oral at bedtime  diltiazem    Tablet 60 milliGRAM(s) Oral four times a day  donepezil 10 milliGRAM(s) Oral at bedtime  metoprolol tartrate 50 milliGRAM(s) Oral two times a day  mirtazapine 15 milliGRAM(s) Oral at bedtime  predniSONE   Tablet 5 milliGRAM(s) Oral daily  rivaroxaban 20 milliGRAM(s) Oral with dinner  traZODone 100 milliGRAM(s) Oral at bedtime    MEDICATIONS  (PRN):  metoprolol tartrate Injectable 5 milliGRAM(s) IV Push every 6 hours PRN hr > 130        I&O's Summary    29 Aug 2022 07:01  -  30 Aug 2022 07:00  --------------------------------------------------------  IN: 500 mL / OUT: 500 mL / NET: 0 mL        PHYSICAL EXAM:  Vital Signs Last 24 Hrs  T(C): 36.4 (30 Aug 2022 04:56), Max: 36.8 (29 Aug 2022 18:39)  T(F): 97.6 (30 Aug 2022 04:56), Max: 98.3 (29 Aug 2022 19:47)  HR: 85 (30 Aug 2022 09:02) (76 - 140)  BP: 157/77 (30 Aug 2022 04:56) (107/59 - 158/80)  BP(mean): --  RR: 20 (30 Aug 2022 09:01) (16 - 20)  SpO2: 97% (30 Aug 2022 09:02) (90% - 97%)    Parameters below as of 30 Aug 2022 09:02  Patient On (Oxygen Delivery Method): nasal cannula,4L          CONSTITUTIONAL: NAD, elderly  ENMT: Moist oral mucosa, no pharyngeal injection or exudates; normal dentition  RESPIRATORY: Normal respiratory effort; lungs are clear to auscultation bilaterally  CARDIOVASCULAR: Regular rate and rhythm, normal S1 and S2, no murmur/rub/gallop; Peripheral pulses are 2+ bilaterally  ABDOMEN: Nontender to palpation, normoactive bowel sounds, no rebound/guarding;   MUSCLOSKELETAL:  No clubbing or cyanosis of digits; no joint swelling or tenderness to palpation  PSYCH: A+O to person, place, and time; affect appropriate  NEUROLOGY: CN 2-12 are intact and symmetric; no gross sensory deficits;   SKIN: No rashes; no palpable lesions    LABS:                        11.0   10.60 )-----------( 259      ( 30 Aug 2022 06:12 )             35.7     08-30    144  |  108<H>  |  25.2<H>  ----------------------------<  97  4.1   |  26.0  |  0.88    Ca    8.2<L>      30 Aug 2022 06:12  Phos  2.3     08-30  Mg     2.0     08-30    TPro  5.3<L>  /  Alb  2.5<L>  /  TBili  0.6  /  DBili  x   /  AST  12  /  ALT  8   /  AlkPhos  63  08-30              CAPILLARY BLOOD GLUCOSE            RADIOLOGY & ADDITIONAL TESTS:  Results Reviewed:   Imaging Personally Reviewed:  Electrocardiogram Personally Reviewed:                                          
[facility] Division of Hospital Medicine    Chief Complaint:    SOB    SUBJECTIVE / OVERNIGHT EVENTS:  No events  Improving    Patient denies chest pain, SOB, abd pain, N/V, fever, chills, dysuria or any other complaints. All remainder ROS negative.     MEDICATIONS  (STANDING):  albuterol/ipratropium for Nebulization 3 milliLiter(s) Nebulizer every 8 hours  atorvastatin 20 milliGRAM(s) Oral at bedtime  azithromycin   Tablet 500 milliGRAM(s) Oral daily  cefTRIAXone   IVPB 1000 milliGRAM(s) IV Intermittent every 24 hours  diltiazem    Tablet 60 milliGRAM(s) Oral four times a day  donepezil 10 milliGRAM(s) Oral at bedtime  guaiFENesin  milliGRAM(s) Oral every 12 hours  metoprolol tartrate 50 milliGRAM(s) Oral two times a day  mirtazapine 15 milliGRAM(s) Oral at bedtime  predniSONE   Tablet 5 milliGRAM(s) Oral daily  rivaroxaban 20 milliGRAM(s) Oral with dinner  traZODone 100 milliGRAM(s) Oral at bedtime    MEDICATIONS  (PRN):  metoprolol tartrate Injectable 5 milliGRAM(s) IV Push every 6 hours PRN hr > 130        I&O's Summary    23 Aug 2022 07:01  -  24 Aug 2022 07:00  --------------------------------------------------------  IN: 0 mL / OUT: 670 mL / NET: -670 mL        PHYSICAL EXAM:  Vital Signs Last 24 Hrs  T(C): 36.8 (24 Aug 2022 08:38), Max: 36.9 (23 Aug 2022 16:16)  T(F): 98.2 (24 Aug 2022 08:38), Max: 98.5 (23 Aug 2022 16:16)  HR: 68 (24 Aug 2022 08:38) (68 - 81)  BP: 147/80 (24 Aug 2022 08:38) (127/69 - 170/83)  BP(mean): --  RR: 18 (24 Aug 2022 08:38) (18 - 20)  SpO2: 96% (24 Aug 2022 08:38) (95% - 97%)    Parameters below as of 24 Aug 2022 08:38  Patient On (Oxygen Delivery Method): nasal cannula  O2 Flow (L/min): 4          CONSTITUTIONAL: NAD, elderly  ENMT: Moist oral mucosa, no pharyngeal injection or exudates; normal dentition  RESPIRATORY: Normal respiratory effort; lungs are clear to auscultation bilaterally  CARDIOVASCULAR: Regular rate and rhythm, normal S1 and S2, no murmur/rub/gallop; Peripheral pulses are 2+ bilaterally  ABDOMEN: Nontender to palpation, normoactive bowel sounds, no rebound/guarding;   MUSCLOSKELETAL:  No clubbing or cyanosis of digits; no joint swelling or tenderness to palpation  PSYCH: A+O to person, place, and time; affect appropriate  NEUROLOGY: CN 2-12 are intact and symmetric; no gross sensory deficits;   SKIN: No rashes; no palpable lesions    LABS:                        9.3    6.30  )-----------( 157      ( 24 Aug 2022 05:34 )             30.7     08-24    145  |  108<H>  |  29.8<H>  ----------------------------<  91  3.3<L>   |  28.0  |  0.95    Ca    7.6<L>      24 Aug 2022 05:34  Phos  2.0     08-24  Mg     2.2     08-24                Culture - Blood (collected 21 Aug 2022 23:32)  Source: .Blood Blood-Peripheral  Preliminary Report (23 Aug 2022 07:01):    No growth to date.    Culture - Blood (collected 21 Aug 2022 23:30)  Source: .Blood Blood-Peripheral  Preliminary Report (23 Aug 2022 07:01):    No growth to date.      CAPILLARY BLOOD GLUCOSE            RADIOLOGY & ADDITIONAL TESTS:  Results Reviewed:   Imaging Personally Reviewed:  Electrocardiogram Personally Reviewed:                                          
[facility] Division of Hospital Medicine    Chief Complaint:    SOB    SUBJECTIVE / OVERNIGHT EVENTS:  No overnight events    Patient denies chest pain, SOB, abd pain, N/V, fever, chills, dysuria or any other complaints. All remainder ROS negative.     MEDICATIONS  (STANDING):  albuterol/ipratropium for Nebulization 3 milliLiter(s) Nebulizer every 8 hours  atorvastatin 20 milliGRAM(s) Oral at bedtime  diltiazem    Tablet 60 milliGRAM(s) Oral four times a day  donepezil 10 milliGRAM(s) Oral at bedtime  metoprolol tartrate 50 milliGRAM(s) Oral two times a day  mirtazapine 15 milliGRAM(s) Oral at bedtime  predniSONE   Tablet 5 milliGRAM(s) Oral daily  rivaroxaban 20 milliGRAM(s) Oral with dinner  traZODone 100 milliGRAM(s) Oral at bedtime    MEDICATIONS  (PRN):  metoprolol tartrate Injectable 5 milliGRAM(s) IV Push every 6 hours PRN hr > 130        I&O's Summary    25 Aug 2022 07:01  -  26 Aug 2022 07:00  --------------------------------------------------------  IN: 150 mL / OUT: 0 mL / NET: 150 mL        PHYSICAL EXAM:  Vital Signs Last 24 Hrs  T(C): 37.1 (26 Aug 2022 11:18), Max: 37.1 (26 Aug 2022 11:18)  T(F): 98.7 (26 Aug 2022 11:18), Max: 98.7 (26 Aug 2022 11:18)  HR: 81 (26 Aug 2022 11:18) (63 - 89)  BP: 150/80 (26 Aug 2022 11:18) (119/65 - 150/80)  BP(mean): --  RR: 19 (26 Aug 2022 11:18) (16 - 20)  SpO2: 96% (26 Aug 2022 11:18) (93% - 97%)    Parameters below as of 26 Aug 2022 11:18  Patient On (Oxygen Delivery Method): nasal cannula  O2 Flow (L/min): 6          CONSTITUTIONAL: NAD, elderly   ENMT: Moist oral mucosa, no pharyngeal injection or exudates; normal dentition  RESPIRATORY: Normal respiratory effort; lungs are coarse to auscultation bilaterally  CARDIOVASCULAR: Regular rate and rhythm, normal S1 and S2, no murmur/rub/gallop; Peripheral pulses are 2+ bilaterally  ABDOMEN: Nontender to palpation, normoactive bowel sounds, no rebound/guarding;   MUSCLOSKELETAL:  No clubbing or cyanosis of digits; no joint swelling or tenderness to palpation  PSYCH: A+O to person, place, and time; affect appropriate  NEUROLOGY: CN 2-12 are intact and symmetric; no gross sensory deficits;   SKIN: No rashes; no palpable lesions    LABS:                        10.0   9.28  )-----------( 176      ( 26 Aug 2022 06:51 )             32.9     08-26    146<H>  |  111<H>  |  27.7<H>  ----------------------------<  73  4.1   |  24.0  |  0.85    Ca    8.0<L>      26 Aug 2022 06:51  Phos  2.1     08-26  Mg     2.1     08-26                CAPILLARY BLOOD GLUCOSE            RADIOLOGY & ADDITIONAL TESTS:  Results Reviewed:   Imaging Personally Reviewed:  Electrocardiogram Personally Reviewed:                                          
[facility] Division of Hospital Medicine    Chief Complaint:    SOB    SUBJECTIVE / OVERNIGHT EVENTS:  Oxygenation improving   Still with significant airway debri, cough    Patient denies chest pain, abd pain, N/V, fever, chills, dysuria or any other complaints. All remainder ROS negative.     MEDICATIONS  (STANDING):  albuterol/ipratropium for Nebulization 3 milliLiter(s) Nebulizer every 8 hours  atorvastatin 20 milliGRAM(s) Oral at bedtime  azithromycin   Tablet 500 milliGRAM(s) Oral daily  cefTRIAXone   IVPB 1000 milliGRAM(s) IV Intermittent every 24 hours  diltiazem    Tablet 60 milliGRAM(s) Oral four times a day  donepezil 10 milliGRAM(s) Oral at bedtime  guaiFENesin  milliGRAM(s) Oral every 12 hours  metoprolol tartrate 50 milliGRAM(s) Oral two times a day  mirtazapine 15 milliGRAM(s) Oral at bedtime  predniSONE   Tablet 5 milliGRAM(s) Oral daily  rivaroxaban 20 milliGRAM(s) Oral with dinner  traZODone 100 milliGRAM(s) Oral at bedtime    MEDICATIONS  (PRN):  metoprolol tartrate Injectable 5 milliGRAM(s) IV Push every 6 hours PRN hr > 130        I&O's Summary    22 Aug 2022 07:01  -  23 Aug 2022 07:00  --------------------------------------------------------  IN: 0 mL / OUT: 1050 mL / NET: -1050 mL    23 Aug 2022 07:01  -  23 Aug 2022 11:25  --------------------------------------------------------  IN: 0 mL / OUT: 170 mL / NET: -170 mL        PHYSICAL EXAM:  Vital Signs Last 24 Hrs  T(C): 37.1 (23 Aug 2022 07:56), Max: 37.1 (23 Aug 2022 07:56)  T(F): 98.7 (23 Aug 2022 07:56), Max: 98.7 (23 Aug 2022 07:56)  HR: 64 (23 Aug 2022 08:00) (64 - 135)  BP: 136/69 (23 Aug 2022 08:00) (119/50 - 136/69)  BP(mean): 85 (23 Aug 2022 08:00) (66 - 97)  RR: 20 (23 Aug 2022 08:00) (19 - 24)  SpO2: 95% (23 Aug 2022 08:00) (92% - 99%)    Parameters below as of 23 Aug 2022 08:00  Patient On (Oxygen Delivery Method): nasal cannula  O2 Flow (L/min): 5          CONSTITUTIONAL: NAD, elderly  ENMT: Moist oral mucosa, no pharyngeal injection or exudates; normal dentition  RESPIRATORY: Normal respiratory effort; lungs are clear to auscultation bilaterally  CARDIOVASCULAR: Regular rate and rhythm, normal S1 and S2, no murmur/rub/gallop; Peripheral pulses are 2+ bilaterally  ABDOMEN: Nontender to palpation, normoactive bowel sounds, no rebound/guarding;   MUSCLOSKELETAL:  No clubbing or cyanosis of digits; no joint swelling or tenderness to palpation  PSYCH: A+O to person ; affect appropriate  NEUROLOGY: CN 2-12 are intact and symmetric; no gross sensory deficits;   SKIN: No rashes; no palpable lesions    LABS:                        9.7    8.25  )-----------( 156      ( 23 Aug 2022 06:20 )             31.1     08-23    145  |  106  |  32.6<H>  ----------------------------<  89  3.7   |  28.0  |  1.11    Ca    7.9<L>      23 Aug 2022 06:20  Phos  2.0     08-23  Mg     2.0     08-23    TPro  5.1<L>  /  Alb  2.4<L>  /  TBili  0.5  /  DBili  x   /  AST  19  /  ALT  11  /  AlkPhos  58  08-21              Culture - Blood (collected 21 Aug 2022 23:32)  Source: .Blood Blood-Peripheral  Preliminary Report (23 Aug 2022 07:01):    No growth to date.    Culture - Blood (collected 21 Aug 2022 23:30)  Source: .Blood Blood-Peripheral  Preliminary Report (23 Aug 2022 07:01):    No growth to date.    Culture - Urine (collected 20 Aug 2022 16:30)  Source: Catheterized Catheterized  Final Report (21 Aug 2022 23:01):    No growth      CAPILLARY BLOOD GLUCOSE            RADIOLOGY & ADDITIONAL TESTS:  Results Reviewed:   Imaging Personally Reviewed:  Electrocardiogram Personally Reviewed:                                          
[facility] Division of Hospital Medicine    Chief Complaint:    sob  enterovirus    SUBJECTIVE / OVERNIGHT EVENTS:  No events  continue to have difficulty moving airway debris    Patient denies chest pain, abd pain, N/V, fever, chills, dysuria or any other complaints. All remainder ROS negative.     MEDICATIONS  (STANDING):  albuterol/ipratropium for Nebulization 3 milliLiter(s) Nebulizer every 8 hours  atorvastatin 20 milliGRAM(s) Oral at bedtime  diltiazem    Tablet 60 milliGRAM(s) Oral four times a day  donepezil 10 milliGRAM(s) Oral at bedtime  guaiFENesin  milliGRAM(s) Oral every 12 hours  metoprolol tartrate 50 milliGRAM(s) Oral two times a day  mirtazapine 15 milliGRAM(s) Oral at bedtime  predniSONE   Tablet 5 milliGRAM(s) Oral daily  rivaroxaban 20 milliGRAM(s) Oral with dinner  traZODone 100 milliGRAM(s) Oral at bedtime    MEDICATIONS  (PRN):  metoprolol tartrate Injectable 5 milliGRAM(s) IV Push every 6 hours PRN hr > 130        I&O's Summary      PHYSICAL EXAM:  Vital Signs Last 24 Hrs  T(C): 36.6 (25 Aug 2022 10:15), Max: 36.9 (24 Aug 2022 16:29)  T(F): 97.8 (25 Aug 2022 10:15), Max: 98.4 (24 Aug 2022 16:29)  HR: 68 (25 Aug 2022 10:15) (68 - 88)  BP: 129/80 (25 Aug 2022 10:15) (126/69 - 137/68)  BP(mean): --  RR: 20 (25 Aug 2022 10:15) (18 - 20)  SpO2: 97% (25 Aug 2022 10:15) (90% - 100%)    Parameters below as of 25 Aug 2022 11:13  Patient On (Oxygen Delivery Method): nasal cannula,5 l            CONSTITUTIONAL: NAD, elderly  ENMT: Moist oral mucosa, no pharyngeal injection or exudates; normal dentition  RESPIRATORY: Normal respiratory effort; lungs are coarse with airway mucous to auscultation bilaterally  CARDIOVASCULAR: Regular rate and rhythm, normal S1 and S2, no murmur/rub/gallop; Peripheral pulses are 2+ bilaterally  ABDOMEN: Nontender to palpation, normoactive bowel sounds, no rebound/guarding;   MUSCLOSKELETAL:  No clubbing or cyanosis of digits; no joint swelling or tenderness to palpation  PSYCH: A+O to person, place, and time; affect appropriate  NEUROLOGY: CN 2-12 are intact and symmetric; no gross sensory deficits;   SKIN: No rashes; no palpable lesions    LABS:                        9.6    8.78  )-----------( 181      ( 25 Aug 2022 07:19 )             32.1     08-25    146<H>  |  109<H>  |  29.1<H>  ----------------------------<  85  4.0   |  27.0  |  0.87    Ca    8.0<L>      25 Aug 2022 07:19  Phos  2.2     08-25  Mg     2.2     08-25                CAPILLARY BLOOD GLUCOSE            RADIOLOGY & ADDITIONAL TESTS:  Results Reviewed:   Imaging Personally Reviewed:  Electrocardiogram Personally Reviewed:                                          
[facility] Division of Hospital Medicine    Chief Complaint:    SOB, Enterovirus PNA, AHRF    SUBJECTIVE / OVERNIGHT EVENTS:  Maintains oxygen requirement  with significant airway debris  Not tolerating Percussion Vest per RT    Patient denies chest pain, SOB, abd pain, N/V, fever, chills, dysuria or any other complaints. All remainder ROS negative.     MEDICATIONS  (STANDING):  albuterol/ipratropium for Nebulization 3 milliLiter(s) Nebulizer every 8 hours  atorvastatin 20 milliGRAM(s) Oral at bedtime  diltiazem    Tablet 60 milliGRAM(s) Oral four times a day  donepezil 10 milliGRAM(s) Oral at bedtime  metoprolol tartrate 50 milliGRAM(s) Oral two times a day  mirtazapine 15 milliGRAM(s) Oral at bedtime  predniSONE   Tablet 5 milliGRAM(s) Oral daily  rivaroxaban 20 milliGRAM(s) Oral with dinner  traZODone 100 milliGRAM(s) Oral at bedtime    MEDICATIONS  (PRN):  metoprolol tartrate Injectable 5 milliGRAM(s) IV Push every 6 hours PRN hr > 130        I&O's Summary    26 Aug 2022 07:01  -  27 Aug 2022 07:00  --------------------------------------------------------  IN: 270 mL / OUT: 0 mL / NET: 270 mL    27 Aug 2022 07:01  -  27 Aug 2022 14:58  --------------------------------------------------------  IN: 0 mL / OUT: 400 mL / NET: -400 mL        PHYSICAL EXAM:  Vital Signs Last 24 Hrs  T(C): 36.9 (27 Aug 2022 11:48), Max: 36.9 (27 Aug 2022 11:48)  T(F): 98.4 (27 Aug 2022 11:48), Max: 98.4 (27 Aug 2022 11:48)  HR: 84 (27 Aug 2022 11:48) (71 - 137)  BP: 160/78 (27 Aug 2022 11:48) (143/71 - 160/78)  BP(mean): --  RR: 20 (27 Aug 2022 11:48) (19 - 20)  SpO2: 100% (27 Aug 2022 11:48) (93% - 100%)    Parameters below as of 27 Aug 2022 11:48  Patient On (Oxygen Delivery Method): nasal cannula  O2 Flow (L/min): 6          CONSTITUTIONAL: NAD, elderly  ENMT: Moist oral mucosa, no pharyngeal injection or exudates; normal dentition  RESPIRATORY: Normal respiratory effort; lungs are clear to auscultation bilaterally  CARDIOVASCULAR: Regular rate and rhythm, normal S1 and S2, no murmur/rub/gallop; Peripheral pulses are 2+ bilaterally  ABDOMEN: Nontender to palpation, normoactive bowel sounds, no rebound/guarding;   MUSCLOSKELETAL:  No clubbing or cyanosis of digits; no joint swelling or tenderness to palpation  PSYCH: A+O to person, place, and time; affect appropriate  NEUROLOGY: CN 2-12 are intact and symmetric; no gross sensory deficits;   SKIN: No rashes; no palpable lesions    LABS:                        10.2   10.31 )-----------( 230      ( 27 Aug 2022 06:06 )             34.3     08-27    145  |  108<H>  |  26.0<H>  ----------------------------<  86  4.0   |  26.0  |  1.02    Ca    8.1<L>      27 Aug 2022 06:06  Phos  2.4     08-27  Mg     2.1     08-27                CAPILLARY BLOOD GLUCOSE            RADIOLOGY & ADDITIONAL TESTS:  Results Reviewed:   Imaging Personally Reviewed:  Electrocardiogram Personally Reviewed:                                          
[facility] Division of Hospital Medicine    Chief Complaint:    Fall, SOB  SUBJECTIVE / OVERNIGHT EVENTS:  Admitted overnight Continued on BIPAP. Given prn IV bb for aflutter overnight  now rate controlled, still coughing     Patient denies chest pain, abd pain, N/V, fever, chills, dysuria or any other complaints. All remainder ROS negative.     MEDICATIONS  (STANDING):  albuterol/ipratropium for Nebulization 3 milliLiter(s) Nebulizer every 8 hours  atorvastatin 20 milliGRAM(s) Oral at bedtime  azithromycin   Tablet 500 milliGRAM(s) Oral daily  cefTRIAXone   IVPB 1000 milliGRAM(s) IV Intermittent every 24 hours  diltiazem    Tablet 60 milliGRAM(s) Oral four times a day  donepezil 10 milliGRAM(s) Oral at bedtime  furosemide   Injectable 40 milliGRAM(s) IV Push daily  guaiFENesin  milliGRAM(s) Oral every 12 hours  metoprolol succinate ER 50 milliGRAM(s) Oral daily  mirtazapine 15 milliGRAM(s) Oral at bedtime  predniSONE   Tablet 5 milliGRAM(s) Oral daily  rivaroxaban 20 milliGRAM(s) Oral with dinner  traZODone 100 milliGRAM(s) Oral at bedtime    MEDICATIONS  (PRN):  metoprolol tartrate Injectable 5 milliGRAM(s) IV Push every 6 hours PRN hr > 130        I&O's Summary    20 Aug 2022 07:01  -  21 Aug 2022 07:00  --------------------------------------------------------  IN: 0 mL / OUT: 1000 mL / NET: -1000 mL        PHYSICAL EXAM:  Vital Signs Last 24 Hrs  T(C): 36.8 (21 Aug 2022 08:00), Max: 37.2 (20 Aug 2022 21:00)  T(F): 98.2 (21 Aug 2022 08:00), Max: 99 (20 Aug 2022 21:00)  HR: 67 (21 Aug 2022 08:00) (67 - 138)  BP: 112/57 (21 Aug 2022 08:00) (105/52 - 177/91)  BP(mean): 69 (21 Aug 2022 08:00) (64 - 91)  RR: 16 (21 Aug 2022 08:00) (16 - 30)  SpO2: 100% (21 Aug 2022 08:00) (92% - 100%)    Parameters below as of 21 Aug 2022 08:00  Patient On (Oxygen Delivery Method): BiPAP/CPAP  O2 Flow (L/min): 2  O2 Concentration (%): 40        CONSTITUTIONAL: NAD, elderly  ENMT: Moist oral mucosa, no pharyngeal injection or exudates; normal dentition  RESPIRATORY: Normal respiratory effort; lungs are coarse to auscultation bilaterally, wet cough   CARDIOVASCULAR: Regular rate and rhythm, normal S1 and S2, no murmur/rub/gallop; Peripheral pulses are 2+ bilaterally  ABDOMEN: Nontender to palpation, normoactive bowel sounds, no rebound/guarding;   MUSCLOSKELETAL:  No clubbing or cyanosis of digits; no joint swelling or tenderness to palpation  PSYCH: A+O to person, place; affect appropriate  NEUROLOGY: CN 2-12 are intact and symmetric; no gross sensory deficits;   SKIN: No rashes; no palpable lesions    LABS:                        11.1   14.73 )-----------( 172      ( 21 Aug 2022 07:50 )             35.4     08-21    143  |  105  |  21.9<H>  ----------------------------<  137<H>  4.1   |  26.0  |  1.31<H>    Ca    8.2<L>      21 Aug 2022 07:50  Phos  3.6     08-  Mg     1.8     -21    TPro  5.6<L>  /  Alb  2.6<L>  /  TBili  0.6  /  DBili  x   /  AST  36  /  ALT  13  /  AlkPhos  68  08-20    PT/INR - ( 20 Aug 2022 10:35 )   PT: 16.7 sec;   INR: 1.43 ratio         PTT - ( 20 Aug 2022 10:35 )  PTT:29.3 sec  CARDIAC MARKERS ( 20 Aug 2022 13:49 )  x     / 0.02 ng/mL / x     / x     / x      CARDIAC MARKERS ( 20 Aug 2022 09:30 )  x     / 0.01 ng/mL / x     / x     / x          Urinalysis Basic - ( 20 Aug 2022 16:30 )    Color: Yellow / Appearance: Clear / S.015 / pH: x  Gluc: x / Ketone: Negative  / Bili: Negative / Urobili: Negative mg/dL   Blood: x / Protein: Negative / Nitrite: Negative   Leuk Esterase: Trace / RBC: 0-2 /HPF / WBC 0-2 /HPF   Sq Epi: x / Non Sq Epi: Occasional / Bacteria: x        CAPILLARY BLOOD GLUCOSE            RADIOLOGY & ADDITIONAL TESTS:  Results Reviewed:   Imaging Personally Reviewed:  Electrocardiogram Personally Reviewed:

## 2022-08-31 NOTE — DISCHARGE NOTE PROVIDER - NSDCMRMEDTOKEN_GEN_ALL_CORE_FT
albuterol 90 mcg/inh inhalation aerosol: 2 puff(s) inhaled 4 times a day, As needed, Shortness of Breath and/or Wheezing  atorvastatin 20 mg oral tablet: 1 tab(s) orally once a day  Cardizem 60 mg oral tablet: 1 tab(s) orally 4 times a day  donepezil 10 mg oral tablet: 1 tab(s) orally once a day (at bedtime)  Gemtesa 75 mg oral tablet: 1 tab(s) orally once a day  mirtazapine 7.5 mg oral tablet: 2 tab(s) orally once a day (at bedtime)  predniSONE 5 mg oral tablet: 1 tab(s) orally once a day  traZODone 100 mg oral tablet: 1 tab(s) orally once a day (at bedtime)   albuterol 90 mcg/inh inhalation aerosol: 2 puff(s) inhaled 4 times a day, As needed, Shortness of Breath and/or Wheezing  atorvastatin 20 mg oral tablet: 1 tab(s) orally once a day  Cardizem 60 mg oral tablet: 1 tab(s) orally 4 times a day  donepezil 10 mg oral tablet: 1 tab(s) orally once a day (at bedtime)  Gemtesa 75 mg oral tablet: 1 tab(s) orally once a day  metoprolol tartrate 50 mg oral tablet: 1 tab(s) orally 2 times a day  mirtazapine 7.5 mg oral tablet: 2 tab(s) orally once a day (at bedtime)  predniSONE 5 mg oral tablet: 1 tab(s) orally once a day  rivaroxaban 20 mg oral tablet: 1 tab(s) orally once a day (before a meal)  traZODone 100 mg oral tablet: 1 tab(s) orally once a day (at bedtime)

## 2022-08-31 NOTE — DISCHARGE NOTE PROVIDER - NSDCFUADDAPPT_GEN_ALL_CORE_FT
PHARMACY:  [facility]  THE PT. DOES NOT HAVE ANY DIFFICULTY IN OBTAINING  OR TAKING HIS MEDICATIONS  DECLINED VIVO MEDS TO BED    PULMONARY FOLLOW UP APPOINTMENT SCHEDULED  WITH DR. NG ON 9/14/2022 AT 10:30 AM  IF YOU ARE UNABLE TO ATTEND YOUR PRESCHEDULED APPOINTMENT,  PLEASE CONTACT THE OFFICE DIRECTLY -[phone] TO RESCHEDULE

## 2022-08-31 NOTE — PROGRESS NOTE ADULT - REASON FOR ADMISSION
Acute Respiratory Failure  Viral PNA
SOB
Acute Respiratory Failure  Viral PNA

## 2022-08-31 NOTE — DISCHARGE NOTE PROVIDER - EXTENDED VTE YES NO FOR MLM ASPIRIN
Bed: 12  Expected date: 5/12/17  Expected time: 10:05 AM  Means of arrival: [facility]  Comments:  87 y/o F with unresponsive episode.  R sided facial droop, weakness.  VS 96/45, 54, 12.  VW=069.  IV 20 G L FA.  
,

## 2022-08-31 NOTE — DISCHARGE NOTE PROVIDER - ATTENDING DISCHARGE PHYSICAL EXAMINATION:
Vital Signs Last 24 Hrs  T(F): 98.6 (31 Aug 2022 11:40), Max: 98.9 (30 Aug 2022 17:27)  HR: 85 (31 Aug 2022 16:30) (71 - 89)  BP: 158/89 (31 Aug 2022 11:40) (119/56 - 158/89)  RR: 18 (31 Aug 2022 11:40) (16 - 20)  SpO2: 94% (31 Aug 2022 16:30) (90% - 95%)    Physical Exam:  Constitutional: NAD  HEENT: NC/AT, PERRL, EOMI, trachea midline, no JVD  Respiratory: CTA bilaterally, symmetrical chest rise  Cardiovascular: RRR, no m/g/r  Gastrointestinal: Soft, NT/ND, BS+  Vascular: 2+ peripheral pulses  Neurological: A/O x 3, no focal neurological deficits  Psych: Fair mood/affect  Musculoskeletal: No edema, cyanosis, deformities. ROM normal  Skin: No obvious rash, lesions. No jaundice.

## 2022-08-31 NOTE — DISCHARGE NOTE PROVIDER - NSDCCPCAREPLAN_GEN_ALL_CORE_FT
PRINCIPAL DISCHARGE DIAGNOSIS  Diagnosis: Pneumonia due to virus  Assessment and Plan of Treatment: - You completed antibiotics during your hospitalization   - Nebs as needed  - Continue mucinex as needed  - Supplemental oxygen at 3LNC as needed  - Follow up with PCP in 1 week      SECONDARY DISCHARGE DIAGNOSES  Diagnosis: Deep vein thrombosis (DVT) of upper extremity  Assessment and Plan of Treatment: - Continue xarelto as directed   - Follow up with PCP in 1 week    Diagnosis: Atrial flutter  Assessment and Plan of Treatment: - Continue metoprolol and cardizem as directed  - Follow up with Cardiology in 1 week    Diagnosis: Rheumatoid arthritis  Assessment and Plan of Treatment: - Continue prednisone as directed  - Follow up with Rheumaotogist as per routine    Diagnosis: Dementia  Assessment and Plan of Treatment: - Continue donepezil as directed  - Follow up with PCP in 1 week

## 2022-08-31 NOTE — PROGRESS NOTE ADULT - ASSESSMENT
89yr old M w/a PMH of Dementia, HTN, HLD, prostate CA, RA, hard of hearing, RUE DVT, Cellulitis presents from [facility] after a fall noted to have tachycardia, increased wob with hypoxia, edema, elevated prbnp, + enterovirus admitted to stepdown unit for AHRF in setting of CHF exacerbation and Viral PNA.    Acute Resp Failure with hypoxia  AOC CHF exacerbation likely systolic  Viral PNA / Enterovirus with significant mucous plugging  Left pleural effusion  Sepsis present on admission - tachycardia, leukocytosis  S/p Lasix  Strict I/O   CTX/Azithro through 8/24   DUONEBS  trend wbc and white count   cont mucinex   Chest PT - Requested Vest    RUE DVT  Cont Xarelto - transition to daily dosing     CHF  Aflutter  RVR  Cont home metop and dilt  Prn Metop IVP    RA  Patient has been on long-term prednisone. Per son many years. Does not have a rheumatologist. Unclear if patient was attempted to be weaned off  Will cont Prednisone for now to prevent adrenal insufficiency.     Dementia   Cont donepezil  SLP eval as difficulty with liquids on bedside swallow eval  Diet: Mod thick liquids    DVT ppx: Xarelto  Dispo:   Med tele  CODE Status: DNR/DNI  Good is son/hcp - [phone]   Daughter in Law     Dispo: [facility] 
89yr old M w/a PMH of Dementia, HTN, HLD, prostate CA, RA, hard of hearing, RUE DVT, Cellulitis presents from [facility] home after a fall noted to have tachycardia, increased wob with hypoxia, edema, elevated prbnp, + enterovirus admitted to stepdown unit for AHRF in setting of CHF exacerbation and Viral PNA.    Acute Resp Failure with hypoxia  AOC CHF exacerbation likely systolic  Viral PNA / Enterovirus with significant mucous plugging  Left pleural effusion  Sepsis present on admission - tachycardia, leukocytosis  S/p Lasix  Strict I/O   s/p CTX/Azithro   DUONEBS  trend wbc and white count   cont mucinex   Chest PT - Requested Vest  Needs ongoing Chest PT. Discussed with RT    RUE DVT  Cont Xarelto - transition to daily dosing     CHF  Aflutter  RVR  Cont home metop and dilt  Prn Metop IVP    RA  Patient has been on long-term prednisone. Per son many years. Does not have a rheumatologist. Unclear if patient was attempted to be weaned off  Will cont Prednisone for now to prevent adrenal insufficiency.     Dementia   Cont donepezil  SLP eval as difficulty with liquids on bedside swallow eval  Diet: Mod thick liquids    DVT ppx: Xarelto  Dispo:   Med tele  CODE Status: DNR/DNI  Good is son/hcp - [phone]   Daughter in Law     Dispo:   Pending clinical course. Assisted living with oxygen vs [facility].  Can consider transition to [facility] whilst patient continues to improve from viral pna and then transition back to assst living,   
89yr old M w/a PMH of Dementia, HTN, HLD, prostate CA, RA, hard of hearing, RUE DVT, Cellulitis presents from [facility] after a fall noted to have tachycardia, increased wob with hypoxia, edema, elevated prbnp, + enterovirus admitted to stepdown unit for AHRF in setting of CHF exacerbation and Viral PNA.    Acute Resp Failure with hypoxia  AOC CHF exacerbation likely systolic  Viral PNA / Enterovirus with significant mucous plugging  Left pleural effusion  Sepsis present on admission - tachycardia, leukocytosis  - S/p Lasix  - Strict I/O   - s/p CTX/Azithro   - DUONEBS  - trend wbc and white count   - cont mucinex   - Chest PT - Requested Vest but patient not tolerating  - Supplemental oxygen, wean as tolerated. Currently on 4L NC.    RUE DVT  - Cont Xarelto - transition to daily dosing     CHF, Aflutter w/ RVR  - Cont home metop and dilt  - Lopressor IVP prn    RA  - Patient has been on long-term prednisone. Per son many years. Does not have a rheumatologist. Unclear if patient was attempted to be weaned off  - Will cont Prednisone for now to prevent adrenal insufficiency.     Dementia   - Cont donepezil  - SLP eval as difficulty with liquids on bedside swallow eval  - Diet: Mod thick liquids    DVT ppx: Xarelto  Dispo:   Med tele  CODE Status: DNR/DNI  Good is son/hcp - [phone]   Daughter in Law     Dispo:   Pending clinical course. Son prefers patient to go to assisted living rather than [facility]. Possible discharge today, awaiting home care. 
89yr old M w/a PMH of Dementia, HTN, HLD, prostate CA, RA, hard of hearing, RUE DVT, Cellulitis presents from [facility] after a fall noted to have tachycardia, increased wob with hypoxia, edema, elevated prbnp, + enterovirus admitted to stepdown unit for AHRF in setting of CHF exacerbation and Viral PNA.    Acute Resp Failure with hypoxia  AOC CHF exacerbation likely systolic  Viral PNA / Enterovirus with significant mucous plugging  Left pleural effusion  Sepsis present on admission - tachycardia, leukocytosis  IV lasix  Strict I/O   TTE pending   Albarran for now for acute monitoring   CTZ/Azithro  DUONEBS  Currently on BIPAP - Mental status improved compared ot presentation per son. Can consider weaning from BIPAP and trial of NC or venturi or HFNC  Supportive care  trend wbc and white count   Add mucinex   wean off bipap today    RUE DVT  Cont Xarelto - transition to daily dosing     CHF  Aflutter  Cont home metop and dilt    RA  Patient has been on long-term prednisone. Per son many years. Does not have a rheumatologist. Unclear if patient was attempted to be weaned off  Will cont Prednisone for now to prevent adrenal insufficiency.     Dementia   Cont donepezil    DVT ppx: Xarelto  Dispo: Stepdown Level of Care  CODE Status: DNR/DNI  Good is son/hcp - [phone]   Daughter in Law   
89yr old M w/a PMH of Dementia, HTN, HLD, prostate CA, RA, hard of hearing, RUE DVT, Cellulitis presents from [facility] after a fall noted to have tachycardia, increased wob with hypoxia, edema, elevated prbnp, + enterovirus admitted to stepdown unit for AHRF in setting of CHF exacerbation and Viral PNA.    Acute Resp Failure with hypoxia  AOC CHF exacerbation likely systolic  Viral PNA / Enterovirus with significant mucous plugging  Left pleural effusion  Sepsis present on admission - tachycardia, leukocytosis  - S/p Lasix  - Strict I/O   - s/p CTX/Azithro   - DUONEBS  - trend wbc and white count   - cont mucinex   - Chest PT - Requested Vest but patient not tolerating  - Supplemental oxygen, wean as tolerated. Currently on 4L NC.    RUE DVT  - Cont Xarelto - transition to daily dosing     CHF, Aflutter w/ RVR  - Cont home metop and dilt  - Lopressor IVP prn    RA  - Patient has been on long-term prednisone. Per son many years. Does not have a rheumatologist. Unclear if patient was attempted to be weaned off  - Will cont Prednisone for now to prevent adrenal insufficiency.     Dementia   - Cont donepezil  - SLP eval as difficulty with liquids on bedside swallow eval  - Diet: Mod thick liquids    DVT ppx: Xarelto  Dispo:   Med tele  CODE Status: DNR/DNI  Good is son/hcp - [phone]   Daughter in Law     Dispo:   Pending clinical course. Son prefers patient to go to assisted living rather than [facility]. Possible discharge tomorrow, awaiting home care. 
89yr old M w/a PMH of Dementia, HTN, HLD, prostate CA, RA, hard of hearing, RUE DVT, Cellulitis presents from [facility] home after a fall noted to have tachycardia, increased wob with hypoxia, edema, elevated prbnp, + enterovirus admitted to stepdown unit for AHRF in setting of CHF exacerbation and Viral PNA.    Acute Resp Failure with hypoxia  AOC CHF exacerbation likely systolic  Viral PNA / Enterovirus with significant mucous plugging  Left pleural effusion  Sepsis present on admission - tachycardia, leukocytosis  S/p Lasix  Strict I/O   s/p CTX/Azithro   DUONEBS  trend wbc and white count   cont mucinex   Chest PT - Requested Vest  Needs ongoing Chest PT. Discussed with RT    RUE DVT  Cont Xarelto - transition to daily dosing     CHF  Aflutter  RVR  Cont home metop and dilt  Prn Metop IVP    RA  Patient has been on long-term prednisone. Per son many years. Does not have a rheumatologist. Unclear if patient was attempted to be weaned off  Will cont Prednisone for now to prevent adrenal insufficiency.     Dementia   Cont donepezil  SLP eval as difficulty with liquids on bedside swallow eval  Diet: Mod thick liquids    DVT ppx: Xarelto  Dispo:   Med tele  CODE Status: DNR/DNI  Good is son/hcp - [phone]   Daughter in Law     Dispo:   Pending clinical course. Assisted living with oxygen vs [facility].  Can consider transition to [facility] whilst patient continues to improve from viral pna and then transition back to assst living, 
89yr old M w/a PMH of Dementia, HTN, HLD, prostate CA, RA, hard of hearing, RUE DVT, Cellulitis presents from [facility] after a fall noted to have tachycardia, increased wob with hypoxia, edema, elevated prbnp, + enterovirus admitted to stepdown unit for AHRF in setting of CHF exacerbation and Viral PNA.    Acute Resp Failure with hypoxia  AOC CHF exacerbation likely systolic  Viral PNA / Enterovirus with significant mucous plugging  Left pleural effusion  Sepsis present on admission - tachycardia, leukocytosis  S/p Lasix  Strict I/O   s/p CTX/Azithro   DUONEBS  trend wbc and white count   cont mucinex   Chest PT - Requested Vest but patient not tolerating  Still requiring 6l NC at rest     RUE DVT  Cont Xarelto - transition to daily dosing     CHF  Aflutter  RVR  Cont home metop and dilt  Prn Metop IVP    RA  Patient has been on long-term prednisone. Per son many years. Does not have a rheumatologist. Unclear if patient was attempted to be weaned off  Will cont Prednisone for now to prevent adrenal insufficiency.     Dementia   Cont donepezil  SLP eval as difficulty with liquids on bedside swallow eval  Diet: Mod thick liquids    DVT ppx: Xarelto  Dispo:   Med tele  CODE Status: DNR/DNI  Good is son/hcp -    Daughter in Law     Dispo:   Pending clinical course. Son prefers patient to go to assisted living rather than [facility]. Assisted living with oxygen vs [facility].  Can consider transition to [facility] whilst patient continues to improve from viral pna and then transition back to assst living.  At this time requiring 6lnc which is aggressive rate for home oxygen.   
89yr old M w/a PMH of Dementia, HTN, HLD, prostate CA, RA, hard of hearing, RUE DVT, Cellulitis presents from [facility] home after a fall noted to have tachycardia, increased wob with hypoxia, edema, elevated prbnp, + enterovirus admitted to stepdown unit for AHRF in setting of CHF exacerbation and Viral PNA.    Acute Resp Failure with hypoxia  AOC CHF exacerbation likely systolic  Viral PNA / Enterovirus with significant mucous plugging  Left pleural effusion  Sepsis present on admission - tachycardia, leukocytosis  Oral lasix. Can likely hold tomorrow  Strict I/O   TOV   CTX/Azithro  DUONEBS  trend wbc and white count   cont mucinex   Chest PT    RUE DVT  Cont Xarelto - transition to daily dosing     CHF  Aflutter  RVR  Cont home metop and dilt  Prn Metop IVP  Will discuss regiment with cardiology.  Discussed with [facility] cardiology to continue ccb and bb for now.    RA  Patient has been on long-term prednisone. Per son many years. Does not have a rheumatologist. Unclear if patient was attempted to be weaned off  Will cont Prednisone for now to prevent adrenal insufficiency.     Dementia   Cont donepezil  SLP eval as difficulty with liquids on bedside swallow eval  Diet: Purees no liquids pending formal eval    DVT ppx: Xarelto  Dispo:   Med tele  CODE Status: DNR/DNI  Good is son/hcp - [phone]   Daughter in Law         
89yr old M w/a PMH of Dementia, HTN, HLD, prostate CA, RA, hard of hearing, RUE DVT, Cellulitis presents from [facility] after a fall noted to have tachycardia, increased wob with hypoxia, edema, elevated prbnp, + enterovirus admitted to stepdown unit for AHRF in setting of CHF exacerbation and Viral PNA.    Acute Resp Failure with hypoxia  AOC CHF exacerbation likely systolic  Viral PNA / Enterovirus with significant mucous plugging  Left pleural effusion  Sepsis present on admission - tachycardia, leukocytosis  S/p Lasix  Strict I/O   s/p CTX/Azithro   DUONEBS  trend wbc and white count   cont mucinex   Chest PT - Requested Vest but patient not tolerating  Still requiring 6l NC at rest     RUE DVT  Cont Xarelto - transition to daily dosing     CHF  Aflutter  RVR  Cont home metop and dilt  Prn Metop IVP    RA  Patient has been on long-term prednisone. Per son many years. Does not have a rheumatologist. Unclear if patient was attempted to be weaned off  Will cont Prednisone for now to prevent adrenal insufficiency.     Dementia   Cont donepezil  SLP eval as difficulty with liquids on bedside swallow eval  Diet: Mod thick liquids    DVT ppx: Xarelto  Dispo:   Med tele  CODE Status: DNR/DNI  Good is son/hcp -    Daughter in Law     Dispo:   Pending clinical course. Son prefers patient to go to assisted living rather than [facility]. Assisted living with oxygen vs [facility].  Can consider transition to [facility] whilst patient continues to improve from viral pna and then transition back to assst living.  At this time requiring 6lnc which is aggressive rate for home oxygen.   
89yr old M w/a PMH of Dementia, HTN, HLD, prostate CA, RA, hard of hearing, RUE DVT, Cellulitis presents from [facility] after a fall noted to have tachycardia, increased wob with hypoxia, edema, elevated prbnp, + enterovirus admitted to stepdown unit for AHRF in setting of CHF exacerbation and Viral PNA.    Acute Resp Failure with hypoxia  AOC CHF exacerbation likely systolic  Viral PNA / Enterovirus with significant mucous plugging  Left pleural effusion  Sepsis present on admission - tachycardia, leukocytosis  IV lasix to oral lasix  Strict I/O   TTE pending   Albarran for now for acute monitoring    CTX/Azithro  DUONEBS  NC currently - oxygenating high 80's to low 90's  Supportive care  trend wbc and white count   cont mucinex   Chest PT    RUE DVT  Cont Xarelto - transition to daily dosing     CHF  Aflutter  RVR  Cont home metop and dilt  Prn Metop IVP  Will discuss regiment with cardiology. (patient prescribed bb and ccb at home)    RA  Patient has been on long-term prednisone. Per son many years. Does not have a rheumatologist. Unclear if patient was attempted to be weaned off  Will cont Prednisone for now to prevent adrenal insufficiency.     Dementia   Cont donepezil  SLP eval as difficulty with liquids on bedside swallow eval  Diet: Purees no liquids pending formal eval    DVT ppx: Xarelto  Dispo: Stepdown Level of Care  CODE Status: DNR/DNI  Good is son/hcp - [phone]   Daughter in Law       
89yr old M w/a PMH of Dementia, HTN, HLD, prostate CA, RA, hard of hearing, RUE DVT, Cellulitis presents from [facility] after a fall noted to have tachycardia, increased wob with hypoxia, edema, elevated prbnp, + enterovirus admitted to stepdown unit for AHRF in setting of CHF exacerbation and Viral PNA.    Acute Resp Failure with hypoxia  AOC CHF exacerbation likely systolic  Viral PNA / Enterovirus with significant mucous plugging  Left pleural effusion  Sepsis present on admission - tachycardia, leukocytosis  - S/p Lasix  - Strict I/O   - s/p CTX/Azithro   - DUONEBS  - trend wbc and white count   - cont mucinex   - Chest PT - Requested Vest but patient not tolerating  - Supplemental oxygen, wean as tolerated    RUE DVT  - Cont Xarelto - transition to daily dosing     CHF, Aflutter w/ RVR  - Cont home metop and dilt  - Lopressor IVP prn    RA  - Patient has been on long-term prednisone. Per son many years. Does not have a rheumatologist. Unclear if patient was attempted to be weaned off  - Will cont Prednisone for now to prevent adrenal insufficiency.     Dementia   - Cont donepezil  - SLP eval as difficulty with liquids on bedside swallow eval  - Diet: Mod thick liquids    DVT ppx: Xarelto  Dispo:   Med tele  CODE Status: DNR/DNI  Good is son/hcp - [phone]   Daughter in Law     Dispo:   Pending clinical course. Son prefers patient to go to assisted living rather than [facility]. Assisted living with oxygen vs [facility].  Can consider transition to [facility] whilst patient continues to improve from viral pna and then transition back to assst living.  At this time requiring 6lnc which is aggressive rate for home oxygen.

## 2022-09-01 ENCOUNTER — APPOINTMENT (OUTPATIENT)
Dept: CARE COORDINATION | Facility: HOME HEALTH | Age: 87
End: 2022-09-01

## 2022-09-01 VITALS
SYSTOLIC BLOOD PRESSURE: 141 MMHG | DIASTOLIC BLOOD PRESSURE: 80 MMHG | RESPIRATION RATE: 19 BRPM | HEART RATE: 85 BPM | TEMPERATURE: 97.3 F | OXYGEN SATURATION: 98 %

## 2022-09-01 DIAGNOSIS — R60.0 LOCALIZED EDEMA: ICD-10-CM

## 2022-09-01 DIAGNOSIS — Z74.1 NEED FOR ASSISTANCE WITH PERSONAL CARE: ICD-10-CM

## 2022-09-01 DIAGNOSIS — G47.00 INSOMNIA, UNSPECIFIED: ICD-10-CM

## 2022-09-01 DIAGNOSIS — H91.90 UNSPECIFIED HEARING LOSS, UNSPECIFIED EAR: ICD-10-CM

## 2022-09-01 DIAGNOSIS — C61 MALIGNANT NEOPLASM OF PROSTATE: ICD-10-CM

## 2022-09-01 DIAGNOSIS — R09.89 OTHER SPECIFIED SYMPTOMS AND SIGNS INVOLVING THE CIRCULATORY AND RESPIRATORY SYSTEMS: ICD-10-CM

## 2022-09-01 DIAGNOSIS — J12.9 VIRAL PNEUMONIA, UNSPECIFIED: ICD-10-CM

## 2022-09-01 DIAGNOSIS — E78.5 HYPERLIPIDEMIA, UNSPECIFIED: ICD-10-CM

## 2022-09-01 DIAGNOSIS — Z29.9 ENCOUNTER FOR PROPHYLACTIC MEASURES, UNSPECIFIED: ICD-10-CM

## 2022-09-01 DIAGNOSIS — Z99.3 DEPENDENCE ON WHEELCHAIR: ICD-10-CM

## 2022-09-01 DIAGNOSIS — I48.91 UNSPECIFIED ATRIAL FIBRILLATION: ICD-10-CM

## 2022-09-01 DIAGNOSIS — I82.629 ACUTE EMBOLISM AND THROMBOSIS OF DEEP VEINS OF UNSPECIFIED UPPER EXTREMITY: ICD-10-CM

## 2022-09-01 DIAGNOSIS — M06.9 RHEUMATOID ARTHRITIS, UNSPECIFIED: ICD-10-CM

## 2022-09-01 DIAGNOSIS — N32.81 OVERACTIVE BLADDER: ICD-10-CM

## 2022-09-01 DIAGNOSIS — I10 ESSENTIAL (PRIMARY) HYPERTENSION: ICD-10-CM

## 2022-09-01 DIAGNOSIS — F03.90 UNSPECIFIED DEMENTIA W/OUT BEHAVIORAL DISTURBANCE: ICD-10-CM

## 2022-09-01 PROCEDURE — 99496 TRANSJ CARE MGMT HIGH F2F 7D: CPT

## 2022-09-01 RX ORDER — IPRATROPIUM BROMIDE AND ALBUTEROL SULFATE 2.5; .5 MG/3ML; MG/3ML
0.5-2.5 (3) SOLUTION RESPIRATORY (INHALATION) 4 TIMES DAILY
Qty: 180 | Refills: 0 | Status: ACTIVE | COMMUNITY
Start: 2022-09-01

## 2022-09-01 RX ORDER — ATORVASTATIN CALCIUM 20 MG/1
20 TABLET, FILM COATED ORAL
Qty: 1 | Refills: 3 | Status: ACTIVE | COMMUNITY
Start: 2022-09-01

## 2022-09-01 RX ORDER — DONEPEZIL HYDROCHLORIDE 10 MG/1
10 TABLET ORAL DAILY
Refills: 0 | Status: ACTIVE | COMMUNITY
Start: 2022-09-01

## 2022-09-01 RX ORDER — RIVAROXABAN 20 MG/1
20 TABLET, FILM COATED ORAL
Qty: 90 | Refills: 3 | Status: ACTIVE | COMMUNITY
Start: 2022-09-01

## 2022-09-01 RX ORDER — DOXYCYCLINE HYCLATE 100 MG/1
100 TABLET ORAL
Qty: 14 | Refills: 0 | Status: ACTIVE | COMMUNITY
Start: 2022-09-01 | End: 1900-01-01

## 2022-09-01 RX ORDER — PANTOPRAZOLE 40 MG/1
40 TABLET, DELAYED RELEASE ORAL DAILY
Qty: 30 | Refills: 1 | Status: ACTIVE | COMMUNITY
Start: 2022-09-01 | End: 1900-01-01

## 2022-09-01 RX ORDER — TRAZODONE HYDROCHLORIDE 100 MG/1
100 TABLET ORAL
Refills: 0 | Status: ACTIVE | COMMUNITY
Start: 2022-09-01

## 2022-09-01 RX ORDER — MIRTAZAPINE 7.5 MG/1
7.5 TABLET, FILM COATED ORAL AT BEDTIME
Refills: 0 | Status: ACTIVE | COMMUNITY
Start: 2022-09-01

## 2022-09-01 RX ORDER — DILTIAZEM HYDROCHLORIDE 60 MG/1
60 TABLET ORAL 4 TIMES DAILY
Refills: 0 | Status: ACTIVE | COMMUNITY
Start: 2022-09-01

## 2022-09-01 RX ORDER — GUAIFENESIN 600 MG/1
600 TABLET, EXTENDED RELEASE ORAL
Refills: 0 | Status: ACTIVE | COMMUNITY
Start: 2022-09-01

## 2022-09-01 RX ORDER — PREDNISONE 10 MG/1
10 TABLET ORAL
Qty: 40 | Refills: 6 | Status: ACTIVE | COMMUNITY
Start: 2022-09-01 | End: 1900-01-01

## 2022-09-01 RX ORDER — VIBEGRON 75 MG/1
75 TABLET, FILM COATED ORAL DAILY
Refills: 0 | Status: ACTIVE | COMMUNITY
Start: 2022-09-01

## 2022-09-01 RX ORDER — METOPROLOL TARTRATE 50 MG/1
50 TABLET, FILM COATED ORAL
Qty: 180 | Refills: 1 | Status: ACTIVE | COMMUNITY
Start: 2022-09-01

## 2022-09-06 PROBLEM — M06.9 RHEUMATOID ARTHRITIS: Status: ACTIVE | Noted: 2022-09-01

## 2022-09-06 PROBLEM — Z74.1 REQUIRES ASSISTANCE WITH ACTIVITIES OF DAILY LIVING (ADL): Status: ACTIVE | Noted: 2022-09-01

## 2022-09-06 PROBLEM — N32.81 OVERACTIVE BLADDER: Status: ACTIVE | Noted: 2022-09-01

## 2022-09-06 PROBLEM — Z29.9 PROPHYLACTIC MEASURE: Status: ACTIVE | Noted: 2022-09-01

## 2022-09-06 NOTE — HISTORY OF PRESENT ILLNESS
[Post-hospitalization from ___ Hospital] : Post-hospitalization from [unfilled] Hospital [Admitted on: ___] : The patient was admitted on [unfilled] [Discharged on ___] : discharged on [unfilled] [Discharge Summary] : discharge summary [Pertinent Labs] : pertinent labs [Discharge Med List] : discharge medication list [Med Reconciliation] : medication reconciliation has been completed [Patient Contacted By: ____] : and contacted by [unfilled] [FreeTextEntry2] : Copied from [employer]\par '89yr old M w/a PMH of Dementia, HTN, HLD, prostate CA, RA, hard of hearing, RUE DVT, Cellulitis presents from [facility] after a fall noted to have tachycardia, increased wob with hypoxia, edema, elevated prbnp, + enterovirus admitted to [facility] for AHRF in setting of CHF exacerbation and Viral PNA. Pt was initiated on IV abx with benefit. Cultures NGTD. Pt was initiated on supplemental oxygen and weaned as tolerated, pt currently on 3LNC. Cardiology was consulted for CHF and AF w RVR. Medications were adjusted and pt has since improved. Pt is now medically stable for discharge with appropriate outpatient follow up.\par \par All electrolyte abnormalities were monitored carefully and repleted as necessary during this hospitalization. At the time of discharge patient was hemodynamically stable and amenable to all terms of discharge.'\par \par Patient was being seen at his residence ([facility]) for post-discharge follow-up under STAR PNA. Patient resting in a wheelchair with portable O2 on.. Per , Judit, patient has been at [facility] for last 4 years and has been moved to dementia section from assisted living side about a year ago and health has been significantly deteriorated. Patient looking very disheveled, responding to calling his name by lifting head, but no verbal response to any questions, Patient coughs persistently, + chest congestion noted. Due to his dementia and safety, Patient kept in the communal area during the day for close monitor by the staffs.

## 2022-09-06 NOTE — PHYSICAL EXAM
[No Acute Distress] : no acute distress [Well Nourished] : well nourished [Well Developed] : well developed [Ill-Appearing] : ill-appearing [Normal Sclera/Conjunctiva] : normal sclera/conjunctiva [PERRL] : pupils equal round and reactive to light [EOMI] : extraocular movements intact [Normal Outer Ear/Nose] : the outer ears and nose were normal in appearance [No JVD] : no jugular venous distention [Supple] : supple [No Respiratory Distress] : no respiratory distress  [No Accessory Muscle Use] : no accessory muscle use [Normal Rate] : normal rate  [Regular Rhythm] : with a regular rhythm [Normal S1, S2] : normal S1 and S2 [No Murmur] : no murmur heard [Pedal Pulses Present] : the pedal pulses are present [No Extremity Clubbing/Cyanosis] : no extremity clubbing/cyanosis [No Palpable Aorta] : no palpable aorta [Soft] : abdomen soft [Non Tender] : non-tender [Non-distended] : non-distended [No Masses] : no abdominal mass palpated [No HSM] : no HSM [Normal Bowel Sounds] : normal bowel sounds [Normal Posterior Cervical Nodes] : no posterior cervical lymphadenopathy [Normal Anterior Cervical Nodes] : no anterior cervical lymphadenopathy [No CVA Tenderness] : no CVA  tenderness [No Spinal Tenderness] : no spinal tenderness [No Joint Swelling] : no joint swelling [Grossly Normal Strength/Tone] : grossly normal strength/tone [No Rash] : no rash [No Focal Deficits] : no focal deficits [Normal Affect] : the affect was normal [de-identified] : diminished lung sounds throughout  [de-identified] : 2+ pitting edema to leah LE [de-identified] : wheelchair bound [de-identified] : demented, AOx1

## 2022-09-06 NOTE — ASSESSMENT
[FreeTextEntry1] : [] viral PNA/ chest congestion\par - START Duoneb TID, guaifenesin 600mg BID\par - START prednisone 40mg QD x 5 days, then taper 10mg every 5 days with GI PPX\par - START doxycycline 100mg BID x 7days\par - Patient has Pulm appt next week, but if son is not around paitnet is most likely not going to make it \par \par [] Dementia\par - advanced\par - continue donepezil \par - supportive care\par \par [] HTN\par - normotensive on exam\par - continue cardizem and Lopressor\par \par [] leah LE edema\par - elevate with foot rest\par - monitor for now\par \par [] Afib\par - rate controlled\par - continue cardizem, Lopressor\par \par [] h/o DVT\par - continue Xarelto\par \par \par Reviewed all medications at length with patient, All questions addressed. Worsening symptoms discussed with pt understanding. No issues or concerns at this time. Pt encouraged to call [facility] at [phone] w/any questions, concerns or issues. Will continue to follow up with patient status\par \par

## 2022-09-14 ENCOUNTER — APPOINTMENT (OUTPATIENT)
Dept: PULMONOLOGY | Facility: CLINIC | Age: 87
End: 2022-09-14

## 2022-10-28 NOTE — DISCHARGE NOTE PROVIDER - INSTRUCTIONS
Discharge Home Instuctions  Katelynn Cano  :1955    Your instructions:    Shower only for the first 5 days, NO TUB BATHS. Wash procedure site with mild soap, rinse and pat dry. Do not rub over the procedure site for two (2) days. Remove dressing and replace with a band-aid in 2 days. Site observations-Observe the area for bleeding or hematoma (lump under the skin caused by bleeding.)      A. Painless bruising is normal.  B. If a firmness/swelling seems to be increasing or there is bright red bleeding from site       (hold pressure over procedure site) and  CALL 911 IMMEDIATELY. What to do after you leave the hospital:    Recommended activity: no lifting, Driving, or Strenuous exercise for 3 days. DO NOT lift more than 10lbs. For your procedure you may have been given a sedative to help you relax. This drug will make you sleepy. It is usually given in a vein (by IV). Don't do anything for 24 hours that requires attention to detail. It takes time for the medicine effects to completely wear off. Do not sign any legally binding contracts or documents over the next 24 hours. For your safety, you should not drive or operate any machinery that could be dangerous until the medicine wears off and you can think clearly and react easily. Follow-up with physician in 7-10 days. Take your medication as ordered.       If you have any questions, you may call [phone] or your physicians office
Ensure Enlive three times a day.

## 2023-08-14 NOTE — ED ADULT NURSE NOTE - PRIMARY CARE PROVIDER
"NEW VISIT    Patient: George Damon  ?  YOB: 1962    MRN: [id number]  ?  Chief Complaint   Patient presents with    Right Knee - Initial Evaluation      ?  HPI: Patient is a 61-year-old male presents today for complaint of chronic right knee pain.  Patient was referred by his primary care provider [place].  He states this has been an ongoing issue for 4 to 5 years, denies any acute injury.  He states he was a  for many years up and down on his knees.  Pain is diffusely across the knee, medially, anteriorly and laterally.  Is a dull achy pain that is worse the longer he is standing or walking.  He has associated morning stiffness, and occasional swelling.  He also reports painful popping \"deep\" within the knee, and occasional catching episodes.  He previously has tried topical IcyHot without relief.  Notes recently was prescribed ibuprofen 800 mg which she takes 1 tab every other day with minimal relief.  He also occasionally ices and uses over-the-counter Tylenol.  No prior injections, bracing or physical therapy.      Allergies:   Allergies   Allergen Reactions    Penicillin G Swelling    Latex Itching       Past Medical History:   Diagnosis Date    Accidental fall     Acute respiratory failure with hypercapnia     CAD (coronary artery disease)     Congestive heart failure     COPD with acute exacerbation     Diabetes mellitus, type II     Diastolic dysfunction     Enlarged heart     Eye pain, left     GERD (gastroesophageal reflux disease)     (gastric reflex)    Headache     Hyperlipidemia     Hypertension     Knee pain, left     Lumbar back pain     With radiculopathy    Nicotine abuse     Dependence    Nicotine dependence 10/9/2015    Obesity     Onychauxis     Pain in joint of right knee     Pneumonia, bacterial     Rib pain on right side     Toe infection     Wrist pain, left      Past Surgical History:   Procedure Laterality Date    CARDIAC CATHETERIZATION  08/18/2015     Social " "History     Occupational History    Not on file   Tobacco Use    Smoking status: Former     Packs/day: 1.00     Years: 38.00     Pack years: 38.00     Types: Cigarettes     Quit date: 2022     Years since quittin.0    Smokeless tobacco: Never   Vaping Use    Vaping Use: Never used   Substance and Sexual Activity    Alcohol use: Never    Drug use: Never    Sexual activity: Defer      Social History     Social History Narrative    Not on file     Family History   Problem Relation Age of Onset    Diabetes Other     Cancer Other     Heart disease Father        Review of Systems  Constitutional: Negative.  Negative for fever.   Musculoskeletal: Positive for joint pain  Skin: Negative.  Negative for rash and wound.    Neurological: Negative for numbness.     Vitals:    23 0805   Pulse: 65   SpO2: (!) 89%   Weight: 118 kg (261 lb)   Height: 165.1 cm (65\")        Physical Exam  Constitutional: Patient is oriented to person, place, and time. Appears well-developed and well-nourished.   Head: Normocephalic and atraumatic.   Pulmonary/Chest: Effort normal.   Musculoskeletal:   See detailed exam below   Neurological: Alert and oriented to person, place, and time. No sensory deficit. Coordination normal.   Skin: Skin is warm and dry. Capillary refill takes less than 2 seconds. No rash noted. No erythema.     The right knee is without obvious signs of acute bony deformity, quadriceps atrophy, swelling, erythema, or ecchymosis. Mild joint effusion. The patella is without tenderness. Apprehension is negative with medial and lateral glide. Patella crepitus is positive. Patella grind is positive. The medial joint line is tender to palpation. The lateral joint line is tender to palpation. Soft tissue including the distal hamstring tendons, pes anserine, quad tendon, patellar tendon, proximal gastroc tendon, distal IT band, and Gerdy's tubercle are nontender. Flexion & extension are limited at end range motion and " painful. Knee strength is 4/5 secondary to pain. Varus & valgus stress, anterior drawer, Adria's, and posterior drawer are all negative. The opposite knee is nontender and stable. Gait is painful and tandem.      Diagnostics:  XR - 1 Results   I have personally reviewed Results for orders placed during the hospital encounter of 08/07/23    XR KNEE 1 OR 2 VW RIGHT 8/7/2023   and my interpretation of the image is as follows: No noted fracture or dislocation, mild joint effusion.  There is moderate degenerative change with subchondral sclerosis and joint space narrowing of the medial and patellofemoral compartment consistent with moderate osteoarthritic change.      Assessment:  Diagnoses and all orders for this visit:    1. Primary osteoarthritis of right knee (Primary)    2. Chronic pain of right knee    3. Morbid obesity with BMI of 40.0-44.9, adult    4. Diabetes mellitus type 2, insulin dependent        Plan    Intra-articular corticosteroid and viscosupplementation injection discussed.  Patient elected to proceed with intra-articular corticosteroid which was given as noted above without complication.  I discussed that the steroid injection can raise patient's blood sugar, as patient is a type II diabetic, and to closely monitor sugars/diet for the next 2-week and call PCP with any elevated sugar levels or symptomatic.  Physical Therapy discussed.  Patient declining at this time and wanting to proceed with low impact activities on his own for strengthening.  Activity modifications discussed and recommended.  Specifically low impact cardiovascular activities.  Use of neoprene sleeve brace, hinge knee brace discussed and recommended.  Fitted for knee Redi brace in office today.  Weight loss recommended  Rest, ice, compression, and elevation (RICE) therapy.  Encouraged regular ice 2-3 times daily.  Stretching and strengthening exercises  Alternate OTC Ibuprofen and Tylenol as needed.  Follow up in 4  month(s)    Date of encounter: 08/14/2023   Bill Huddleston DO    Electronically signed by Bill Huddleston DO, 08/14/23, 8:10 AM EDT.    Disclaimer: Please note that areas of this note were completed with computer voice recognition software.  Quite often unanticipated grammatical, syntax, homophones, and other interpretive errors are inadvertently transcribed by the computer software. Please excuse any errors that have escaped final proofreading.     .

## 2023-09-19 NOTE — ED ADULT NURSE NOTE - PRIMARY CARE PROVIDER
Patient informed of results. New order placed for 3 month A1C and mailed to patient. She voiced understanding. unknown

## 2024-11-08 NOTE — PHYSICAL THERAPY INITIAL EVALUATION ADULT - WEIGHT-BEARING RESTRICTIONS: SIT/STAND, REHAB EVAL
<-- Click to add NO significant Past Surgical History
attempted NWB Right UE, however pt non-compliant/weight-bearing as tolerated

## 2025-03-06 NOTE — ED ADULT NURSE NOTE - NS PRO PASSIVE SMOKE EXP
3/6/2025    Jorge Lopes (:  1995) mckenzie 29 y.o. male, here for evaluation of the following medical concerns:    HPI      Well Adult Physical   Patient here for a comprehensive physical exam.The patient reports problems -     Fatigue  Patient complains of fatigue. Symptoms began several years ago. The patient feels the fatigue began with: a significant change in weight and a witnessed or suspected sleep apnea. Symptoms of his fatigue have been anxiousness, change in appetite, fatigue with paradoxical insomnia, and lack of interest in usual activities. Patient describes the following psychological symptoms:  stress with multiple dental/maxillofacial surgeries. Had dental extraction 2 weeks ago, but pain is improving. Dentist planning on implant in 3-4 months.  . Patient denies change in hair texture, cold intolerance, constipation, exercise intolerance, fever, GI blood loss, symptoms of arthritis, and unusual rashes. Symptoms have gradually worsened. Symptom severity: moderate. Previous visits for this problem: yes, last seen 1 month ago by me. Patient has sleep medicine follow up in several weeks. He reports seeing therapist to help manage stress. Not yet ready to engage medication as he wants to focus on managing diet and exercise first.       Do you take any herbs or supplements that were not prescribed by a doctor? no Are you taking calcium supplements? no Are you taking aspirin daily?   Dental and eye exam UTD  Patient does not smoke (former),drinks seldomly, denies use of other drugs.     Agreeable to flu shot today. Denies hx of adverse reaction.     Review of Systems   Constitutional:  Positive for fatigue and unexpected weight change. Negative for chills and fever.   HENT:  Positive for dental problem. Negative for congestion, ear pain, facial swelling, sinus pain, sore throat and trouble swallowing.         Hx cleft lip   Eyes:  Negative for pain and visual disturbance.   Respiratory:  Negative for  No

## 2025-04-14 NOTE — ED ADULT NURSE NOTE - NSFALLRSKHRMRISKTYPE_ED_ALL_ED
Hospitalist Admission Note    NAME:   Juan Carlos Carlos   : 1945   MRN: [id number]     Date/Time: 2025 5:03 PM    Patient PCP: Tami, Pcp    ______________________________________________________________________  Given the patient's current clinical presentation, I have a high level of concern for decompensation if discharged from the emergency department.  Complex decision making was performed, which includes reviewing the patient's available past medical records, laboratory results, and x-ray films.       My assessment of this patient's clinical condition and my plan of care is as follows.    Assessment / Plan:  Juan Carlos Carlos is a 79 y.o.  male with PMHx significant for CVA with residual aphasia, recurrent UTIs, BPH, mood disorder who presents from home due to concern for tachycardia.     New onset atrial fibrillation with RVR  - A-fib with RVR on EKG, elevated proBNP, patient is asymptomatic.  [id number] is 5  - Received IV diltiazem bolus and drip in the ED, blood pressures presently low normal, will hold.  Start on p.o. metoprolol tartrate 25 mg twice daily, as needed IV Lopressor for HR greater than 120.  Started on Lovenox 1 mg/kg twice daily.  - Keep K above 4, mag above 2.  - Obtain echocardiogram, TSH reflex, telemetry monitoring  - Consult cardiology    Hypotension  -Possibly due to dehydration, family reports chronically low blood pressures, will give 500 cc saline bolus, placed on maintenance IV fluids, wean off diltiazem drip    History of CVA with residual aphasia  - Previously on aspirin, atorvastatin, discontinued due to recurrent gross hematuria.  Started on Lovenox as above, monitor clinically    History of recurrent UTIs  - Check urinalysis with reflex cultures    BPH  -Continue home Flomax    Mood disorder  - Continue home Cymbalta                                Medical Decision Making:   I personally reviewed labs: CBC, CMP, BNP  I personally reviewed imaging: Chest x-ray  I  age(85 years old or older)